# Patient Record
Sex: MALE | Race: WHITE | NOT HISPANIC OR LATINO | Employment: OTHER | URBAN - METROPOLITAN AREA
[De-identification: names, ages, dates, MRNs, and addresses within clinical notes are randomized per-mention and may not be internally consistent; named-entity substitution may affect disease eponyms.]

---

## 2017-01-23 RX ORDER — TAMSULOSIN HYDROCHLORIDE 0.4 MG/1
CAPSULE ORAL
Qty: 60 CAPSULE | Refills: 3 | Status: SHIPPED | OUTPATIENT
Start: 2017-01-23 | End: 2017-05-20 | Stop reason: SDUPTHER

## 2017-01-23 RX ORDER — FLUTICASONE PROPIONATE 50 MCG
SPRAY, SUSPENSION (ML) NASAL
Qty: 1 BOTTLE | Refills: 5 | Status: SHIPPED | OUTPATIENT
Start: 2017-01-23 | End: 2017-07-20 | Stop reason: SDUPTHER

## 2017-03-21 ENCOUNTER — OFFICE VISIT (OUTPATIENT)
Dept: INTERNAL MEDICINE | Facility: CLINIC | Age: 62
End: 2017-03-21

## 2017-03-21 VITALS
RESPIRATION RATE: 16 BRPM | DIASTOLIC BLOOD PRESSURE: 64 MMHG | SYSTOLIC BLOOD PRESSURE: 110 MMHG | WEIGHT: 207 LBS | BODY MASS INDEX: 28.87 KG/M2 | TEMPERATURE: 97.9 F | HEART RATE: 66 BPM

## 2017-03-21 DIAGNOSIS — L03.031 INFECTION OF NAIL BED OF TOE OF RIGHT FOOT: Primary | ICD-10-CM

## 2017-03-21 DIAGNOSIS — Z11.59 NEED FOR HEPATITIS C SCREENING TEST: ICD-10-CM

## 2017-03-21 LAB — HCV AB SER DONR QL: NORMAL

## 2017-03-21 PROCEDURE — 36415 COLL VENOUS BLD VENIPUNCTURE: CPT | Performed by: NURSE PRACTITIONER

## 2017-03-21 PROCEDURE — 86803 HEPATITIS C AB TEST: CPT | Performed by: NURSE PRACTITIONER

## 2017-03-21 PROCEDURE — 99213 OFFICE O/P EST LOW 20 MIN: CPT | Performed by: NURSE PRACTITIONER

## 2017-03-21 RX ORDER — ASPIRIN 81 MG
81 TABLET, DELAYED RELEASE (ENTERIC COATED) ORAL DAILY
Refills: 0 | COMMUNITY
Start: 2017-02-22 | End: 2017-03-21 | Stop reason: SDUPTHER

## 2017-03-21 RX ORDER — KETOCONAZOLE 20 MG/ML
SHAMPOO TOPICAL AS NEEDED
Refills: 0 | COMMUNITY
Start: 2017-02-22 | End: 2022-01-13

## 2017-03-21 RX ORDER — CEPHALEXIN 500 MG/1
500 CAPSULE ORAL 4 TIMES DAILY
Qty: 40 CAPSULE | Refills: 0 | Status: SHIPPED | OUTPATIENT
Start: 2017-03-21 | End: 2017-04-05

## 2017-03-21 NOTE — PROGRESS NOTES
"Chief Complaint   Patient presents with   • Ingrown Toenail     left great toe        Subjective     History of Present Illness the patient comes to clinic today with his wife reporting he has had a recent left great toenail infection this resolved he did cut a shape of a \"V\" in his mouth which he thought was helpful however nail infection has recurred the area is red warm and tender to touch .  The following portions of the patient's history were reviewed and updated as appropriate: allergies, current medications, past family history, past medical history, past social history, past surgical history and problem list.    Review of Systems   Skin:        L lateral great nail infection   All other systems reviewed and are negative.      Objective   Physical Exam   Constitutional: He is oriented to person, place, and time. He appears well-developed and well-nourished.   Cardiovascular: Normal rate and regular rhythm.    Pulmonary/Chest: Effort normal and breath sounds normal.   Neurological: He is alert and oriented to person, place, and time.   Skin: Skin is warm and dry.   Left lateral great nail.  Palpate mild erythema and edema minimal crusted discharge   Psychiatric: He has a normal mood and affect. His behavior is normal.   Nursing note and vitals reviewed.      Results for orders placed or performed in visit on 03/21/17   Hepatitis C Antibody   Result Value Ref Range    Hepatitis C Ab Non-Reactive Non-Reactive        Assessment/Plan   Jason was seen today for ingrown toenail.    Diagnoses and all orders for this visit:    Infection of nail bed of toe of right foot  -     cephalexin (KEFLEX) 500 MG capsule; Take 1 capsule by mouth 4 (Four) Times a Day.  -     Cancel: Tdap Vaccine Greater Than or Equal To 6yo IM  -     Ambulatory Referral to Podiatry    Need for hepatitis C screening test  -     Hepatitis C Antibody      H/M   Hep c today   tdap today    Follow up prn  RTC/call  If symptoms worsen  Meds MOA and " SE's reviewed and pt v/u

## 2017-03-21 NOTE — PATIENT INSTRUCTIONS
Paronychia  Paronychia is an infection of the skin that surrounds a nail. It usually affects the skin around a fingernail, but it may also occur near a toenail. It often causes pain and swelling around the nail. This condition may come on suddenly or develop over a longer period. In some cases, a collection of pus (abscess) can form near or under the nail. Usually, paronychia is not serious and it clears up with treatment.  CAUSES  This condition may be caused by bacteria or fungi. It is commonly caused by either Streptococcus or Staphylococcus bacteria. The bacteria or fungi often cause the infection by getting into the affected area through an opening in the skin, such as a cut or a hangnail.  RISK FACTORS  This condition is more likely to develop in:  · People who get their hands wet often, such as those who work as dishwashers, , or nurses.  · People who bite their fingernails or suck their thumbs.  · People who trim their nails too short.  · People who have hangnails or injured fingertips.  · People who get manicures.  · People who have diabetes.  SYMPTOMS  Symptoms of this condition include:  · Redness and swelling of the skin near the nail.  · Tenderness around the nail when you touch the area.  · Pus-filled bumps under the cuticle. The cuticle is the skin at the base or sides of the nail.  · Fluid or pus under the nail.  · Throbbing pain in the area.  DIAGNOSIS  This condition is usually diagnosed with a physical exam. In some cases, a sample of pus may be taken from an abscess to be tested in a lab. This can help to determine what type of bacteria or fungi is causing the condition.  TREATMENT  Treatment for this condition depends on the cause and severity of the condition. If the condition is mild, it may clear up on its own in a few days. Your health care provider may recommend soaking the affected area in warm water a few times a day. When treatment is needed, the options may  include:  · Antibiotic medicine, if the condition is caused by a bacterial infection.  · Antifungal medicine, if the condition is caused by a fungal infection.  · Incision and drainage, if an abscess is present. In this procedure, the health care provider will cut open the abscess so the pus can drain out.  HOME CARE INSTRUCTIONS  · Soak the affected area in warm water if directed to do so by your health care provider. You may be told to do this for 20 minutes, 2-3 times a day. Keep the area dry in between soakings.  · Take medicines only as directed by your health care provider.  · If you were prescribed an antibiotic medicine, finish all of it even if you start to feel better.  · Keep the affected area clean.  · Do not try to drain a fluid-filled bump yourself.  · If you will be washing dishes or performing other tasks that require your hands to get wet, wear rubber gloves. You should also wear gloves if your hands might come in contact with irritating substances, such as  or chemicals.  · Follow your health care provider's instructions about:    Wound care.    Bandage (dressing) changes and removal.  SEEK MEDICAL CARE IF:  · Your symptoms get worse or do not improve with treatment.  · You have a fever or chills.  · You have redness spreading from the affected area.  · You have continued or increased fluid, blood, or pus coming from the affected area.  · Your finger or knuckle becomes swollen or is difficult to move.     This information is not intended to replace advice given to you by your health care provider. Make sure you discuss any questions you have with your health care provider.     Document Released: 06/13/2002 Document Revised: 05/03/2016 Document Reviewed: 11/25/2015  [employer] Interactive Patient Education ©2016 [employer]  MyPlate from [employer]  The general, healthful diet is based on the 2010 Dietary Guidelines for Americans. The amount of food you need to eat from each food group depends on  your age, sex, and level of physical activity and can be individualized by a dietitian. Go to ChooseMyPlate.gov for more information.  WHAT DO I NEED TO KNOW ABOUT THE MYPLATE PLAN?  · Enjoy your food, but eat less.    · Avoid oversized portions.      ½ of your plate should include fruits and vegetables.    ¼ of your plate should be grains.    ¼ of your plate should be protein.  Grains  · Make at least half of your grains whole grains.  · For a 2,000 calorie daily food plan, eat 6 oz every day.  · 1 oz is about 1 slice bread, 1 cup cereal, or ½ cup cooked rice, cereal, or pasta.  Vegetables  · Make half your plate fruits and vegetables.  · For a 2,000 calorie daily food plan, eat 2½ cups every day.  · 1 cup is about 1 cup raw or cooked vegetables or vegetable juice or 2 cups raw leafy greens.  Fruits  · Make half your plate fruits and vegetables.  · For a 2,000 calorie daily food plan, eat 2 cups every day.  · 1 cup is about 1 cup fruit or 100% fruit juice or ½ cup dried fruit.  Protein  · For a 2,000 calorie daily food plan, eat 5½ oz every day.  · 1 oz is about 1 oz meat, poultry, or fish, ¼ cup cooked beans, 1 egg, 1 Tbsp peanut butter, or ½ oz nuts or seeds.  Dairy  · Switch to fat-free or low-fat (1%) milk.  · For a 2,000 calorie daily food plan, eat 3 cups every day.  · 1 cup is about 1 cup milk or yogurt or soy milk (soy beverage), 1½ oz natural cheese, or 2 oz processed cheese.  Fats, Oils, and Empty Calories  · Only small amounts of oils are recommended.  · Empty calories are calories from solid fats or added sugars.  · Compare sodium in foods like soup, bread, and frozen meals. Choose the foods with lower numbers.  · Drink water instead of sugary drinks.  WHAT FOODS CAN I EAT?  Grains  Whole grains such as whole wheat, quinoa, millet, and bulgur. Bread, rolls, and pasta made from whole grains. Brown or wild rice. Hot or cold cereals made from whole grains and without added sugar.  Vegetables  All fresh  vegetables, especially fresh red, dark green, or orange vegetables. Peas and beans. Low-sodium frozen or canned vegetables prepared without added salt. Low-sodium vegetable juices.  Fruits  All fresh, frozen, and dried fruits. Canned fruit packed in water or fruit juice without added sugar. Fruit juices without added sugar.  Meats and Other Protein Sources  Boiled, baked, or grilled lean meat trimmed of fat. Skinless poultry. Fresh seafood and shellfish. Canned seafood packed in water. Unsalted nuts and unsalted nut butters. Tofu. Dried beans and pea. Eggs.  Dairy  Low-fat or fat-free milk, yogurt, and cheeses.   Sweets and Desserts  Frozen desserts made from low-fat milk.  Fats and Oils  Olive, peanut, and canola oils and margarine. Salad dressing and mayonnaise made from these oils.  Other  Soups and casseroles made from allowed ingredients and without added fat or salt.  The items listed above may not be a complete list of recommended foods or beverages. Contact your dietitian for more options.   WHAT FOODS ARE NOT RECOMMENDED?  Grains  Sweetened, low-fiber cereals. Packaged baked goods. Snack crackers and chips. Cheese crackers, butter crackers, and biscuits. Frozen waffles, sweet breads, doughnuts, pastries, packaged baking mixes, pancakes, cakes, and cookies.  Vegetables  Regular canned or frozen vegetables or vegetables prepared with salt. Canned tomatoes. Canned tomato sauce. Fried vegetables. Vegetables in cream sauce or cheese sauce.  Fruits  Fruits packed in syrup or made with added sugar.   Meats and Other Protein Sources  Marbled or fatty meats such as ribs. Poultry with skin. Fried meats, poultry, eggs, or fish. Sausages, hot dogs, and deli meats such as pastrami, bologna, or salami.  Dairy  Whole milk, cream, cheeses made from whole milk, sour cream. Ice cream or yogurt made from whole milk or with added sugar.  Beverages  For adults, no more than one alcoholic drink per day. Regular soft drinks or  other sugary beverages. Juice drinks.  Sweets and Desserts  Sugary or fatty desserts, candy, and other sweets.  Fats and Oils  Solid shortening or partially hydrogenated oils. Solid margarine. Margarine that contains trans fats. Butter.  The items listed above may not be a complete list of foods and beverages to avoid. Contact your dietitian for more information.     This information is not intended to replace advice given to you by your health care provider. Make sure you discuss any questions you have with your health care provider.     Document Released: 01/06/2009 Document Revised: 01/08/2016 Document Reviewed: 11/26/2014  [employer] Interactive Patient Education ©2016 [employer]

## 2017-04-05 ENCOUNTER — OFFICE VISIT (OUTPATIENT)
Dept: INTERNAL MEDICINE | Facility: CLINIC | Age: 62
End: 2017-04-05

## 2017-04-05 VITALS
RESPIRATION RATE: 16 BRPM | DIASTOLIC BLOOD PRESSURE: 64 MMHG | SYSTOLIC BLOOD PRESSURE: 106 MMHG | BODY MASS INDEX: 28.73 KG/M2 | WEIGHT: 206 LBS | HEART RATE: 62 BPM

## 2017-04-05 DIAGNOSIS — M54.50 CHRONIC LOW BACK PAIN WITHOUT SCIATICA, UNSPECIFIED BACK PAIN LATERALITY: ICD-10-CM

## 2017-04-05 DIAGNOSIS — I50.22 CHRONIC SYSTOLIC CONGESTIVE HEART FAILURE (HCC): ICD-10-CM

## 2017-04-05 DIAGNOSIS — N40.0 BENIGN PROSTATIC HYPERPLASIA WITHOUT LOWER URINARY TRACT SYMPTOMS, UNSPECIFIED MORPHOLOGY: ICD-10-CM

## 2017-04-05 DIAGNOSIS — J30.89 SEASONAL ALLERGIC RHINITIS DUE TO OTHER ALLERGIC TRIGGER: ICD-10-CM

## 2017-04-05 DIAGNOSIS — G89.29 CHRONIC LOW BACK PAIN WITHOUT SCIATICA, UNSPECIFIED BACK PAIN LATERALITY: ICD-10-CM

## 2017-04-05 DIAGNOSIS — I48.19 PERSISTENT ATRIAL FIBRILLATION (HCC): Primary | ICD-10-CM

## 2017-04-05 DIAGNOSIS — I10 ESSENTIAL HYPERTENSION: ICD-10-CM

## 2017-04-05 PROCEDURE — 99214 OFFICE O/P EST MOD 30 MIN: CPT | Performed by: INTERNAL MEDICINE

## 2017-04-05 NOTE — PROGRESS NOTES
Subjective   Jason Lane is a 61 y.o. male.   Pt is here to follow up on A.fib,HTN,CHF,seasonal allergies and BPH.           History of Present Illness   Pt is here to follow up on A.fib,HTN,CHF,seasonal allergies and BPH.   He is scheduled to see Obey Muro, on 4/12/2017.  He states that all chronic issues are doing well.   He does have chronic back pain, as of late he states it has been bothering him more, especially when he is trying to work outside.   He would like to know if there is anything he can do for this.                 The following portions of the patient's history were reviewed and updated as appropriate: allergies, current medications, past family history, past medical history, past social history, past surgical history and problem list.             Review of Systems   Constitutional: Negative.    HENT: Negative.    Eyes: Negative.    Respiratory: Negative.    Cardiovascular:        See HPI.    Gastrointestinal: Negative.    Endocrine: Negative.    Genitourinary:        See HPI.    Musculoskeletal: Negative.    Skin: Negative.    Allergic/Immunologic:        See HPI.    Neurological: Negative.    Hematological: Negative.    Psychiatric/Behavioral: Negative.                 Objective   Physical Exam   Constitutional: He is oriented to person, place, and time. He appears well-developed and well-nourished.   HENT:   Head: Normocephalic and atraumatic.   Right Ear: External ear normal.   Left Ear: External ear normal.   Nose: Nose normal.   Mouth/Throat: Oropharynx is clear and moist.   Eyes: Conjunctivae and EOM are normal. Pupils are equal, round, and reactive to light.   Neck: Normal range of motion. Neck supple. No tracheal deviation present. No thyromegaly present.   Cardiovascular: Normal rate, regular rhythm, normal heart sounds and intact distal pulses.    Pulmonary/Chest: Effort normal and breath sounds normal.   Abdominal: Soft. Bowel sounds are normal. He exhibits no  distension. There is no tenderness.   Neurological: He is alert and oriented to person, place, and time. He has normal reflexes.   Skin: Skin is warm and dry.   Psychiatric: He has a normal mood and affect.   Nursing note and vitals reviewed.              Assessment/Plan    Persistent atrial fibrillation    Essential hypertension  -     Comprehensive Metabolic Panel; Future  -     Lipid Panel; Future  -     CBC (No Diff); Future    Chronic systolic congestive heart failure    Seasonal allergic rhinitis due to other allergic trigger    Benign prostatic hyperplasia without lower urinary tract symptoms, unspecified morphology  -     PSA Screen; Future    Chronic low back pain without sciatica, unspecified back pain laterality  -     Ambulatory Referral to Physical Therapy Evaluate and treat        1.) Tetanus vaccine, Pt will check with pharmacy not covered here.   2.) PSA due in July 2017, Pt informed, I have placed lab order.   3.) Check CBC,CMP and lipid panel with PSA in July 2017.   4.) Physical therapy for low back pain in hopes to strengthen core.

## 2017-04-10 ENCOUNTER — HOSPITAL ENCOUNTER (OUTPATIENT)
Dept: PHYSICAL THERAPY | Facility: HOSPITAL | Age: 62
Setting detail: THERAPIES SERIES
Discharge: HOME | End: 2017-04-10
Attending: INTERNAL MEDICINE

## 2017-04-10 DIAGNOSIS — M54.50 CHRONIC LOW BACK PAIN WITHOUT SCIATICA, UNSPECIFIED BACK PAIN LATERALITY: Primary | ICD-10-CM

## 2017-04-10 DIAGNOSIS — G89.29 CHRONIC LOW BACK PAIN WITHOUT SCIATICA, UNSPECIFIED BACK PAIN LATERALITY: Primary | ICD-10-CM

## 2017-04-10 PROCEDURE — 97161 PT EVAL LOW COMPLEX 20 MIN: CPT

## 2017-04-10 NOTE — PROGRESS NOTES
Outpatient Physical Therapy Ortho Initial Evaluation  [facility]     Patient Name: Jason Lane  : 1955  MRN: [id number]  Today's Date: 4/10/2017      Visit Date: 04/10/2017    Patient Active Problem List   Diagnosis   • Anemia   • Atrial fibrillation   • Benign prostatic hyperplasia   • Constipation   • Reduced libido   • Hypertension   • Shoulder pain   • Seasonal allergic rhinitis   • Screening PSA (prostate specific antigen)   • Eczema   • Alcohol abuse   • Tobacco abuse   • Acute systolic congestive heart failure   • Syncope   • PAF (paroxysmal atrial fibrillation)   • Congestive heart failure   • Skin lesions   • Chronic low back pain without sciatica        Past Medical History:   Diagnosis Date   • Acute systolic congestive heart failure     Acute systolic congestive heart failure secondary to atrial fibrillation with rapid ventricular response.   • Alcohol abuse    • Atrial fibrillation    • Congestive heart failure     History of congestive heart failure with normalized ejection fraction of 50% to 55%, 2014. Continue lisinopril and beta blocker in the form of sotalol.  He has current class I symptoms   • Hearing loss    • Hypertension    • PAF (paroxysmal atrial fibrillation)    • Syncope     Left and right heart catheterization, Dr. Larson, 03/15/2013, with normal pressures and normal coronary arteries: EF 55%.   • Tobacco abuse         No past surgical history on file.    Visit Dx:     ICD-10-CM ICD-9-CM   1. Chronic low back pain without sciatica, unspecified back pain laterality M54.5 724.2    G89.29 338.29             Patient History       04/10/17 0800          History    Chief Complaint Difficulty Walking;Muscle weakness;Pain;Fatigue/poor endurance  -      Type of Pain Back pain  -DR      Date Current Problem(s) Began 01/01/15  -      Brief Description of Current Complaint Mr. Lane presents to clinic with chronic low back pain and denies numbness/tingling in BLE. He  reports he fell a ladder at work 2 years which resulted in x 2 fractures in R pelvis and fracture R wrist. He has had resultant low back pain since then central/R. He reports he is unable to perform activities for extended periods of time such as walking, standing and yard work. He enjoys performing yardwork, but has been limited in holding the weed eater.   -DR      Onset Date- PT 4/10/17  -DR      Patient/Caregiver Goals Relieve pain;Improve mobility;Improve strength;Know what to do to help the symptoms;Other (comment)   be able to increase walking distance/perform yardwork  -DR      Current Tobacco Use no  -DR      Hand Dominance right-handed  -DR      Occupation/sports/leisure activities Retired-yard work, play with grandchildren  -DR      Patient seeing anyone else for problem(s)? Yes  -DR      Pain     Pain Location Back  -DR      Pain at Present 4  -DR      Pain at Best 4  -DR      Pain at Worst 8  -DR      Pain Frequency Constant/continuous  -DR      Pain Description Aching  -DR      What Performance Factors Make the Current Problem(s) WORSE? lifting, yard work, walking, sitting- anything for extended period of time  -DR      What Performance Factors Make the Current Problem(s) BETTER? Advil-helps  -DR      Tolerance Time- Standing 20 minutes  -DR      Tolerance Time- Sitting 20-30 minutes  -DR      Tolerance Time- Walking 30 minutes  -DR      Is your sleep disturbed? Yes  -DR      Total hours of sleep per night 6 hours  -DR      Fall Risk Assessment    Any falls in the past year: No  -DR      Daily Activities    Primary Language English  -DR      Patient is concerned about/has problems with Climbing Stairs;Bed Mobility;Walking  -DR      Pt Participated in POC and Goals Yes  -DR      Safety    Are you being hurt, hit, or frightened by anyone at home or in your life? No  -DR      Are you being neglected by a caregiver No  -DR        User Key  (r) = Recorded By, (t) = Taken By, (c) = Cosigned By    Initials  Name Provider Type    DR Anthony Bergeron, PT Physical Therapist                PT Ortho       04/10/17 0800    Subjective Pain    Able to rate subjective pain? yes  -DR    Pre-Treatment Pain Level 4  -DR    Post-Treatment Pain Level 4  -DR    Posture/Observations    Alignment Options Forward head;Rounded shoulders;Lumbar lordosis;Iliac crests   heel lift LLE-reports podiatrist stated he has LLD  -DR    Forward Head Bilateral:;Mild  -DR    Rounded Shoulders Bilateral:;Moderate  -DR    Lumbar lordosis Bilateral:;Decreased  -DR    Iliac crests --   level  -DR    Posture/Observations Comments Pt demonstrates leg length discrepancy; measured ASIS to inferior medial malleolus: R 37 inches; L 36.5 inches. Pt wears insert in shoe.  -DR    Sensation    Sensation WNL? WNL  -DR    Light Touch No apparent deficits  -DR    Quarter Clearing    Quarter Clearing Lower Quarter Clearing  -DR    DTR- Lower Quarter Clearing    Patellar tendon (L2-4) Bilateral:;2- Normal response  -DR    Achilles tendon (S1-2) Bilateral:;2- Normal response  -DR    Neural Tension Signs- Lower Quarter Clearing    Slump Bilateral:;Negative  -DR    Pathological Reflexes- Lower Quarter Clearing    Babinski Bilateral:;Negative  -DR    Sensory Screen for Light Touch- Lower Quarter Clearing    L2 (anterior mid thigh) Bilateral:;Intact  -DR    L3 (distal anterior thigh) Bilateral:;Intact  -DR    L4 (medial lower leg/foot) Bilateral:;Intact  -DR    L5 (lateral lower leg/great toe) Bilateral:;Intact  -DR    S1 (bottom of foot) Bilateral:;Intact  -DR    Myotomal Screen- Lower Quarter Clearing    Hip flexion (L2) Bilateral:;4+ (Good +)  -DR    Knee extension (L3) Bilateral:;4+ (Good +)  -DR    Ankle DF (L4) Bilateral:;5 (Normal)  -DR    Great toe extension (L5) Bilateral:;4+ (Good +)  -DR    Ankle PF (S1) Bilateral:;4+ (Good +)   ankle eversion  -DR    Knee flexion (S2) Bilateral:;4+ (Good +)  -DR    Lumbar ROM Screen- Lower Quarter Clearing    Lumbar Flexion  Impaired   75% motion-hands at mid shin with pain at end range  -DR    Lumbar Extension Impaired   25% motion; decreased segmental motion  -DR    Lumbar Lateral Flexion Impaired   L/R both 50% motion; reports increased pain on R  -DR    Lumbar Rotation Normal  -DR    SI/Hip Screen- Lower Quarter Clearing    ASIS compression Bilateral:;Negative  -DR    ASIS distraction Bilateral:;Negative  -    Jamie's/Stuart's test Bilateral:   moderately tight  -DR    Special Tests/Palpation    Special Tests/Palpation Lumbar/SI  -DR    Lumbosacral Palpation    SI Right:;Tender  -    Spinous Process Bilateral:;Tender   L5  -DR    Lumbosacral Palpation? Yes  -DR    Lumbosacral Accessory Motions    Lumbosacral Accessory Motions Tested? Yes  -    PA [place]- L1 Center:;Hypomobile  -    PA [place]- L2 Center:;Hypomobile  -DR    PA [place]- L3 Center:;Hypomobile  -DR    PA [place]- L4 Center:;Hypomobile  -DR    PA [place]- L5 WNL;Hypomobile  -DR    PA [place]- Sacral base Center:;Hypomobile  -DR    MMT (Manual Muscle Testing)    General MMT Assessment lower extremity strength deficits identified  -DR    Left Hip    Hip Extension Gross Movement (4/5) good   tested in sidelying  -DR    Hip ABduction Gross Movement (4+/5) good plus  -DR    Hip ADduction Gross Movement (4+/5) good plus  -DR    Right Hip    Hip Extension Gross Movement (4/5) good   tested in sidelying  -DR    Hip ABduction Gross Movement (4/5) good  -DR    Hip ADduction Gross Movement (4/5) good  -DR    Flexibility    Flexibility Tested? Lower Extremity  -DR    Lower Extremity Flexibility    Hamstrings Bilateral:;Moderately limited   L: 45 degrees; R: 50 degrees  -    Pathomechanics    Spine Pathomechanics Limited lumbar flattening with forward bend  -DR    Transfers    Transfer, Comment Independent  -DR    Gait Assessment/Treatment    Gait, Comment Pt presents to clinic with insert L shoe for leg length discrepancy. With shoes on no apparent deficits observed. Without shoes  on pt demonstrated decreased step length bilaterally and R circumduction during swing phase.  -      User Key  (r) = Recorded By, (t) = Taken By, (c) = Cosigned By    Initials Name Provider Type    DR Anthony Bergeron, PT Physical Therapist                                PT OP Goals       04/10/17 0859 04/10/17 0800    PT Short Term Goals    STG Date to Achieve  04/24/17  -    STG 1  Patient is independent with HEP for flexibility and initial strengthening  -    STG 1 Progress  New  -    STG 2  Pt will report low back pain is reduced to < 2/10 at all times.  -    STG 2 Progress  New  -    Long Term Goals    LTG Date to Achieve  05/08/17  -    LTG 1  Patient is independent with long term HEP for stabilization.  -    LTG 1 Progress  New  -    LTG 2  Modified Oswestry score is reduced by at least 10%.  -    LTG 2 Progress  New  -    LTG 3  Pt will report low back pain is reduced to < 1/10 at all times.  -    LTG 3 Progress  New  -    LTG 4  Pt will demonstrate BLE strength gain >/= 1/2 grade to improve ability to perform ADLs.  -    LTG 4 Progress  New  -    Time Calculation    PT Goal Re-Cert Due Date 05/08/17  -DR 05/08/17  -      User Key  (r) = Recorded By, (t) = Taken By, (c) = Cosigned By    Initials Name Provider Type    DR Anthony Bergeron, PT Physical Therapist                PT Assessment/Plan       04/10/17 0800       PT Assessment    Functional Limitations Impaired gait;Limitation in home management;Performance in leisure activities;Performance in self-care ADL;Limitations in community activities  -DR     Impairments Endurance;Gait;Impaired flexibility;Impaired muscle endurance;Muscle strength;Pain;Poor body mechanics;Posture;Range of motion  -DR     Assessment Comments Pt presents to clinic with complaints of chronic low back pain. He demonstrated decreased lumbar AROM, decreased bilateral hamstring flexibility, decreased BLE strength R > L and tenderness to palpation R SI and  L5. He responded well to single knee to chest bilateral LE. He would benefit from skilled PT intervention to address functional deficits and decrease pain.  -DR     Please refer to paper survey for additional self-reported information Yes  -DR     Rehab Potential Good  -DR     Patient/caregiver participated in establishment of treatment plan and goals Yes  -DR     Patient would benefit from skilled therapy intervention Yes  -DR     PT Plan    PT Frequency 2x/week  -DR     Predicted Duration of Therapy Intervention (days/wks) x 4 weeks  -DR     Planned CPT's? PT EVAL LOW COMPLEXITY: 46961;PT THER PROC EA 15 MIN: 45574;PT THER ACT EA 15 MIN: 22538;PT MANUAL THERAPY EA 15 MIN: 06666;PT NEUROMUSC RE-EDUCATION EA 15 MIN: 24778;PT GAIT TRAINING EA 15 MIN: 66159;PT ELECTRICAL STIM ATTD EA 15 MIN: 66790;PT ULTRASOUND EA 15 MIN: 07243;PT HOT/COLD PACK WC NONMCARE: 53341  -DR     PT Plan Comments Pt will be seen x2/wk for 4 weeks with treatment to include stretching, strengthening, neuromuscular re-education, manual therapy and modalities as needed.  -       User Key  (r) = Recorded By, (t) = Taken By, (c) = Cosigned By    Initials Name Provider Type    DR Anthony Bergeron, PT Physical Therapist                  Exercises       04/10/17 0800          Subjective Pain    Able to rate subjective pain? yes  -DR      Pre-Treatment Pain Level 4  -DR      Post-Treatment Pain Level 4  -DR      Exercise 1    Exercise Name 1 Transversus Abdominis Isometric  -DR      Reps 1 10  -DR      Exercise 2    Exercise Name 2 LTR-added to HEP  -DR      Reps 2 10  -DR      Exercise 3    Exercise Name 3 SKTC-added to HEP  -DR      Reps 3 10  -DR        User Key  (r) = Recorded By, (t) = Taken By, (c) = Cosigned By    Initials Name Provider Type    DR Anthony Bergeron, PT Physical Therapist                              Outcome Measures       04/10/17 0800          Modified Oswestry    Modified Oswestry Score/Comments 36%  -      Functional  Assessment    Outcome Measure Options Thomas BRIONES        User Key  (r) = Recorded By, (t) = Taken By, (c) = Cosigned By    Initials Name Provider Type    DR Anthony Bergeron, PT Physical Therapist            Time Calculation:   Start Time: 0800     Therapy Charges for Today     Code Description Service Date Service Provider Modifiers Qty    [id number]  PT EVAL LOW COMPLEXITY 4 4/10/2017 Anthony Bergeron, PT GP 1          PT G-Codes  Outcome Measure Options: Thomas Bergeron, PT  4/10/2017

## 2017-04-10 NOTE — PROGRESS NOTES
Outpatient Physical Therapy Ortho Initial Evaluation  [facility]     Patient Name: Jason Lane  : 1955  MRN: [id number]  Today's Date: 4/10/2017      Visit Date: 04/10/2017    Patient Active Problem List   Diagnosis   • Anemia   • Atrial fibrillation   • Benign prostatic hyperplasia   • Constipation   • Reduced libido   • Hypertension   • Shoulder pain   • Seasonal allergic rhinitis   • Screening PSA (prostate specific antigen)   • Eczema   • Alcohol abuse   • Tobacco abuse   • Acute systolic congestive heart failure   • Syncope   • PAF (paroxysmal atrial fibrillation)   • Congestive heart failure   • Skin lesions   • Chronic low back pain without sciatica        Past Medical History:   Diagnosis Date   • Acute systolic congestive heart failure     Acute systolic congestive heart failure secondary to atrial fibrillation with rapid ventricular response.   • Alcohol abuse    • Atrial fibrillation    • Congestive heart failure     History of congestive heart failure with normalized ejection fraction of 50% to 55%, 2014. Continue lisinopril and beta blocker in the form of sotalol.  He has current class I symptoms   • Hearing loss    • Hypertension    • PAF (paroxysmal atrial fibrillation)    • Syncope     Left and right heart catheterization, Dr. Larson, 03/15/2013, with normal pressures and normal coronary arteries: EF 55%.   • Tobacco abuse         No past surgical history on file.    Visit Dx:     ICD-10-CM ICD-9-CM   1. Chronic low back pain without sciatica, unspecified back pain laterality M54.5 724.2    G89.29 338.29             Patient History       04/10/17 0800          History    Chief Complaint Difficulty Walking;Muscle weakness;Pain;Fatigue/poor endurance  -      Type of Pain Back pain  -DR      Date Current Problem(s) Began 01/01/15  -      Brief Description of Current Complaint Mr. Lane presents to clinic with chronic low back pain and denies numbness/tingling in BLE. He  reports he fell a ladder at work 2 years which resulted in x 2 fractures in R pelvis and fracture R wrist. He has had resultant low back pain since then central/R. He reports he is unable to perform activities for extended periods of time such as walking, standing and yard work. He enjoys performing yardwork, but has been limited in holding the weed eater.   -DR      Onset Date- PT 4/10/17  -DR      Patient/Caregiver Goals Relieve pain;Improve mobility;Improve strength;Know what to do to help the symptoms;Other (comment)   be able to increase walking distance/perform yardwork  -DR      Current Tobacco Use no  -DR      Hand Dominance right-handed  -DR      Occupation/sports/leisure activities Retired-yard work, play with grandchildren  -DR      Patient seeing anyone else for problem(s)? Yes  -DR      Pain     Pain Location Back  -DR      Pain at Present 4  -DR      Pain at Best 4  -DR      Pain at Worst 8  -DR      Pain Frequency Constant/continuous  -DR      Pain Description Aching  -DR      What Performance Factors Make the Current Problem(s) WORSE? lifting, yard work, walking, sitting- anything for extended period of time  -DR      What Performance Factors Make the Current Problem(s) BETTER? Advil-helps  -DR      Tolerance Time- Standing 20 minutes  -DR      Tolerance Time- Sitting 20-30 minutes  -DR      Tolerance Time- Walking 30 minutes  -DR      Is your sleep disturbed? Yes  -DR      Total hours of sleep per night 6 hours  -DR      Fall Risk Assessment    Any falls in the past year: No  -DR      Daily Activities    Primary Language English  -DR      Patient is concerned about/has problems with Climbing Stairs;Bed Mobility;Walking  -DR      Pt Participated in POC and Goals Yes  -DR      Safety    Are you being hurt, hit, or frightened by anyone at home or in your life? No  -DR      Are you being neglected by a caregiver No  -DR        User Key  (r) = Recorded By, (t) = Taken By, (c) = Cosigned By    Initials  Name Provider Type    DR Anthony Bergeron, PT Physical Therapist                PT Ortho       04/10/17 0800    Subjective Pain    Able to rate subjective pain? yes  -DR    Pre-Treatment Pain Level 4  -DR    Post-Treatment Pain Level 4  -DR    Posture/Observations    Alignment Options Forward head;Rounded shoulders;Lumbar lordosis;Iliac crests   heel lift LLE-reports podiatrist stated he has LLD  -DR    Forward Head Bilateral:;Mild  -DR    Rounded Shoulders Bilateral:;Moderate  -DR    Lumbar lordosis Bilateral:;Decreased  -DR    Iliac crests --   level  -DR    Posture/Observations Comments Pt demonstrates leg length discrepancy; measured ASIS to inferior medial malleolus: R 37 inches; L 36.5 inches. Pt wears insert in shoe.  -DR    Sensation    Sensation WNL? WNL  -DR    Light Touch No apparent deficits  -DR    Quarter Clearing    Quarter Clearing Lower Quarter Clearing  -DR    DTR- Lower Quarter Clearing    Patellar tendon (L2-4) Bilateral:;2- Normal response  -DR    Achilles tendon (S1-2) Bilateral:;2- Normal response  -DR    Neural Tension Signs- Lower Quarter Clearing    Slump Bilateral:;Negative  -DR    Pathological Reflexes- Lower Quarter Clearing    Babinski Bilateral:;Negative  -DR    Sensory Screen for Light Touch- Lower Quarter Clearing    L2 (anterior mid thigh) Bilateral:;Intact  -DR    L3 (distal anterior thigh) Bilateral:;Intact  -DR    L4 (medial lower leg/foot) Bilateral:;Intact  -DR    L5 (lateral lower leg/great toe) Bilateral:;Intact  -DR    S1 (bottom of foot) Bilateral:;Intact  -DR    Myotomal Screen- Lower Quarter Clearing    Hip flexion (L2) Bilateral:;4+ (Good +)  -DR    Knee extension (L3) Bilateral:;4+ (Good +)  -DR    Ankle DF (L4) Bilateral:;5 (Normal)  -DR    Great toe extension (L5) Bilateral:;4+ (Good +)  -DR    Ankle PF (S1) Bilateral:;4+ (Good +)   ankle eversion  -DR    Knee flexion (S2) Bilateral:;4+ (Good +)  -DR    Lumbar ROM Screen- Lower Quarter Clearing    Lumbar Flexion  Impaired   75% motion-hands at mid shin with pain at end range  -DR    Lumbar Extension Impaired   25% motion; decreased segmental motion  -DR    Lumbar Lateral Flexion Impaired   L/R both 50% motion; reports increased pain on R  -DR    Lumbar Rotation Normal  -DR    SI/Hip Screen- Lower Quarter Clearing    ASIS compression Bilateral:;Negative  -DR    ASIS distraction Bilateral:;Negative  -    Jamie's/Stuart's test Bilateral:   moderately tight  -DR    Special Tests/Palpation    Special Tests/Palpation Lumbar/SI  -DR    Lumbosacral Palpation    SI Right:;Tender  -    Spinous Process Bilateral:;Tender   L5  -DR    Lumbosacral Palpation? Yes  -DR    Lumbosacral Accessory Motions    Lumbosacral Accessory Motions Tested? Yes  -    PA [place]- L1 Center:;Hypomobile  -    PA [place]- L2 Center:;Hypomobile  -DR    PA [place]- L3 Center:;Hypomobile  -DR    PA [place]- L4 Center:;Hypomobile  -DR    PA [place]- L5 WNL;Hypomobile  -DR    PA glide- Sacral base Center:;Hypomobile  -DR    MMT (Manual Muscle Testing)    General MMT Assessment lower extremity strength deficits identified  -DR    Left Hip    Hip Extension Gross Movement (4/5) good   tested in sidelying  -DR    Hip ABduction Gross Movement (4+/5) good plus  -DR    Hip ADduction Gross Movement (4+/5) good plus  -DR    Right Hip    Hip Extension Gross Movement (4/5) good   tested in sidelying  -DR    Hip ABduction Gross Movement (4/5) good  -DR    Hip ADduction Gross Movement (4/5) good  -DR    Flexibility    Flexibility Tested? Lower Extremity  -DR    Lower Extremity Flexibility    Hamstrings Bilateral:;Moderately limited   L: 45 degrees; R: 50 degrees  -    Pathomechanics    Spine Pathomechanics Limited lumbar flattening with forward bend  -DR    Transfers    Transfer, Comment Independent  -DR    Gait Assessment/Treatment    Gait, Comment Pt presents to clinic with insert L shoe for leg length discrepancy. With shoes on no apparent deficits observed. Without shoes  on pt demonstrated decreased step length bilaterally and R circumduction during swing phase.  -      User Key  (r) = Recorded By, (t) = Taken By, (c) = Cosigned By    Initials Name Provider Type    DR Anthony Bergeron, PT Physical Therapist                            Therapy Education       04/10/17 0936          Therapy Education    Given HEP  -DR      Program New  ANSELMO      How Provided Verbal;Demonstration;Written  -      Provided to Patient  -DR      Level of Understanding Verbalized;Demonstrated  -        User Key  (r) = Recorded By, (t) = Taken By, (c) = Cosigned By    Initials Name Provider Type    DR Anthony Bergeron, PT Physical Therapist                PT OP Goals       04/10/17 0859 04/10/17 0800    PT Short Term Goals    STG Date to Achieve  04/24/17  -    STG 1  Patient is independent with HEP for flexibility and initial strengthening  -    STG 1 Progress  New  -    STG 2  Pt will report low back pain is reduced to < 2/10 at all times.  -    STG 2 Progress  New  ANSELMO    Long Term Goals    LTG Date to Achieve  05/08/17  -    LTG 1  Patient is independent with long term HEP for stabilization.  -    LTG 1 Progress  New  -    LTG 2  Modified Oswestry score is reduced by at least 10%.  -    LTG 2 Progress  New  -    LTG 3  Pt will report low back pain is reduced to < 1/10 at all times.  -    LTG 3 Progress  New  -    LTG 4  Pt will demonstrate BLE strength gain >/= 1/2 grade to improve ability to perform ADLs.  -DR HOOKSG 4 Progress  New  -DR    Time Calculation    PT Goal Re-Cert Due Date 05/08/17  -DR 05/08/17  -      User Key  (r) = Recorded By, (t) = Taken By, (c) = Cosigned By    Initials Name Provider Type    DR Anthony Bergeron, PT Physical Therapist                PT Assessment/Plan       04/10/17 0800       PT Assessment    Functional Limitations Impaired gait;Limitation in home management;Performance in leisure activities;Performance in self-care ADL;Limitations in community  activities  -DR     Impairments Endurance;Gait;Impaired flexibility;Impaired muscle endurance;Muscle strength;Pain;Poor body mechanics;Posture;Range of motion  -DR     Assessment Comments Pt presents to clinic with complaints of chronic low back pain. He demonstrated decreased lumbar AROM, decreased bilateral hamstring flexibility, decreased BLE strength R > L and tenderness to palpation R SI and L5. He responded well to single knee to chest bilateral LE. He would benefit from skilled PT intervention to address functional deficits and decrease pain.  -DR     Please refer to paper survey for additional self-reported information Yes  -DR     Rehab Potential Good  -DR     Patient/caregiver participated in establishment of treatment plan and goals Yes  -DR     Patient would benefit from skilled therapy intervention Yes  -DR     PT Plan    PT Frequency 2x/week  -DR     Predicted Duration of Therapy Intervention (days/wks) x 4 weeks  -DR     Planned CPT's? PT EVAL LOW COMPLEXITY: 25787;PT THER PROC EA 15 MIN: 38111;PT THER ACT EA 15 MIN: 24243;PT MANUAL THERAPY EA 15 MIN: 12130;PT NEUROMUSC RE-EDUCATION EA 15 MIN: 40613;PT GAIT TRAINING EA 15 MIN: 10473;PT ELECTRICAL STIM ATTD EA 15 MIN: 52020;PT ULTRASOUND EA 15 MIN: 80942;PT HOT/COLD PACK WC NONMCARE: 10332  -DR     PT Plan Comments Pt will be seen x2/wk for 4 weeks with treatment to include stretching, strengthening, neuromuscular re-education, manual therapy and modalities as needed.  -       User Key  (r) = Recorded By, (t) = Taken By, (c) = Cosigned By    Initials Name Provider Type    DR Anthony Bergeron, PT Physical Therapist                  Exercises       04/10/17 0800          Subjective Pain    Able to rate subjective pain? yes  -DR      Pre-Treatment Pain Level 4  -DR      Post-Treatment Pain Level 4  -DR      Exercise 1    Exercise Name 1 Transversus Abdominis Isometric  -DR      Reps 1 10  -DR      Exercise 2    Exercise Name 2 LTR-added to HEP  -       Reps 2 10  -DR      Exercise 3    Exercise Name 3 SKTC-added to HEP  -DR      Reps 3 10  -DR        User Key  (r) = Recorded By, (t) = Taken By, (c) = Cosigned By    Initials Name Provider Type    DR Anthony Bergeron, PT Physical Therapist                              Outcome Measures       04/10/17 0800          Modified Oswestry    Modified Oswestry Score/Comments 36%  -DR      Functional Assessment    Outcome Measure Options Modifed Rosa  -        User Key  (r) = Recorded By, (t) = Taken By, (c) = Cosigned By    Initials Name Provider Type    DR Anthony Bergeron, PT Physical Therapist            Time Calculation:   Start Time: 0800     Therapy Charges for Today     Code Description Service Date Service Provider Modifiers Qty    55929075437 HC PT EVAL LOW COMPLEXITY 4 4/10/2017 Anthony Bergeron, PT GP 1          PT G-Codes  Outcome Measure Options: Thomas Bergeron, PT  4/10/2017

## 2017-04-12 ENCOUNTER — OFFICE VISIT (OUTPATIENT)
Dept: CARDIOLOGY | Facility: CLINIC | Age: 62
End: 2017-04-12

## 2017-04-12 VITALS
WEIGHT: 203.2 LBS | HEART RATE: 58 BPM | SYSTOLIC BLOOD PRESSURE: 112 MMHG | DIASTOLIC BLOOD PRESSURE: 72 MMHG | HEIGHT: 71 IN | BODY MASS INDEX: 28.45 KG/M2

## 2017-04-12 DIAGNOSIS — I10 ESSENTIAL HYPERTENSION: ICD-10-CM

## 2017-04-12 DIAGNOSIS — I42.9 CARDIOMYOPATHY (HCC): ICD-10-CM

## 2017-04-12 DIAGNOSIS — I48.0 PAROXYSMAL ATRIAL FIBRILLATION (HCC): Primary | ICD-10-CM

## 2017-04-12 PROCEDURE — 99213 OFFICE O/P EST LOW 20 MIN: CPT | Performed by: INTERNAL MEDICINE

## 2017-04-12 PROCEDURE — 93000 ELECTROCARDIOGRAM COMPLETE: CPT | Performed by: INTERNAL MEDICINE

## 2017-04-12 NOTE — PROGRESS NOTES
Jason Lane  1955  [phone]  [phone]    04/12/2017    [facility] CARDIOLOGY     Susanna Mancilla, DO  [address]  [facility] [address]    Chief Complaint   Patient presents with   • Atrial Fibrillation       Problem List:   PROBLEM LIST:   1. Atrial fibrillation:  a. Rapid ventricular response of unknown duration, hospitalized on 01/01/2012.   b. Pradaxa and sotalol initiated, 01/02/2012.   c. Echocardiogram, January 2012 with mild mitral regurgitation, ejection fraction 35% to 40%.   d. Echocardiogram, May 2012: Left ventricular ejection fraction of 45% to 50%, mild TR.   e. Echocardiogram, 12/15/2014: EF 50% to 55%. Mild-to-moderate TR.   2. Alcohol abuse.   3. Tobacco abuse.   4. Hypertension.   5. Acute systolic congestive heart failure secondary to atrial fibrillation with rapid ventricular response.   6. Syncope:  a. Left and right heart catheterization, Dr. Larson, 03/15/2013, with normal pressures and normal coronary arteries: EF 55%  Allergies  No Known Allergies    Current Medications    Current Outpatient Prescriptions:   •  aspirin 81 MG tablet, Take 162 mg by mouth Daily., Disp: , Rfl:   •  dorzolamide (TRUSOPT) 2 % ophthalmic solution, Apply  to eye., Disp: , Rfl:   •  fluticasone (FLONASE) 50 MCG/ACT nasal spray, instill 1 to 2 sprays into each nostril once daily, Disp: 1 bottle, Rfl: 5  •  ketoconazole (NIZORAL) 2 % shampoo, , Disp: , Rfl: 0  •  latanoprost (XALATAN) 0.005 % ophthalmic solution, Apply  to eye., Disp: , Rfl:   •  lisinopril (PRINIVIL,ZESTRIL) 40 MG tablet, Take 1 tablet by mouth 2 (two) times a day., Disp: 60 tablet, Rfl: 6  •  Multiple Vitamins-Minerals (MULTIVITAMIN ADULTS 50+ PO), Take 1 tablet by mouth daily., Disp: , Rfl:   •  sotalol (BETAPACE) 80 MG tablet, Take 1 tablet by mouth 2 (two) times a day., Disp: 60 tablet, Rfl: 6  •  tamsulosin (FLOMAX) 0.4 MG capsule 24 hr capsule, take 2 capsules by mouth once daily,  "Disp: 60 capsule, Rfl: 3  •  timolol (TIMOPTIC) 0.5 % ophthalmic solution, Apply  to eye., Disp: , Rfl:     History of Present Illness   HPI    Pt presents for follow up of  AF/DCM . Since we last saw the pt, pt denies any AF episodes, SOB, CP, LH, and dizziness. Denies any hospitalizations, ER visits, bleeding, or TIA/CVA symptoms. Overall feels well. Bp at home have been stable    ROS:  General:  Denies fatigue, weight gain or loss  Cardiovascular:  Denies CP, PND, syncope, near syncope, edema or palpitations.  Pulmonary:  Denies JONES, cough, or wheezing      Vitals:    04/12/17 1448   BP: 112/72   BP Location: Right arm   Patient Position: Sitting   Pulse: 58   Weight: 203 lb 3.2 oz (92.2 kg)   Height: 71\" (180.3 cm)     PE:  General: NAD  Neck: no JVD, no carotid bruits, no TM  Heart RRR, NL S1, S2, S4 present, no rubs, murmurs  Lungs: CTA, no wheezes, rhonchi, or rales  Abd: soft, non-tender, NL BS  Ext: No musculoskeletal deformities, no edema, cyanosis, or clubbing  Psych: normal mood and affect    Diagnostic Data:    ECG 12 Lead  Date/Time: 4/12/2017 3:03 PM  Performed by: CATHLEEN BONDS  Authorized by: CATHLEEN BONDS   Comparison: compared with previous ECG from 9/27/2016  Similar to previous ECG  Rhythm: sinus rhythm  BPM: 60              1. Paroxysmal atrial fibrillation    2. Essential hypertension    3. Cardiomyopathy          Plan:  1) AF: no recurrence on sotalol  Continue present medications.   2) Anticoagulation  Continue ASA  3) HTN  Wt loss, exercise, salt reduction  4) DCM: resolved    F/up in 6 months      "

## 2017-05-08 ENCOUNTER — DOCUMENTATION (OUTPATIENT)
Dept: PHYSICAL THERAPY | Facility: HOSPITAL | Age: 62
End: 2017-05-08

## 2017-05-08 DIAGNOSIS — G89.29 CHRONIC LOW BACK PAIN WITHOUT SCIATICA, UNSPECIFIED BACK PAIN LATERALITY: Primary | ICD-10-CM

## 2017-05-08 DIAGNOSIS — M54.50 CHRONIC LOW BACK PAIN WITHOUT SCIATICA, UNSPECIFIED BACK PAIN LATERALITY: Primary | ICD-10-CM

## 2017-05-22 RX ORDER — TAMSULOSIN HYDROCHLORIDE 0.4 MG/1
CAPSULE ORAL
Qty: 60 CAPSULE | Refills: 3 | Status: SHIPPED | OUTPATIENT
Start: 2017-05-22 | End: 2017-09-27 | Stop reason: SDUPTHER

## 2017-06-28 NOTE — THERAPY DISCHARGE NOTE
Outpatient Physical Therapy Discharge Summary         Patient Name: Jason Lane  : 1955  MRN: [id number]    Today's Date: 2017    Visit Dx:    ICD-10-CM ICD-9-CM   1. Chronic low back pain without sciatica, unspecified back pain laterality M54.5 724.2    G89.29 338.29             PT OP Goals       17 0800       PT Short Term Goals    STG Date to Achieve 17  -DR     STG 1 Patient is independent with HEP for flexibility and initial strengthening  -DR     STG 1 Progress Not Met  -DR     STG 2 Pt will report low back pain is reduced to < 2/10 at all times.  -DR     STG 2 Progress Not Met  -DR     Long Term Goals    LTG Date to Achieve 17  -DR     LTG 1 Patient is independent with long term HEP for stabilization.  -DR     LTG 1 Progress Not Met  -DR     LTG 2 Modified Oswestry score is reduced by at least 10%.  -DR     LTG 2 Progress Not Met  -DR     LTG 3 Pt will report low back pain is reduced to < 1/10 at all times.  -DR     LTG 3 Progress Not Met  -DR     LTG 4 Pt will demonstrate BLE strength gain >/= 1/2 grade to improve ability to perform ADLs.  -DR     LTG 4 Progress Not Met  -DR       User Key  (r) = Recorded By, (t) = Taken By, (c) = Cosigned By    Initials Name Provider Type    DR Anthony Bergeron, PT Physical Therapist          OP PT Discharge Summary  Date of Discharge: 17  Reason for Discharge: Non-compliant  Outcomes Achieved: Refer to plan of care for updates on goals achieved  Discharge Destination: Home with home program  Discharge Instructions: Pt attended PT initial evaluation 4/10/17 and did not return for follow up visits. He will be discharged from skilled PT at this time.                   Anthony Bergeron, PT  2017

## 2017-07-10 ENCOUNTER — LAB (OUTPATIENT)
Dept: LAB | Facility: HOSPITAL | Age: 62
End: 2017-07-10

## 2017-07-10 DIAGNOSIS — N40.0 BENIGN PROSTATIC HYPERPLASIA WITHOUT LOWER URINARY TRACT SYMPTOMS, UNSPECIFIED MORPHOLOGY: ICD-10-CM

## 2017-07-10 DIAGNOSIS — I10 ESSENTIAL HYPERTENSION: ICD-10-CM

## 2017-07-10 LAB
ALBUMIN SERPL-MCNC: 4.3 G/DL (ref 3.2–4.8)
ALBUMIN/GLOB SERPL: 1.7 G/DL (ref 1.5–2.5)
ALP SERPL-CCNC: 64 U/L (ref 25–100)
ALT SERPL W P-5'-P-CCNC: 15 U/L (ref 7–40)
ANION GAP SERPL CALCULATED.3IONS-SCNC: 11 MMOL/L (ref 3–11)
ARTICHOKE IGE QN: 122 MG/DL (ref 0–130)
AST SERPL-CCNC: 16 U/L (ref 0–33)
BILIRUB SERPL-MCNC: 0.6 MG/DL (ref 0.3–1.2)
BUN BLD-MCNC: 18 MG/DL (ref 9–23)
BUN/CREAT SERPL: 16.4 (ref 7–25)
CALCIUM SPEC-SCNC: 9.7 MG/DL (ref 8.7–10.4)
CHLORIDE SERPL-SCNC: 99 MMOL/L (ref 99–109)
CHOLEST SERPL-MCNC: 191 MG/DL (ref 0–200)
CO2 SERPL-SCNC: 21 MMOL/L (ref 20–31)
CREAT BLD-MCNC: 1.1 MG/DL (ref 0.6–1.3)
DEPRECATED RDW RBC AUTO: 44.2 FL (ref 37–54)
ERYTHROCYTE [DISTWIDTH] IN BLOOD BY AUTOMATED COUNT: 12.8 % (ref 11.3–14.5)
GFR SERPL CREATININE-BSD FRML MDRD: 69 ML/MIN/1.73
GLOBULIN UR ELPH-MCNC: 2.5 GM/DL
GLUCOSE BLD-MCNC: 96 MG/DL (ref 70–100)
HCT VFR BLD AUTO: 36.4 % (ref 38.9–50.9)
HDLC SERPL-MCNC: 45 MG/DL (ref 40–60)
HGB BLD-MCNC: 12.1 G/DL (ref 13.1–17.5)
MCH RBC QN AUTO: 31.3 PG (ref 27–31)
MCHC RBC AUTO-ENTMCNC: 33.2 G/DL (ref 32–36)
MCV RBC AUTO: 94.3 FL (ref 80–99)
PLATELET # BLD AUTO: 278 10*3/MM3 (ref 150–450)
PMV BLD AUTO: 9.9 FL (ref 6–12)
POTASSIUM BLD-SCNC: 5.2 MMOL/L (ref 3.5–5.5)
PROT SERPL-MCNC: 6.8 G/DL (ref 5.7–8.2)
PSA SERPL-MCNC: 0.58 NG/ML (ref 0–4)
RBC # BLD AUTO: 3.86 10*6/MM3 (ref 4.2–5.76)
SODIUM BLD-SCNC: 131 MMOL/L (ref 132–146)
TRIGL SERPL-MCNC: 114 MG/DL (ref 0–150)
WBC NRBC COR # BLD: 7.78 10*3/MM3 (ref 3.5–10.8)

## 2017-07-10 PROCEDURE — 80053 COMPREHEN METABOLIC PANEL: CPT | Performed by: INTERNAL MEDICINE

## 2017-07-10 PROCEDURE — 85027 COMPLETE CBC AUTOMATED: CPT | Performed by: INTERNAL MEDICINE

## 2017-07-10 PROCEDURE — 80061 LIPID PANEL: CPT | Performed by: INTERNAL MEDICINE

## 2017-07-10 PROCEDURE — G0103 PSA SCREENING: HCPCS | Performed by: INTERNAL MEDICINE

## 2017-07-20 RX ORDER — FLUTICASONE PROPIONATE 50 MCG
SPRAY, SUSPENSION (ML) NASAL
Qty: 16 G | Refills: 5 | Status: SHIPPED | OUTPATIENT
Start: 2017-07-20 | End: 2018-03-26

## 2017-07-20 RX ORDER — LISINOPRIL 40 MG/1
TABLET ORAL
Qty: 60 TABLET | Refills: 6 | Status: SHIPPED | OUTPATIENT
Start: 2017-07-20 | End: 2018-02-22 | Stop reason: SDUPTHER

## 2017-09-27 RX ORDER — TAMSULOSIN HYDROCHLORIDE 0.4 MG/1
CAPSULE ORAL
Qty: 60 CAPSULE | Refills: 3 | Status: SHIPPED | OUTPATIENT
Start: 2017-09-27 | End: 2018-02-04 | Stop reason: SDUPTHER

## 2017-09-27 RX ORDER — ACETAMINOPHEN/DIPHENHYDRAMINE 500MG-25MG
TABLET ORAL
Qty: 60 TABLET | Refills: 6 | Status: SHIPPED | OUTPATIENT
Start: 2017-09-27 | End: 2018-04-26 | Stop reason: SDUPTHER

## 2017-10-16 RX ORDER — SOTALOL HYDROCHLORIDE 80 MG/1
TABLET ORAL
Qty: 60 TABLET | Refills: 6 | Status: SHIPPED | OUTPATIENT
Start: 2017-10-16 | End: 2018-08-22

## 2017-12-20 ENCOUNTER — OFFICE VISIT (OUTPATIENT)
Dept: CARDIOLOGY | Facility: CLINIC | Age: 62
End: 2017-12-20

## 2017-12-20 VITALS
BODY MASS INDEX: 27.8 KG/M2 | WEIGHT: 198.6 LBS | HEART RATE: 56 BPM | DIASTOLIC BLOOD PRESSURE: 70 MMHG | SYSTOLIC BLOOD PRESSURE: 118 MMHG | HEIGHT: 71 IN

## 2017-12-20 DIAGNOSIS — I48.19 PERSISTENT ATRIAL FIBRILLATION (HCC): Primary | ICD-10-CM

## 2017-12-20 DIAGNOSIS — I10 ESSENTIAL HYPERTENSION: ICD-10-CM

## 2017-12-20 DIAGNOSIS — I42.9 CARDIOMYOPATHY, UNSPECIFIED TYPE (HCC): ICD-10-CM

## 2017-12-20 PROCEDURE — 93000 ELECTROCARDIOGRAM COMPLETE: CPT | Performed by: INTERNAL MEDICINE

## 2017-12-20 PROCEDURE — 99213 OFFICE O/P EST LOW 20 MIN: CPT | Performed by: INTERNAL MEDICINE

## 2017-12-20 NOTE — PROGRESS NOTES
Jason Lane  1955  [phone]  [phone]    12/20/2017    [facility] CARDIOLOGY     Susanna DO Laurent (Inactive)  No address on file    Chief Complaint   Patient presents with   • Atrial Fibrillation       Problem List:   PROBLEM LIST:   1. Atrial fibrillation:  a. Rapid ventricular response of unknown duration, hospitalized on 01/01/2012.   b. Pradaxa and sotalol initiated, 01/02/2012.   c. Echocardiogram, January 2012 with mild mitral regurgitation, ejection fraction 35% to 40%.   d. Echocardiogram, May 2012: Left ventricular ejection fraction of 45% to 50%, mild TR.   e. Echocardiogram, 12/15/2014: EF 50% to 55%. Mild-to-moderate TR.   2. Alcohol abuse.   3. Tobacco abuse.   4. Hypertension.   5. Acute systolic congestive heart failure secondary to atrial fibrillation with rapid ventricular response.   6. Syncope:  a. Left and right heart catheterization, Dr. Larson, 03/15/2013, with normal pressures and normal coronary arteries: EF 55%  Allergies  No Known Allergies    Current Medications    Current Outpatient Prescriptions:   •  dorzolamide (TRUSOPT) 2 % ophthalmic solution, Administer  to both eyes 3 (Three) Times a Day., Disp: , Rfl:   •  fluticasone (FLONASE) 50 MCG/ACT nasal spray, instill 1 to 2 sprays into each nostril once daily, Disp: 16 g, Rfl: 5  •  ketoconazole (NIZORAL) 2 % shampoo, As Needed., Disp: , Rfl: 0  •  latanoprost (XALATAN) 0.005 % ophthalmic solution, Administer  to both eyes Daily., Disp: , Rfl:   •  lisinopril (PRINIVIL,ZESTRIL) 40 MG tablet, take 1 tablet by mouth twice a day, Disp: 60 tablet, Rfl: 6  •  Multiple Vitamins-Minerals (MULTIVITAMIN ADULTS 50+ PO), Take 1 tablet by mouth daily., Disp: , Rfl:   •  RA ASPIRIN EC 81 MG EC tablet, take 2 tablets by mouth once daily, Disp: 60 tablet, Rfl: 6  •  sotalol (BETAPACE) 80 MG tablet, take 1 tablet by mouth twice a day, Disp: 60 tablet, Rfl: 6  •  tamsulosin (FLOMAX) 0.4 MG capsule 24 hr  "capsule, take 2 capsules by mouth once daily, Disp: 60 capsule, Rfl: 3  •  timolol (TIMOPTIC) 0.5 % ophthalmic solution, Administer  to both eyes Daily., Disp: , Rfl:     History of Present Illness   HPI    Pt presents for follow up of AF/DCM/HTN . Since we last saw the pt, pt denies any AF episodes, SOB, CP, LH, and dizziness. Denies any hospitalizations, ER visits, bleeding, or TIA/CVA symptoms. Overall feels well. Occasionaly check BP at home that are stable.    ROS:  General:  + fatigue, No weight gain or loss  Cardiovascular:  Denies CP, PND, syncope, near syncope, edema or palpitations.  Pulmonary:  + chronic JONES, No cough, or wheezing      Vitals:    12/20/17 1330   BP: 118/70   BP Location: Right arm   Patient Position: Sitting   Pulse: 56   Weight: 90.1 kg (198 lb 9.6 oz)   Height: 180.3 cm (71\")     PE:  General: NAD  Neck: no JVD, no carotid bruits, no TM  Heart RRR, NL S1, S2, S4 present, no rubs, murmurs  Lungs: CTA except for scant  wheezes  Abd: soft, non-tender, NL BS  Ext: No musculoskeletal deformities, no edema, cyanosis, or clubbing  Psych: normal mood and affect    Diagnostic Data:        ECG 12 Lead  Date/Time: 12/20/2017 1:45 PM  Performed by: CATHLEEN BONDS  Authorized by: CATHLEEN BONDS   Comparison: compared with previous ECG from 4/12/2017  Similar to previous ECG  Rhythm: sinus rhythm  BPM: 56              1. Persistent atrial fibrillation    2. Essential hypertension    3. Cardiomyopathy, unspecified type          Plan:  1) AF: no recurrence on sotalol: doing well   Continue present medications.   2) Anticoagulation  Continue ASA  3) HTN on meds doing well  Wt loss, exercise, salt reduction  4) DCM: resolved    F/up in 8 months      "

## 2018-02-05 RX ORDER — TAMSULOSIN HYDROCHLORIDE 0.4 MG/1
CAPSULE ORAL
Qty: 60 CAPSULE | Refills: 3 | Status: SHIPPED | OUTPATIENT
Start: 2018-02-05 | End: 2018-06-20 | Stop reason: SDUPTHER

## 2018-02-23 RX ORDER — LISINOPRIL 40 MG/1
TABLET ORAL
Qty: 60 TABLET | Refills: 6 | Status: SHIPPED | OUTPATIENT
Start: 2018-02-23 | End: 2018-09-19 | Stop reason: SDUPTHER

## 2018-03-26 ENCOUNTER — OFFICE VISIT (OUTPATIENT)
Dept: FAMILY MEDICINE CLINIC | Facility: CLINIC | Age: 63
End: 2018-03-26

## 2018-03-26 ENCOUNTER — APPOINTMENT (OUTPATIENT)
Dept: LAB | Facility: HOSPITAL | Age: 63
End: 2018-03-26

## 2018-03-26 VITALS
SYSTOLIC BLOOD PRESSURE: 120 MMHG | HEIGHT: 71 IN | WEIGHT: 197 LBS | OXYGEN SATURATION: 99 % | DIASTOLIC BLOOD PRESSURE: 70 MMHG | HEART RATE: 60 BPM | TEMPERATURE: 98.1 F | BODY MASS INDEX: 27.58 KG/M2

## 2018-03-26 DIAGNOSIS — R06.09 DYSPNEA ON EXERTION: ICD-10-CM

## 2018-03-26 DIAGNOSIS — J30.0 CHRONIC VASOMOTOR RHINITIS: ICD-10-CM

## 2018-03-26 DIAGNOSIS — R53.83 FATIGUE, UNSPECIFIED TYPE: ICD-10-CM

## 2018-03-26 DIAGNOSIS — D64.9 ANEMIA, UNSPECIFIED TYPE: Primary | ICD-10-CM

## 2018-03-26 LAB
ANION GAP SERPL CALCULATED.3IONS-SCNC: 9 MMOL/L (ref 3–11)
BASOPHILS # BLD AUTO: 0.05 10*3/MM3 (ref 0–0.2)
BASOPHILS NFR BLD AUTO: 0.6 % (ref 0–1)
BUN BLD-MCNC: 17 MG/DL (ref 9–23)
BUN/CREAT SERPL: 17 (ref 7–25)
CALCIUM SPEC-SCNC: 9.2 MG/DL (ref 8.7–10.4)
CHLORIDE SERPL-SCNC: 100 MMOL/L (ref 99–109)
CO2 SERPL-SCNC: 26 MMOL/L (ref 20–31)
CREAT BLD-MCNC: 1 MG/DL (ref 0.6–1.3)
DEPRECATED RDW RBC AUTO: 46.1 FL (ref 37–54)
EOSINOPHIL # BLD AUTO: 0.37 10*3/MM3 (ref 0–0.3)
EOSINOPHIL NFR BLD AUTO: 4.5 % (ref 0–3)
ERYTHROCYTE [DISTWIDTH] IN BLOOD BY AUTOMATED COUNT: 13.1 % (ref 11.3–14.5)
FERRITIN SERPL-MCNC: 547 NG/ML (ref 22–322)
FOLATE SERPL-MCNC: >24 NG/ML (ref 3.2–20)
GFR SERPL CREATININE-BSD FRML MDRD: 76 ML/MIN/1.73
GLUCOSE BLD-MCNC: 86 MG/DL (ref 70–100)
HCT VFR BLD AUTO: 35.9 % (ref 38.9–50.9)
HGB BLD-MCNC: 11.6 G/DL (ref 13.1–17.5)
IMM GRANULOCYTES # BLD: 0.04 10*3/MM3 (ref 0–0.03)
IMM GRANULOCYTES NFR BLD: 0.5 % (ref 0–0.6)
IRON 24H UR-MRATE: 124 MCG/DL (ref 50–175)
LYMPHOCYTES # BLD AUTO: 2.2 10*3/MM3 (ref 0.6–4.8)
LYMPHOCYTES NFR BLD AUTO: 26.7 % (ref 24–44)
MCH RBC QN AUTO: 30.9 PG (ref 27–31)
MCHC RBC AUTO-ENTMCNC: 32.3 G/DL (ref 32–36)
MCV RBC AUTO: 95.7 FL (ref 80–99)
MONOCYTES # BLD AUTO: 0.8 10*3/MM3 (ref 0–1)
MONOCYTES NFR BLD AUTO: 9.7 % (ref 0–12)
NEUTROPHILS # BLD AUTO: 4.79 10*3/MM3 (ref 1.5–8.3)
NEUTROPHILS NFR BLD AUTO: 58 % (ref 41–71)
PLATELET # BLD AUTO: 266 10*3/MM3 (ref 150–450)
PMV BLD AUTO: 9.9 FL (ref 6–12)
POTASSIUM BLD-SCNC: 5.6 MMOL/L (ref 3.5–5.5)
RBC # BLD AUTO: 3.75 10*6/MM3 (ref 4.2–5.76)
SODIUM BLD-SCNC: 135 MMOL/L (ref 132–146)
VIT B12 BLD-MCNC: 784 PG/ML (ref 211–911)
WBC NRBC COR # BLD: 8.25 10*3/MM3 (ref 3.5–10.8)

## 2018-03-26 PROCEDURE — 82607 VITAMIN B-12: CPT | Performed by: PHYSICIAN ASSISTANT

## 2018-03-26 PROCEDURE — 36415 COLL VENOUS BLD VENIPUNCTURE: CPT | Performed by: PHYSICIAN ASSISTANT

## 2018-03-26 PROCEDURE — 85025 COMPLETE CBC W/AUTO DIFF WBC: CPT | Performed by: PHYSICIAN ASSISTANT

## 2018-03-26 PROCEDURE — 83540 ASSAY OF IRON: CPT | Performed by: PHYSICIAN ASSISTANT

## 2018-03-26 PROCEDURE — 82746 ASSAY OF FOLIC ACID SERUM: CPT | Performed by: PHYSICIAN ASSISTANT

## 2018-03-26 PROCEDURE — 82728 ASSAY OF FERRITIN: CPT | Performed by: PHYSICIAN ASSISTANT

## 2018-03-26 PROCEDURE — 80048 BASIC METABOLIC PNL TOTAL CA: CPT | Performed by: PHYSICIAN ASSISTANT

## 2018-03-26 PROCEDURE — 99214 OFFICE O/P EST MOD 30 MIN: CPT | Performed by: PHYSICIAN ASSISTANT

## 2018-03-26 RX ORDER — IPRATROPIUM BROMIDE 21 UG/1
2 SPRAY, METERED NASAL EVERY 12 HOURS
Qty: 1 EACH | Refills: 12 | Status: SHIPPED | OUTPATIENT
Start: 2018-03-26 | End: 2018-10-22

## 2018-03-26 NOTE — PROGRESS NOTES
Subjective   Jason Lane is a 62 y.o. male  Establish Care (New patient establish care, previous PCP Susanna Mancilla ); Allergies (increased allergies, discuss options for nasal sprays ); and Fatigue (fatigue x1 year, wants to discuss labs from July )      History of Present Illness  Patient comes in today as a new patient to our practice he is transferring his care from Dr. Nhung Mancilla over [facility].  He is here to discuss 2 things one is of increased allergies and second is of ongoing fatigue for the past year.  He requests reviewing his labs that the had in July.  He states he's had trouble having some fatigue and at times shortness of breath for the last couple of years, he has discussed this with his cardiologist Dr. Goff who has investigated it from a cardiac standpoint and did not find anything to cause his symptoms according to patient.  Get a chest x-ray which was normal.  He had some labs done in July and didn't hear back from them.  He looked on my chart and saw that he has some anemia and wanted to investigate this further.  Colonoscopy was done couple of years ago was normal, denies any blood in his stools or blood in his urine.  The following portions of the patient's history were reviewed and updated as appropriate: allergies, current medications, past social history and problem list    Review of Systems   Constitutional: Positive for fatigue.   HENT: Positive for rhinorrhea ( Constant dripping nose for years despite Flonase).    Respiratory: Positive for shortness of breath. Negative for chest tightness.    Cardiovascular: Negative for chest pain and leg swelling.   Genitourinary:        Nocturia twice per night if he does not take ibuprofen, goes away with ibuprofen   Neurological: Negative.    Psychiatric/Behavioral: Negative.        Objective     Vitals:    03/26/18 1307   BP: 120/70   Pulse: 60   Temp: 98.1 °F (36.7 °C)   SpO2: 99%       Physical Exam   Constitutional: He  appears well-developed and well-nourished. No distress.   HENT:   Head: Normocephalic and atraumatic.   Nose: Nose normal.   Neck: No JVD present.   Cardiovascular: Normal rate, regular rhythm, normal heart sounds and intact distal pulses.    No murmur heard.  Pulmonary/Chest: Effort normal and breath sounds normal. No respiratory distress. He exhibits no tenderness.   Abdominal: Soft. He exhibits no distension. There is no tenderness.   Musculoskeletal: He exhibits no edema.   Lymphadenopathy:     He has no cervical adenopathy.   Skin: Skin is warm and dry. He is not diaphoretic. No erythema. No pallor.   Psychiatric: He has a normal mood and affect. His behavior is normal.   Nursing note and vitals reviewed.      Assessment/Plan     Diagnoses and all orders for this visit:    Anemia, unspecified type  -     CBC & Differential  -     Iron  -     Vitamin B12 & Folate  -     Ferritin    Fatigue, unspecified type  -     Basic Metabolic Panel    Chronic vasomotor rhinitis    Dyspnea on exertion    Other orders  -     ipratropium (ATROVENT) 0.03 % nasal spray; 2 sprays into each nostril Every 12 (Twelve) Hours. For runny nose    Check above labs, switch nasal sprays, if labs nondiagnostic and symptoms persist follow-up in office for further evaluation and did look at his chest x-ray report in the chart but it was from 2016 patient his wife says symptoms haven't changed since then.

## 2018-03-27 DIAGNOSIS — E87.5 HYPERKALEMIA: Primary | ICD-10-CM

## 2018-04-26 RX ORDER — ACETAMINOPHEN/DIPHENHYDRAMINE 500MG-25MG
TABLET ORAL
Qty: 60 TABLET | Refills: 6 | Status: SHIPPED | OUTPATIENT
Start: 2018-04-26 | End: 2019-05-19 | Stop reason: SDUPTHER

## 2018-04-26 RX ORDER — ACETAMINOPHEN/DIPHENHYDRAMINE 500MG-25MG
TABLET ORAL
Qty: 60 TABLET | Refills: 6 | Status: SHIPPED | OUTPATIENT
Start: 2018-04-26 | End: 2018-08-22

## 2018-05-14 RX ORDER — SOTALOL HYDROCHLORIDE 80 MG/1
TABLET ORAL
Qty: 60 TABLET | Refills: 6 | Status: SHIPPED | OUTPATIENT
Start: 2018-05-14 | End: 2019-02-27

## 2018-06-13 RX ORDER — TAMSULOSIN HYDROCHLORIDE 0.4 MG/1
CAPSULE ORAL
Qty: 60 CAPSULE | Refills: 3 | OUTPATIENT
Start: 2018-06-13

## 2018-06-15 RX ORDER — TAMSULOSIN HYDROCHLORIDE 0.4 MG/1
CAPSULE ORAL
Qty: 60 CAPSULE | Refills: 3 | OUTPATIENT
Start: 2018-06-15

## 2018-06-20 ENCOUNTER — TELEPHONE (OUTPATIENT)
Dept: FAMILY MEDICINE CLINIC | Facility: CLINIC | Age: 63
End: 2018-06-20

## 2018-06-20 RX ORDER — TAMSULOSIN HYDROCHLORIDE 0.4 MG/1
2 CAPSULE ORAL DAILY
Qty: 60 CAPSULE | Refills: 3 | Status: SHIPPED | OUTPATIENT
Start: 2018-06-20 | End: 2018-10-18 | Stop reason: SDUPTHER

## 2018-06-20 NOTE — TELEPHONE ENCOUNTER
Patient's daughter called. Previous PCP had prescribed medication but cardiologist did last time while he was waiting to get new PCP. I have sent this to pharmacy for patient.

## 2018-06-20 NOTE — TELEPHONE ENCOUNTER
----- Message from Julisa Land sent at 6/19/2018  1:42 PM EDT -----  Contact: WIFE  NEEDS A REFILL CALLED IN FOR:    tamsulosin (FLOMAX) 0.4 MG capsule 24 hr capsule 60 capsule 3 2/5/2018    Sig: take 2 capsules by mouth once daily     [facility] [phone] [facility] [phone] FX

## 2018-08-22 ENCOUNTER — OFFICE VISIT (OUTPATIENT)
Dept: CARDIOLOGY | Facility: CLINIC | Age: 63
End: 2018-08-22

## 2018-08-22 VITALS
DIASTOLIC BLOOD PRESSURE: 60 MMHG | WEIGHT: 192 LBS | SYSTOLIC BLOOD PRESSURE: 100 MMHG | HEIGHT: 71 IN | BODY MASS INDEX: 26.88 KG/M2 | HEART RATE: 52 BPM

## 2018-08-22 DIAGNOSIS — I48.0 PAROXYSMAL ATRIAL FIBRILLATION (HCC): Primary | ICD-10-CM

## 2018-08-22 PROCEDURE — 99213 OFFICE O/P EST LOW 20 MIN: CPT | Performed by: PHYSICIAN ASSISTANT

## 2018-08-22 PROCEDURE — 93000 ELECTROCARDIOGRAM COMPLETE: CPT | Performed by: PHYSICIAN ASSISTANT

## 2018-08-22 NOTE — PROGRESS NOTES
[facility]   OFFICE NOTE      Jason Lane  1955  PCP: Anali Weinberg PA-C    SUBJECTIVE:   Jason Lane is a 62 y.o. male seen for a follow up visit regarding the following: Afib, DCM, HTN    CC:Afib    PROBLEM LIST:   1. Atrial fibrillation:  a. Rapid ventricular response of unknown duration, hospitalized on 01/01/2012.   b. Pradaxa and sotalol initiated, 01/02/2012.   c. Echocardiogram, January 2012 with mild mitral regurgitation, ejection fraction 35% to 40%.   d. Echocardiogram, May 2012: Left ventricular ejection fraction of 45% to 50%, mild TR.   e. Echocardiogram, 12/15/2014: EF 50% to 55%. Mild-to-moderate TR.   f. Chadsvasc=1, ASA  2. Alcohol abuse.   3. Tobacco abuse.   4. Hypertension.   5. Acute systolic congestive heart failure secondary to atrial fibrillation with rapid ventricular response.   6. Syncope:  a. Left and right heart catheterization, Dr. Larson, 03/15/2013, with normal pressures and normal coronary arteries: EF 55%  HPI:   Today I saw Mr. Lane for routine follow-up regarding history of atrial fibrillation and hypertension.  He reports that since visit with our office last year he has had no change in his overall health.  He denies worsening chest pain, shortness of breath, palpitations or syncope events.  He reports that since his fall from a ladder 3 or 4 years ago he has had some difficulty low back pain which limits him some.  He is able to get outside and do some yard work without any symptoms.  He reports no episodes of atrial fibrillation.  He reports no adverse effects of taking sotalol therapy.  He has taken aspirin on a regular basis.  He states that when he takes his blood pressure home meds in the 100-120 range.  He does check his heart rate regular basis reports that usually in the 50s.      ROS:  Review of Symptoms:  General: no recent weight loss/gain, weakness or fatigue  Skin: no rashes, lumps, or other skin  changes  HEENT: no dizziness, lightheadedness, or vision changes  Respiratory: no cough or hemoptysis  Cardiovascular: no palpitations, and tachycardia  Gastrointestinal: no black/tarry stools or diarrhea  Urinary: no change in frequency or urgency  Peripheral Vascular: no claudication or leg cramps  Musculoskeletal: +joint pain/stiffness back and right wrist   Psychiatric: no depression or excessive stress  Neurological: no sensory or motor loss, no syncope  Hematologic: no anemia, easy bruising or bleeding  Endocrine: no thyroid problems, nor heat or cold intolerance    Cardiac PMH: (Old records have been reviewed and summarized below)      Past Medical History, Past Surgical History, Family history, Social History, and Medications were all reviewed with the patient today and updated as necessary.         No Known Allergies  Patient Active Problem List   Diagnosis   • Anemia   • Atrial fibrillation (CMS/HCC)   • Benign prostatic hyperplasia   • Constipation   • Reduced libido   • Hypertension   • Shoulder pain   • Seasonal allergic rhinitis   • Screening PSA (prostate specific antigen)   • Eczema   • Alcohol abuse   • Tobacco abuse   • Acute systolic congestive heart failure (CMS/HCC)   • Syncope   • PAF (paroxysmal atrial fibrillation) (CMS/HCC)   • Congestive heart failure (CMS/HCC)   • Skin lesions   • Chronic low back pain without sciatica     Past Medical History:   Diagnosis Date   • Acute systolic congestive heart failure (CMS/HCC)     Acute systolic congestive heart failure secondary to atrial fibrillation with rapid ventricular response.   • Alcohol abuse    • Atrial fibrillation (CMS/HCC)    • Congestive heart failure (CMS/HCC)     History of congestive heart failure with normalized ejection fraction of 50% to 55%, December 2014. Continue lisinopril and beta blocker in the form of sotalol.  He has current class I symptoms   • Hearing loss    • Hypertension    • PAF (paroxysmal atrial fibrillation)  "(CMS/[facility])    • Syncope     Left and right heart catheterization, Dr. Larson, 03/15/2013, with normal pressures and normal coronary arteries: EF 55%.   • Tobacco abuse      Past Surgical History:   Procedure Laterality Date   • WRIST SURGERY      2015     Family History   Problem Relation Age of Onset   • Anemia Other         cousins   • Heart disease Mother    • Thyroid disease Mother    • Hyperlipidemia Mother    • Kidney disease Mother    • Heart disease Maternal Uncle    • Heart disease Paternal Uncle      Social History   Substance Use Topics   • Smoking status: Former Smoker     Types: Cigarettes     Quit date: 1/1/2012   • Smokeless tobacco: Never Used   • Alcohol use 0.6 - 3.6 oz/week     1 - 6 Cans of beer per week      Comment: daily         PHYSICAL EXAM:    /60 (BP Location: Left arm, Patient Position: Sitting)   Pulse 52   Ht 180.3 cm (71\")   Wt 87.1 kg (192 lb)   BMI 26.78 kg/m²        Wt Readings from Last 5 Encounters:   08/22/18 87.1 kg (192 lb)   03/26/18 89.4 kg (197 lb)   12/20/17 90.1 kg (198 lb 9.6 oz)   04/12/17 92.2 kg (203 lb 3.2 oz)   04/05/17 93.4 kg (206 lb)       BP Readings from Last 5 Encounters:   08/22/18 100/60   03/26/18 120/70   12/20/17 118/70   04/12/17 112/72   04/05/17 106/64       General appearance - Alert, well appearing, and in no distress   Mental status - Affect appropriate to mood.  Eyes - Sclerae anicteric,  ENMT - Hearing grossly normal bilaterally, Dental hygiene good.  Neck - Carotids upstroke normal bilaterally, no bruits, no JVD.  Resp - Clear to auscultation, no wheezes, rales or rhonchi, symmetric air entry.  Heart - Normal rate, regular rhythm, normal S1, S2, no murmurs, rubs, clicks or gallops.  GI - Soft, nontender, nondistended, no masses or organomegaly.  Neurological - Grossly intact - normal speech, no focal findings  Musculoskeletal - No joint tenderness, deformity or swelling, no muscular tenderness noted.  Extremities - Peripheral pulses " normal, no pedal edema, no clubbing or cyanosis.  Skin - Normal coloration and turgor.  Psych -  oriented to person, place, and time.    Medical problems and test results were reviewed with the patient today.         EKG: (EKG has been independently visualized by me and summarized below)  12/20/17    ECG 12 Lead  Date/Time: 8/22/2018 1:26 PM  Performed by: YUNI CHAUHAN  Authorized by: YUNI CHAUHAN   Comparison: compared with previous ECG from 12/20/2017  Similar to previous ECG  Rhythm: sinus bradycardia  Rate: normal  ST Segments: ST segments normal  T Waves: T waves normal  QRS axis: normal  Other: no other findings  Clinical impression: normal ECG            ASSESSMENT   1. PAF: Sotalol. No recurrent events. On Asa daily . EKG Sinus bradycardia with acceptable QTC,   2. Asymptomatic Bradycardia  3. HTN: Controlled.   4. DCM: Resolved, EF normal by Echo 2014    PLAN  · Continue Sotalol and ASA  · Return for follow up in 8 months or sooner as needed.     8/22/2018  1:24 PM    Will Rebecca ROLLINS

## 2018-09-19 RX ORDER — LISINOPRIL 40 MG/1
TABLET ORAL
Qty: 60 TABLET | Refills: 6 | Status: SHIPPED | OUTPATIENT
Start: 2018-09-19 | End: 2019-02-27 | Stop reason: SDUPTHER

## 2018-10-15 RX ORDER — TAMSULOSIN HYDROCHLORIDE 0.4 MG/1
CAPSULE ORAL
Qty: 60 CAPSULE | Refills: 3 | Status: CANCELLED | OUTPATIENT
Start: 2018-10-15

## 2018-10-18 RX ORDER — TAMSULOSIN HYDROCHLORIDE 0.4 MG/1
2 CAPSULE ORAL DAILY
Qty: 60 CAPSULE | Refills: 5 | Status: SHIPPED | OUTPATIENT
Start: 2018-10-18 | End: 2019-04-23 | Stop reason: SDUPTHER

## 2018-10-18 NOTE — TELEPHONE ENCOUNTER
----- Message from Julisa Land sent at 10/17/2018 11:37 AM EDT -----  Contact: WIFE   SHE CALLED AND SAID THE REFILL FOR THE FOLLOWING HAS NOT BEEN SENT TO RX: ALSO WANTED TO KNOW IF A DIFFERENT NASAL SPRAY CAN BE CALLED IN FOR HIM. THE LAST ONE SHE PUT HIM ON HE DOES NOT LIKE AND IT DOESN'T WORK:    tamsulosin (FLOMAX) 0.4 MG capsule 24 hr capsule 60 capsule     Sig - Route: Take 2 capsules by mouth Daily. - Oral     [facility] [facility] - [phone]  - [phone] (Phone)  [phone] (Fax)

## 2018-10-18 NOTE — TELEPHONE ENCOUNTER
Patient req refills on Tamsulosin and a different nasal spray ( not Atrovent). Patient was last seen on 03/26/2018, no future appt

## 2018-10-18 NOTE — TELEPHONE ENCOUNTER
I sent in a prescription refill for Flomax as I have not seen him in 6 months he will need to come in for an appointment so we can discuss his symptoms to find the best nasal spray for him.

## 2018-10-22 ENCOUNTER — TELEPHONE (OUTPATIENT)
Dept: FAMILY MEDICINE CLINIC | Facility: CLINIC | Age: 63
End: 2018-10-22

## 2018-10-22 ENCOUNTER — OFFICE VISIT (OUTPATIENT)
Dept: FAMILY MEDICINE CLINIC | Facility: CLINIC | Age: 63
End: 2018-10-22

## 2018-10-22 VITALS
SYSTOLIC BLOOD PRESSURE: 116 MMHG | HEIGHT: 71 IN | HEART RATE: 58 BPM | TEMPERATURE: 98.1 F | WEIGHT: 201 LBS | BODY MASS INDEX: 28.14 KG/M2 | DIASTOLIC BLOOD PRESSURE: 80 MMHG | OXYGEN SATURATION: 99 %

## 2018-10-22 DIAGNOSIS — L21.9 SEBORRHEIC ECZEMA OF SCALP: Primary | ICD-10-CM

## 2018-10-22 DIAGNOSIS — J30.89 SEASONAL ALLERGIC RHINITIS DUE TO OTHER ALLERGIC TRIGGER: ICD-10-CM

## 2018-10-22 DIAGNOSIS — N40.0 BENIGN PROSTATIC HYPERPLASIA WITHOUT LOWER URINARY TRACT SYMPTOMS: ICD-10-CM

## 2018-10-22 DIAGNOSIS — J30.0 VASOMOTOR RHINITIS: ICD-10-CM

## 2018-10-22 DIAGNOSIS — Z85.828 HISTORY OF SKIN CANCER: ICD-10-CM

## 2018-10-22 PROCEDURE — 99213 OFFICE O/P EST LOW 20 MIN: CPT | Performed by: PHYSICIAN ASSISTANT

## 2018-10-22 RX ORDER — MOMETASONE FUROATE 50 UG/1
2 SPRAY, METERED NASAL DAILY
Qty: 1 EACH | Refills: 12 | Status: SHIPPED | OUTPATIENT
Start: 2018-10-22 | End: 2019-02-27

## 2018-10-22 RX ORDER — LEVOCETIRIZINE DIHYDROCHLORIDE 5 MG/1
5 TABLET, FILM COATED ORAL EVERY EVENING
Qty: 30 TABLET | Refills: 5 | Status: SHIPPED | OUTPATIENT
Start: 2018-10-22 | End: 2019-02-27

## 2018-10-22 NOTE — PROGRESS NOTES
Subjective   Jason Lane is a 62 y.o. male  Allergies (Follow up on allergies, req new nasal spray )      History of Present Illness  Patient comes in today concerned with ongoing persistent daily nasal congestion and runny nose.  He states that it is year round but worse in the cold weather/winter season.  Atrovent nasal spray did not help him.  He states Flonase used to but he quit working.  He does have history of BPH and history of glaucoma both of which are very well controlled on medications and he states he has not been told to avoid decongestants by his eye doctor or urologist.  He sees his eye doctor every 3 months.  The following portions of the patient's history were reviewed and updated as appropriate: allergies, current medications, past social history and problem list    Review of Systems   Constitutional: Negative.    HENT: Positive for congestion ( Clear and watery), rhinorrhea and sneezing. Negative for ear pain, facial swelling, nosebleeds, postnasal drip, sinus pain, sinus pressure and voice change.    Eyes: Negative.    Respiratory: Negative.    Genitourinary: Negative.        Objective     Vitals:    10/22/18 1043   BP: 116/80   Pulse: 58   Temp: 98.1 °F (36.7 °C)   SpO2: 99%       Physical Exam   Constitutional: He appears well-developed and well-nourished. No distress.   HENT:   Head: Normocephalic and atraumatic.   Right Ear: External ear normal.   Left Ear: External ear normal.   Nose: Nose normal.   Mouth/Throat: Oropharynx is clear and moist.   Eyes: Conjunctivae are normal.   Cardiovascular: Normal rate, regular rhythm and normal heart sounds.    Pulmonary/Chest: Effort normal and breath sounds normal.   Skin: He is not diaphoretic.   Nursing note and vitals reviewed.      Assessment/Plan     Diagnoses and all orders for this visit:    Seborrheic eczema of scalp  -     Ambulatory Referral to Dermatology    History of skin cancer  -     Ambulatory Referral to Dermatology    Seasonal  allergic rhinitis due to other allergic trigger    Vasomotor rhinitis    Benign prostatic hyperplasia without lower urinary tract symptoms    Other orders  -     Chlorcyclizine-Pseudoephed 25-60 MG tablet; Take 1/2 every 12 hours as needed for congestion  -     mometasone (NASONEX) 50 MCG/ACT nasal spray; 2 sprays into the nostril(s) as directed by provider Daily.    Nasal congestion persists would recommend referral to ENT, also asked patient to make sure he follows up with his ophthalmologist regarding this diagnosis of glaucoma that these medications are approved by his eye doctor.

## 2018-10-22 NOTE — TELEPHONE ENCOUNTER
----- Message from Julisa Land sent at 10/22/2018 11:31 AM EDT -----  Contact: WIFE  INSURANCE WILL NOT COVER PILLS, AND WILL NEED A PA FOR NASAL SPRAY:     Chlorcyclizine-Pseudoephed 25-60 MG Take 1/2 every 12 hours as needed for congestion      Mometasone Furoate 50 MCG/ACT 2 sprays Nasal Daily      Ipratropium Bromide 0.03 % 2 sprays Nasal Every 12 Hours, For runny nose (Not Efficacious)     SAID RX ARE SENDING OVER REQUEST FOR PA

## 2018-10-22 NOTE — TELEPHONE ENCOUNTER
Please call pharmacy and see if any nasal sprays that her steroid nasal sprays are covered other than fluticasone which he has started tried and failed on.  In place of Stahist please call in Xyzal 5 mg 1 nightly for nasal congestion #30.

## 2018-10-23 NOTE — TELEPHONE ENCOUNTER
Patient was verbally notified per Anali to get Rhinocort OTC due to insurance not covering nasal sprays

## 2018-12-10 RX ORDER — SOTALOL HYDROCHLORIDE 80 MG/1
TABLET ORAL
Qty: 60 TABLET | Refills: 11 | Status: SHIPPED | OUTPATIENT
Start: 2018-12-10 | End: 2019-11-06 | Stop reason: SDUPTHER

## 2019-02-27 ENCOUNTER — OFFICE VISIT (OUTPATIENT)
Dept: CARDIOLOGY | Facility: CLINIC | Age: 64
End: 2019-02-27

## 2019-02-27 VITALS
WEIGHT: 204 LBS | HEIGHT: 71 IN | BODY MASS INDEX: 28.56 KG/M2 | DIASTOLIC BLOOD PRESSURE: 60 MMHG | SYSTOLIC BLOOD PRESSURE: 132 MMHG | HEART RATE: 62 BPM

## 2019-02-27 DIAGNOSIS — I48.0 PAF (PAROXYSMAL ATRIAL FIBRILLATION) (HCC): Primary | ICD-10-CM

## 2019-02-27 DIAGNOSIS — I10 ESSENTIAL HYPERTENSION: ICD-10-CM

## 2019-02-27 PROCEDURE — 99213 OFFICE O/P EST LOW 20 MIN: CPT | Performed by: NURSE PRACTITIONER

## 2019-02-27 PROCEDURE — 93000 ELECTROCARDIOGRAM COMPLETE: CPT | Performed by: NURSE PRACTITIONER

## 2019-02-27 RX ORDER — LISINOPRIL 40 MG/1
40 TABLET ORAL 2 TIMES DAILY
Qty: 60 TABLET | Refills: 11 | Status: SHIPPED | OUTPATIENT
Start: 2019-02-27 | End: 2019-04-23 | Stop reason: SDUPTHER

## 2019-02-27 NOTE — PROGRESS NOTES
Jason Lane  1955    There is no work phone number on file.      02/27/2019    [facility] CARDIOLOGY     Blues, Anali FLOWER, PA-C  [address]  [place] [id number]    Chief Complaint   Patient presents with   • Atrial Fibrillation       Problem List:   1. Atrial fibrillation:  a. Rapid ventricular response of unknown duration, hospitalized on 01/01/2012.   b. Pradaxa and sotalol initiated, 01/02/2012.   c. Echocardiogram, January 2012 with mild mitral regurgitation, ejection fraction 35% to 40%.   d. Echocardiogram, May 2012: Left ventricular ejection fraction of 45% to 50%, mild TR.   e. Echocardiogram, 12/15/2014: EF 50% to 55%. Mild-to-moderate TR.   f. Chadsvasc=1, ASA  2. Alcohol abuse.   3. Tobacco abuse.   4. Hypertension.   5. Acute systolic congestive heart failure secondary to atrial fibrillation with rapid ventricular response.   6. Syncope:  a. Left and right heart catheterization, Dr. Larson, 03/15/2013, with normal pressures and normal coronary arteries: EF 55%      Allergies  No Known Allergies    Current Medications    Current Outpatient Medications:   •  B Complex-Biotin-FA (MULTI-B COMPLEX PO), Take 1 capsule by mouth Daily., Disp: , Rfl:   •  dorzolamide (TRUSOPT) 2 % ophthalmic solution, Administer  to both eyes 3 (Three) Times a Day., Disp: , Rfl:   •  ketoconazole (NIZORAL) 2 % shampoo, As Needed., Disp: , Rfl: 0  •  latanoprost (XALATAN) 0.005 % ophthalmic solution, Administer  to both eyes Daily., Disp: , Rfl:   •  lisinopril (PRINIVIL,ZESTRIL) 40 MG tablet, take 1 tablet by mouth twice a day, Disp: 60 tablet, Rfl: 6  •  RA ASPIRIN EC 81 MG EC tablet, take 2 tablets by mouth once daily, Disp: 60 tablet, Rfl: 6  •  sotalol (BETAPACE) 80 MG tablet, TAKE 1 TABLET BY MOUTH TWICE DAILY, Disp: 60 tablet, Rfl: 11  •  tamsulosin (FLOMAX) 0.4 MG capsule 24 hr capsule, Take 2 capsules by mouth Daily., Disp: 60 capsule, Rfl: 5  •  timolol (TIMOPTIC)  "0.5 % ophthalmic solution, Administer  to both eyes Daily., Disp: , Rfl:     History of Present Illness   HPI    Pt presents for follow up of atrial fibrillation on Sotalol. Since we last saw the pt, pt denies any AF episodes, SOB, CP, LH, and dizziness. Denies any hospitalizations, ER visits, bleeding, or TIA/CVA symptoms. Overall feels well.  BP's running 120's at home.      ROS:  General:  Denies fatigue, weight gain or loss  Cardiovascular:  Denies CP, PND, syncope, near syncope, edema or palpitations.  Pulmonary:  Denies JONES, cough, or wheezing      Vitals:    02/27/19 0830   BP: 132/60   BP Location: Right arm   Patient Position: Sitting   Pulse: 62   Weight: 92.5 kg (204 lb)   Height: 180.3 cm (71\")     PE:  General: NAD  Neck: no JVD, no carotid bruits, no TM  Heart RRR, NL S1, S2, no rubs, murmurs  Lungs: CTA, no wheezes, rhonchi, or rales  Abd: soft, non-tender, NL BS  Ext: No musculoskeletal deformities, no edema, cyanosis, or clubbing  Psych: normal mood and affect    Diagnostic Data:        ECG 12 Lead  Date/Time: 2/27/2019 8:44 AM  Performed by: Blanche Hernandez APRN  Authorized by: Blanche Hernandez APRN   Comparison: compared with previous ECG from 8/22/2018  Similar to previous ECG  Rhythm: sinus rhythm  BPM: 62              1. PAF (paroxysmal atrial fibrillation) (CMS/HCC)    2. Essential hypertension          Plan:  1) Paroxysmal atrial fibrillation:  - Maintaining NSR on Sotalol.  QTc acceptable.  - Continue present medications.   2) Anticoagulation:  - CHADSVASc = 1, on ASA  3) HTN:  - Well controlled on current meds  - Wt loss, exercise, salt reduction    F/up in 8 months    SUSANA Dyson  [facility]  2/27/2019  8:49 AM        "

## 2019-04-23 RX ORDER — LISINOPRIL 40 MG/1
TABLET ORAL
Qty: 60 TABLET | Refills: 6 | Status: SHIPPED | OUTPATIENT
Start: 2019-04-23 | End: 2019-11-06 | Stop reason: SDUPTHER

## 2019-04-29 RX ORDER — TAMSULOSIN HYDROCHLORIDE 0.4 MG/1
CAPSULE ORAL
Qty: 60 CAPSULE | Refills: 5 | Status: SHIPPED | OUTPATIENT
Start: 2019-04-29 | End: 2019-12-05 | Stop reason: SDUPTHER

## 2019-05-06 ENCOUNTER — OFFICE VISIT (OUTPATIENT)
Dept: FAMILY MEDICINE CLINIC | Facility: CLINIC | Age: 64
End: 2019-05-06

## 2019-05-06 VITALS
OXYGEN SATURATION: 98 % | RESPIRATION RATE: 14 BRPM | WEIGHT: 194.6 LBS | BODY MASS INDEX: 27.24 KG/M2 | HEIGHT: 71 IN | HEART RATE: 52 BPM | DIASTOLIC BLOOD PRESSURE: 68 MMHG | SYSTOLIC BLOOD PRESSURE: 120 MMHG

## 2019-05-06 DIAGNOSIS — R10.13 MIDEPIGASTRIC PAIN: Primary | ICD-10-CM

## 2019-05-06 DIAGNOSIS — J30.9 ALLERGIC RHINITIS, UNSPECIFIED SEASONALITY, UNSPECIFIED TRIGGER: ICD-10-CM

## 2019-05-06 PROCEDURE — 99214 OFFICE O/P EST MOD 30 MIN: CPT | Performed by: PHYSICIAN ASSISTANT

## 2019-05-06 RX ORDER — PANTOPRAZOLE SODIUM 40 MG/1
40 TABLET, DELAYED RELEASE ORAL DAILY
Qty: 30 TABLET | Refills: 1 | Status: SHIPPED | OUTPATIENT
Start: 2019-05-06 | End: 2021-09-01

## 2019-05-06 RX ORDER — FLUTICASONE PROPIONATE 50 MCG
2 SPRAY, SUSPENSION (ML) NASAL DAILY
Qty: 9.9 ML | Refills: 1 | Status: SHIPPED | OUTPATIENT
Start: 2019-05-06 | End: 2021-09-17 | Stop reason: SDUPTHER

## 2019-05-07 ENCOUNTER — LAB REQUISITION (OUTPATIENT)
Dept: LAB | Facility: HOSPITAL | Age: 64
End: 2019-05-07

## 2019-05-07 DIAGNOSIS — R10.13 EPIGASTRIC PAIN: ICD-10-CM

## 2019-05-07 DIAGNOSIS — Z00.00 ROUTINE GENERAL MEDICAL EXAMINATION AT A HEALTH CARE FACILITY: ICD-10-CM

## 2019-05-07 PROCEDURE — 36415 COLL VENOUS BLD VENIPUNCTURE: CPT

## 2019-05-07 NOTE — PROGRESS NOTES
Subjective   Jason Lane is a 63 y.o. male  Abdominal Pain (Intermittent x1 month and occ radiates to back. Not relieved by BM); Allergies (Req new script for Nasonex); and Constipation (No BM x6 days-usually q3-4 days)      History of Present Illness  Patient is a pleasant 63-year-old white male who comes in complaining of abdominal pain patient has had a large amount of discomfort midepigastric pain radiating to back some nausea patient's pain is 9 out of 10 but has some nausea upset stomach he is been constipated slightly due to the pain    Patient comes in watery itchy eyes nasal congestion nasal pressure,  Mild sore throat, nasal congestion nasal pressure blowing clear drainage from breath no chest pain  The following portions of the patient's history were reviewed and updated as appropriate: allergies, current medications, past social history and problem list    Review of Systems   Constitutional: Negative.    HENT: Negative.    Eyes: Negative.    Respiratory: Negative.    Cardiovascular: Negative.    Gastrointestinal: Positive for abdominal pain.   Endocrine: Negative.    Genitourinary: Negative.    Musculoskeletal: Negative.    Skin: Negative.    Allergic/Immunologic: Negative.    Neurological: Negative.    Hematological: Negative.    Psychiatric/Behavioral: Negative.    All other systems reviewed and are negative.      Objective     Vitals:    05/06/19 1126   BP: 120/68   Pulse: 52   Resp: 14   SpO2: 98%       Physical Exam   Constitutional: He is oriented to person, place, and time. He appears well-developed and well-nourished.   HENT:   Head: Normocephalic and atraumatic.   Right Ear: External ear normal.   Left Ear: External ear normal.   Nose: Nose normal.   Mouth/Throat: Oropharynx is clear and moist.   Eyes: Conjunctivae and EOM are normal. Pupils are equal, round, and reactive to light.   Neck: Normal range of motion. Neck supple. No JVD present. No thyromegaly present.   Cardiovascular: Normal  rate, regular rhythm, normal heart sounds, intact distal pulses and normal pulses.   No murmur heard.  Pulmonary/Chest: Effort normal and breath sounds normal. No respiratory distress.   Abdominal: Soft. Bowel sounds are normal. He exhibits no mass. There is no hepatosplenomegaly. There is no tenderness.       Genitourinary: Rectum normal, prostate normal and penis normal.   Musculoskeletal: Normal range of motion. He exhibits no edema.   Neurological: He is alert and oriented to person, place, and time. He has normal reflexes. No cranial nerve deficit.   Skin: Skin is warm and dry.   Psychiatric: He has a normal mood and affect.   Nursing note and vitals reviewed.      Assessment/Plan     Diagnoses and all orders for this visit:    Midepigastric pain  -     US Gallbladder; Future  -     Comprehensive Metabolic Panel  -     Amylase  -     Lipase  -     H.pylori,IgG / IgA Antibodies; Future  -     pantoprazole (PROTONIX) 40 MG EC tablet; Take 1 tablet by mouth Daily.    Allergic rhinitis, unspecified seasonality, unspecified trigger  -     fluticasone (FLONASE) 50 MCG/ACT nasal spray; 2 sprays into the nostril(s) as directed by provider Daily.    Follow-up if no better

## 2019-05-13 ENCOUNTER — HOSPITAL ENCOUNTER (OUTPATIENT)
Dept: ULTRASOUND IMAGING | Facility: HOSPITAL | Age: 64
Discharge: HOME | End: 2019-05-13
Admitting: PHYSICIAN ASSISTANT

## 2019-05-13 DIAGNOSIS — R10.13 MIDEPIGASTRIC PAIN: ICD-10-CM

## 2019-05-13 PROCEDURE — 76705 ECHO EXAM OF ABDOMEN: CPT

## 2019-05-20 ENCOUNTER — TELEPHONE (OUTPATIENT)
Dept: FAMILY MEDICINE CLINIC | Facility: CLINIC | Age: 64
End: 2019-05-20

## 2019-05-20 RX ORDER — ASPIRIN 81 MG/1
TABLET, COATED ORAL
Qty: 60 TABLET | Refills: 5 | Status: SHIPPED | OUTPATIENT
Start: 2019-05-20 | End: 2019-11-06 | Stop reason: SDUPTHER

## 2019-05-20 NOTE — TELEPHONE ENCOUNTER
[facility] I just called again to have them faxed over.

## 2019-05-20 NOTE — TELEPHONE ENCOUNTER
[facility] said they don't have any labs for him in the last six months and I gave wife their number to see if they can help her. I advised her also that ultrasound of GB was normal.

## 2019-05-20 NOTE — TELEPHONE ENCOUNTER
----- Message from Nicky Bartholomew sent at 5/20/2019  2:24 PM EDT -----  Contact: WIFE:  ARNULFO CANO  PT. [place].  PT. IS WONDERING ABOUT HIS TEST RESULTS THAT WERE SENT TO [facility], (DUE TO INSURANCE PURPOSES), IF THEY ARE BACK YET? (ULCER/GALLBLADDER/PANCREATITIS/KIDNEYS & LIVER).  LAB WORK WAS SENT OUT LAST WED. OR Thursday.    WIFE CAN BE REACHED @ [phone], CELL PHONE #.

## 2019-05-21 ENCOUNTER — TELEPHONE (OUTPATIENT)
Dept: FAMILY MEDICINE CLINIC | Facility: CLINIC | Age: 64
End: 2019-05-21

## 2019-05-21 NOTE — TELEPHONE ENCOUNTER
----- Message from Ella Zapata sent at 5/20/2019  3:52 PM EDT -----  Contact: Patients wife  Patients Wife called in regard to his lab work that went to [facility] and issues I retrieving them.    Lab [facility] stated that they have them are supposed to getting in contact with us.      Please return call and advise.

## 2019-05-23 ENCOUNTER — OFFICE VISIT (OUTPATIENT)
Dept: FAMILY MEDICINE CLINIC | Facility: CLINIC | Age: 64
End: 2019-05-23

## 2019-05-23 VITALS
OXYGEN SATURATION: 99 % | DIASTOLIC BLOOD PRESSURE: 62 MMHG | BODY MASS INDEX: 27.07 KG/M2 | HEART RATE: 55 BPM | SYSTOLIC BLOOD PRESSURE: 112 MMHG | WEIGHT: 193.4 LBS | RESPIRATION RATE: 16 BRPM | HEIGHT: 71 IN

## 2019-05-23 DIAGNOSIS — R10.13 MIDEPIGASTRIC PAIN: ICD-10-CM

## 2019-05-23 PROCEDURE — 99213 OFFICE O/P EST LOW 20 MIN: CPT | Performed by: PHYSICIAN ASSISTANT

## 2019-05-23 RX ORDER — LANSOPRAZOLE, AMOXICILLIN, CLARITHROMYCIN 30-500-500
KIT ORAL 2 TIMES DAILY
Qty: 1 KIT | Refills: 11 | Status: SHIPPED | OUTPATIENT
Start: 2019-05-23 | End: 2019-05-24

## 2019-05-23 NOTE — PROGRESS NOTES
Subjective   Jason Lane is a 63 y.o. male  Abdominal Pain      History of Present Illness  Is a pleasant 63-year-old white male who comes in complaining of midepigastric pain blood work showed a positive H. pylori patient is currently on sotalol, patient states that his stomach does feel better since taking the Protonix but comes in for follow-up for positive H. pylori antibody test pay states he has some mild dyspepsia midepigastric pain radiating to back  The following portions of the patient's history were reviewed and updated as appropriate: allergies, current medications, past social history and problem list    Review of Systems   Constitutional: Negative.    HENT: Negative.    Eyes: Negative.    Respiratory: Negative.    Cardiovascular: Negative.    Gastrointestinal: Negative.    Endocrine: Negative.    Genitourinary: Negative.    Musculoskeletal: Negative.    Skin: Negative.    Allergic/Immunologic: Negative.    Neurological: Negative.    Hematological: Negative.    Psychiatric/Behavioral: Negative.    All other systems reviewed and are negative.      Objective     Vitals:    05/23/19 1406   BP: 112/62   Pulse: 55   Resp: 16   SpO2: 99%       Physical Exam   Constitutional: He is oriented to person, place, and time. He appears well-developed and well-nourished.   HENT:   Head: Normocephalic and atraumatic.   Right Ear: External ear normal.   Left Ear: External ear normal.   Nose: Nose normal.   Mouth/Throat: Oropharynx is clear and moist.   Eyes: Conjunctivae and EOM are normal. Pupils are equal, round, and reactive to light.   Neck: Normal range of motion. Neck supple. No thyromegaly present.   Cardiovascular: Normal rate, regular rhythm, normal heart sounds and intact distal pulses.   No murmur heard.  Pulmonary/Chest: Effort normal and breath sounds normal.   Abdominal: Soft. Bowel sounds are normal. He exhibits no mass. There is no tenderness.   Genitourinary: Rectum normal, prostate normal and  penis normal.   Musculoskeletal: Normal range of motion. He exhibits no edema.   Neurological: He is alert and oriented to person, place, and time. He has normal reflexes. No cranial nerve deficit.   Skin: Skin is warm and dry.   Psychiatric: He has a normal mood and affect.       Assessment/Plan     Diagnoses and all orders for this visit:    Midepigastric pain    Other orders  -     Discontinue: amoxicillin-clarithromycin-lansoprazole (PREVPAC) combo pack; Take  by mouth 2 (Two) Times a Day. Follow package directions.    #1 due to drug interactions with his meds Prevpac was unable to be called in we are somewhat limited what we use for treatment of H. pylori with drug interactions due to his sotalol, will refer to GI for further evaluation

## 2019-05-24 ENCOUNTER — TELEPHONE (OUTPATIENT)
Dept: FAMILY MEDICINE CLINIC | Facility: CLINIC | Age: 64
End: 2019-05-24

## 2019-05-24 NOTE — TELEPHONE ENCOUNTER
----- Message from Julisa Land sent at 5/24/2019 10:58 AM EDT -----  Contact: DAUGHTER  PLEASE CHECK WITH RX, MEDICATION THAT WAS SENT IN YESTERDAY FOR STOMACH WILL INTERACT WITH CARDIO MEDICATION PER DAUGHTER & RX. SHE WANTED TO LET [facility] KNOW THAT HE DRINKS 6 - 12 BEERS A NIGHT AND IF HE CALLS IN A DIFFERENT MEDICATION HE CANNOT TAKE FLAGLE.     ANY QUESTIONS PLEASE CALL HER (ABRAN) [phone]

## 2019-06-07 ENCOUNTER — OFFICE VISIT (OUTPATIENT)
Dept: GASTROENTEROLOGY | Facility: CLINIC | Age: 64
End: 2019-06-07

## 2019-06-07 VITALS
SYSTOLIC BLOOD PRESSURE: 134 MMHG | HEIGHT: 71 IN | BODY MASS INDEX: 27.19 KG/M2 | HEART RATE: 54 BPM | WEIGHT: 194.2 LBS | DIASTOLIC BLOOD PRESSURE: 73 MMHG

## 2019-06-07 DIAGNOSIS — R10.13 DYSPEPSIA: ICD-10-CM

## 2019-06-07 DIAGNOSIS — F10.29 ALCOHOL DEPENDENCE WITH UNSPECIFIED ALCOHOL-INDUCED DISORDER (HCC): ICD-10-CM

## 2019-06-07 DIAGNOSIS — A04.8 H. PYLORI INFECTION: Primary | ICD-10-CM

## 2019-06-07 PROCEDURE — 99214 OFFICE O/P EST MOD 30 MIN: CPT | Performed by: NURSE PRACTITIONER

## 2019-06-07 RX ORDER — AMOXICILLIN 500 MG/1
1000 CAPSULE ORAL 3 TIMES DAILY
Qty: 84 CAPSULE | Refills: 0 | Status: SHIPPED | OUTPATIENT
Start: 2019-06-07 | End: 2019-08-15

## 2019-06-07 NOTE — PROGRESS NOTES
GASTROENTEROLOGY OFFICE NOTE  Jason Lane  [id number]  1955    CARE TEAM  Patient Care Team:  Anali Weinberg PA-C as PCP - General (Family Medicine)   Donny Wood PA-C    Referring Provider: Donny Wood PA-C    Chief Complaint   Patient presents with   • Pain     mid epigastric but it has subsided. Pt says he has been diagnosed h pylori        HISTORY OF PRESENT ILLNESS:  Mr. Lane is a very pleasant 63-year-old male who reports that for the past few months he has been having troubles with epigastric pain and belching.  He was diagnosed with H. pylori by his primary care physician.  There has been trouble in determining appropriate treatment for this.  He takes sotalol which is contraindicated with clindamycin and he also drinks alcohol regularly and therefore is unable to take Flagyl.  He has been taking Protonix 40 mg daily and his epigastric pain has mostly subsided.  He continues to have large amounts of belching and general GI upset, dyspepsia.    The patient admits to drinking 6-8 beers daily.  He reports this is quite a significant cutdown for him as he has consumed much more in the past.    PAST MEDICAL HISTORY  Past Medical History:   Diagnosis Date   • Acute systolic congestive heart failure (CMS/HCC)     Acute systolic congestive heart failure secondary to atrial fibrillation with rapid ventricular response.   • Alcohol abuse    • Atrial fibrillation (CMS/HCC)    • Congestive heart failure (CMS/[facility])     History of congestive heart failure with normalized ejection fraction of 50% to 55%, December 2014. Continue lisinopril and beta blocker in the form of sotalol.  He has current class I symptoms   • Hearing loss    • Hypertension    • PAF (paroxysmal atrial fibrillation) (CMS/[facility])    • Syncope     Left and right heart catheterization, Dr. Larson, 03/15/2013, with normal pressures and normal coronary arteries: EF 55%.   • Tobacco abuse         PAST SURGICAL HISTORY  Past Surgical  History:   Procedure Laterality Date   • WRIST SURGERY      2015        MEDICATIONS:    Current Outpatient Medications:   •  amoxicillin (AMOXIL) 500 MG capsule, Take 2 capsules by mouth 3 (Three) Times a Day., Disp: 84 capsule, Rfl: 0  •  ASPIRIN ADULT LOW DOSE 81 MG EC tablet, TAKE 2 TABLETS BY MOUTH EVERY DAY, Disp: 60 tablet, Rfl: 5  •  B Complex-Biotin-FA (MULTI-B COMPLEX PO), Take 1 capsule by mouth Daily., Disp: , Rfl:   •  dorzolamide (TRUSOPT) 2 % ophthalmic solution, Administer  to both eyes 3 (Three) Times a Day., Disp: , Rfl:   •  fluticasone (FLONASE) 50 MCG/ACT nasal spray, 2 sprays into the nostril(s) as directed by provider Daily., Disp: 9.9 mL, Rfl: 1  •  ketoconazole (NIZORAL) 2 % shampoo, As Needed., Disp: , Rfl: 0  •  latanoprost (XALATAN) 0.005 % ophthalmic solution, Administer  to both eyes Daily., Disp: , Rfl:   •  lisinopril (PRINIVIL,ZESTRIL) 40 MG tablet, TAKE 1 TABLET BY MOUTH TWICE DAILY, Disp: 60 tablet, Rfl: 6  •  pantoprazole (PROTONIX) 40 MG EC tablet, Take 1 tablet by mouth Daily., Disp: 30 tablet, Rfl: 1  •  sotalol (BETAPACE) 80 MG tablet, TAKE 1 TABLET BY MOUTH TWICE DAILY, Disp: 60 tablet, Rfl: 11  •  tamsulosin (FLOMAX) 0.4 MG capsule 24 hr capsule, TAKE 2 CAPSULES BY MOUTH EVERY DAY, Disp: 60 capsule, Rfl: 5  •  timolol (TIMOPTIC) 0.5 % ophthalmic solution, Administer  to both eyes Daily., Disp: , Rfl:     ALLERGIES  No Known Allergies    FAMILY HISTORY:  Family History   Problem Relation Age of Onset   • Anemia Other         cousins   • Heart disease Mother    • Thyroid disease Mother    • Hyperlipidemia Mother    • Kidney disease Mother    • Heart disease Maternal Uncle    • Heart disease Paternal Uncle        SOCIAL HISTORY  Social History     Socioeconomic History   • Marital status:      Spouse name: Not on file   • Number of children: Not on file   • Years of education: Not on file   • Highest education level: Not on file   Tobacco Use   • Smoking status: Former  "Smoker     Types: Cigarettes     Last attempt to quit: 2012     Years since quittin.4   • Smokeless tobacco: Never Used   Substance and Sexual Activity   • Alcohol use: Yes     Alcohol/week: 0.6 - 3.6 oz     Types: 1 - 6 Cans of beer per week     Comment: daily   • Drug use: No   • Sexual activity: Defer       REVIEW OF SYSTEMS  Review of Systems   Constitutional: Negative.    HENT: Negative.    Eyes: Negative.    Respiratory: Negative.    Cardiovascular: Negative.    Gastrointestinal: Positive for indigestion.   Endocrine: Negative.    Genitourinary: Negative.    Musculoskeletal: Negative.    Psychiatric/Behavioral: Negative.        PHYSICAL EXAM   /73   Pulse 54   Ht 180.3 cm (70.98\")   Wt 88.1 kg (194 lb 3.2 oz)   BMI 27.10 kg/m²   Physical Exam   Constitutional: He is oriented to person, place, and time. He appears well-developed and well-nourished.   HENT:   Head: Normocephalic.   Mouth/Throat: Oropharynx is clear and moist.   Eyes: EOM are normal. Pupils are equal, round, and reactive to light.   Neck: Normal range of motion. Neck supple.   Cardiovascular: Normal rate and regular rhythm.   Pulmonary/Chest: Effort normal and breath sounds normal. He has no wheezes. He has no rales.   Abdominal: Soft. Bowel sounds are normal. He exhibits no mass. There is no tenderness. There is no rebound and no guarding. No hernia.   Musculoskeletal: Normal range of motion.   Neurological: He is alert and oriented to person, place, and time. No cranial nerve deficit.   Skin: Skin is warm and dry.   Psychiatric: He has a normal mood and affect. His behavior is normal. Judgment normal.   Nursing note and vitals reviewed.      Results Review:  [facility] records reviewed and discussed with patient.     ASSESSMENT / PLAN  1.  H.  Pylori  2.  Dyspepsia  3.  Alcohol dependence  - Amoxicillin 1 g 3 times daily, Dexilant 60 mg daily, Bismuth 2 tabs 3 times daily for 14 days  - Stool for H. pylori 14 days after end of " treatment (as above)  - Risks of alcohol consumption discussed with patient and family including the risk for liver disease/cirrhosis  - Alcohol abstinence recommended    Return in about 6 weeks (around 7/19/2019).    I discussed the patients findings and my recommendations with patient    SUSANA Young

## 2019-07-11 LAB
H PYLORI AG STL QL IA: NEGATIVE
Lab: NORMAL

## 2019-07-15 ENCOUNTER — OFFICE VISIT (OUTPATIENT)
Dept: GASTROENTEROLOGY | Facility: CLINIC | Age: 64
End: 2019-07-15

## 2019-07-15 VITALS
DIASTOLIC BLOOD PRESSURE: 81 MMHG | WEIGHT: 189.4 LBS | BODY MASS INDEX: 26.52 KG/M2 | HEART RATE: 59 BPM | SYSTOLIC BLOOD PRESSURE: 147 MMHG | HEIGHT: 71 IN

## 2019-07-15 DIAGNOSIS — A04.8 H. PYLORI INFECTION: Primary | ICD-10-CM

## 2019-07-15 PROCEDURE — 99213 OFFICE O/P EST LOW 20 MIN: CPT | Performed by: NURSE PRACTITIONER

## 2019-07-15 NOTE — PROGRESS NOTES
GASTROENTEROLOGY OFFICE NOTE  Jason Lane  [id number]  1955    CARE TEAM  Patient Care Team:  Anali Weinberg PA-C as PCP - General (Family Medicine)    Referring Provider: Anali Weinberg PA-C    Chief Complaint   Patient presents with   • Follow-up     h.pylori        HISTORY OF PRESENT ILLNESS:  Mr. Lane presents in follow-up after treatment for H. pylori.  He completed the regimen without difficulties.  Stool study for H. pylori antigen was negative after treatment complete.  He reports that he is doing very well and he has no further epigastric pain, belching, or reflux symptoms.    PAST MEDICAL HISTORY  Past Medical History:   Diagnosis Date   • Acute systolic congestive heart failure (CMS/HCC)     Acute systolic congestive heart failure secondary to atrial fibrillation with rapid ventricular response.   • Alcohol abuse    • Atrial fibrillation (CMS/HCC)    • Congestive heart failure (CMS/HCC)     History of congestive heart failure with normalized ejection fraction of 50% to 55%, December 2014. Continue lisinopril and beta blocker in the form of sotalol.  He has current class I symptoms   • Hearing loss    • Hypertension    • PAF (paroxysmal atrial fibrillation) (CMS/HCC)    • Syncope     Left and right heart catheterization, Dr. Larson, 03/15/2013, with normal pressures and normal coronary arteries: EF 55%.   • Tobacco abuse         PAST SURGICAL HISTORY  Past Surgical History:   Procedure Laterality Date   • WRIST SURGERY      2015        MEDICATIONS:    Current Outpatient Medications:   •  amoxicillin (AMOXIL) 500 MG capsule, Take 2 capsules by mouth 3 (Three) Times a Day., Disp: 84 capsule, Rfl: 0  •  ASPIRIN ADULT LOW DOSE 81 MG EC tablet, TAKE 2 TABLETS BY MOUTH EVERY DAY, Disp: 60 tablet, Rfl: 5  •  B Complex-Biotin-FA (MULTI-B COMPLEX PO), Take 1 capsule by mouth Daily., Disp: , Rfl:   •  dorzolamide (TRUSOPT) 2 % ophthalmic solution, Administer  to both eyes 3 (Three) Times a Day.,  Disp: , Rfl:   •  fluticasone (FLONASE) 50 MCG/ACT nasal spray, 2 sprays into the nostril(s) as directed by provider Daily., Disp: 9.9 mL, Rfl: 1  •  ketoconazole (NIZORAL) 2 % shampoo, As Needed., Disp: , Rfl: 0  •  latanoprost (XALATAN) 0.005 % ophthalmic solution, Administer  to both eyes Daily., Disp: , Rfl:   •  lisinopril (PRINIVIL,ZESTRIL) 40 MG tablet, TAKE 1 TABLET BY MOUTH TWICE DAILY, Disp: 60 tablet, Rfl: 6  •  pantoprazole (PROTONIX) 40 MG EC tablet, Take 1 tablet by mouth Daily., Disp: 30 tablet, Rfl: 1  •  sotalol (BETAPACE) 80 MG tablet, TAKE 1 TABLET BY MOUTH TWICE DAILY, Disp: 60 tablet, Rfl: 11  •  tamsulosin (FLOMAX) 0.4 MG capsule 24 hr capsule, TAKE 2 CAPSULES BY MOUTH EVERY DAY, Disp: 60 capsule, Rfl: 5  •  timolol (TIMOPTIC) 0.5 % ophthalmic solution, Administer  to both eyes Daily., Disp: , Rfl:     ALLERGIES  No Known Allergies    FAMILY HISTORY:  Family History   Problem Relation Age of Onset   • Anemia Other         cousins   • Heart disease Mother    • Thyroid disease Mother    • Hyperlipidemia Mother    • Kidney disease Mother    • Heart disease Maternal Uncle    • Heart disease Paternal Uncle        SOCIAL HISTORY  Social History     Socioeconomic History   • Marital status:      Spouse name: Not on file   • Number of children: Not on file   • Years of education: Not on file   • Highest education level: Not on file   Tobacco Use   • Smoking status: Former Smoker     Types: Cigarettes     Last attempt to quit: 2012     Years since quittin.5   • Smokeless tobacco: Never Used   Substance and Sexual Activity   • Alcohol use: Yes     Alcohol/week: 0.6 - 3.6 oz     Types: 1 - 6 Cans of beer per week     Comment: daily   • Drug use: No   • Sexual activity: Defer       REVIEW OF SYSTEMS  Review of Systems   Constitutional: Negative.    HENT: Negative.    Eyes: Negative.    Respiratory: Negative.    Cardiovascular: Negative.    Gastrointestinal: Negative.    Genitourinary:  "Negative.    Musculoskeletal: Negative.    Skin: Negative.    Neurological: Negative.    Psychiatric/Behavioral: Negative.          PHYSICAL EXAM   /81   Pulse 59   Ht 180.3 cm (70.98\")   Wt 85.9 kg (189 lb 6.4 oz)   BMI 26.43 kg/m²   Physical Exam   Constitutional: He is oriented to person, place, and time. He appears well-developed and well-nourished.   HENT:   Head: Normocephalic.   Mouth/Throat: Oropharynx is clear and moist.   Eyes: EOM are normal. Pupils are equal, round, and reactive to light.   Neck: Normal range of motion. Neck supple.   Cardiovascular: Normal rate and regular rhythm.   Pulmonary/Chest: Effort normal and breath sounds normal. He has no wheezes. He has no rales.   Abdominal: Soft. Bowel sounds are normal. He exhibits no mass. There is no tenderness. There is no rebound and no guarding. No hernia.   Musculoskeletal: Normal range of motion.   Neurological: He is alert and oriented to person, place, and time. No cranial nerve deficit.   Skin: Skin is warm and dry.   Psychiatric: He has a normal mood and affect. His behavior is normal. Judgment normal.   Nursing note and vitals reviewed.    Results Review:  I have reviewed the patient's new clinical results.    ASSESSMENT / PLAN  1.  H. pylori  -Now resolved  - No further treatment indicated.    Return if symptoms worsen or fail to improve.    I discussed the patients findings and my recommendations with patient    SUSANA Young                    "

## 2019-08-15 ENCOUNTER — OFFICE VISIT (OUTPATIENT)
Dept: FAMILY MEDICINE CLINIC | Facility: CLINIC | Age: 64
End: 2019-08-15

## 2019-08-15 VITALS
HEIGHT: 71 IN | DIASTOLIC BLOOD PRESSURE: 84 MMHG | BODY MASS INDEX: 26.6 KG/M2 | WEIGHT: 190 LBS | SYSTOLIC BLOOD PRESSURE: 122 MMHG | HEART RATE: 56 BPM | OXYGEN SATURATION: 98 % | TEMPERATURE: 97.9 F

## 2019-08-15 DIAGNOSIS — L98.9 SKIN LESION: Primary | ICD-10-CM

## 2019-08-15 DIAGNOSIS — Z85.828 HISTORY OF NONMELANOMA SKIN CANCER: ICD-10-CM

## 2019-08-15 DIAGNOSIS — J31.0 CHRONIC RHINITIS: ICD-10-CM

## 2019-08-15 PROCEDURE — 99214 OFFICE O/P EST MOD 30 MIN: CPT | Performed by: PHYSICIAN ASSISTANT

## 2019-08-15 RX ORDER — IPRATROPIUM BROMIDE 42 UG/1
2 SPRAY, METERED NASAL 4 TIMES DAILY
Qty: 15 ML | Refills: 12 | Status: SHIPPED | OUTPATIENT
Start: 2019-08-15 | End: 2021-09-01

## 2019-08-15 NOTE — PROGRESS NOTES
Subjective   Jason Lane is a 63 y.o. male  Nasal Drainage (Ongoing nasal drainage for numerous months ) and Referral needed (concerned about skin lesions that were previously removed, wants new referral to derm)      History of Present Illness  Patient comes in for 2 separate issues he states he has had problems with ongoing persistent clear nasal drainage for the past year.  He states is always clear no trouble breathing through his nose he states it just randomly starts tripping and watering.  Flonase is not helping over-the-counter antihistamines are not helping.  He would like an allergy referral to be tested.  He also is concerned about a couple skin lesions on his neck and ear.  He states he was diagnosed with a skin cancer which was removed in the past on his right ear he is afraid that there is something coming back in that area.  He requesting a different dermatologist than before.  The following portions of the patient's history were reviewed and updated as appropriate: allergies, current medications, past social history and problem list    Review of Systems   Constitutional: Negative for fever.   HENT: Positive for rhinorrhea. Negative for congestion, nosebleeds, postnasal drip, sinus pressure, sinus pain, sneezing and sore throat.    Eyes: Negative.    Respiratory: Negative.    Musculoskeletal: Negative for arthralgias.   Skin: Positive for color change and rash. Negative for pallor and wound.       Objective     Vitals:    08/15/19 1428   BP: 122/84   Pulse: 56   Temp: 97.9 °F (36.6 °C)   SpO2: 98%       Physical Exam   Constitutional: He appears well-developed and well-nourished. No distress.   HENT:   Head: Normocephalic and atraumatic.   Right Ear: External ear normal.   Left Ear: External ear normal.   Nose: Nose normal.   Mouth/Throat: Oropharynx is clear and moist. No oropharyngeal exudate.   Eyes: Conjunctivae are normal. Right eye exhibits no discharge. Left eye exhibits no discharge.    Neck: Normal range of motion. Neck supple.   Cardiovascular: Normal rate, regular rhythm and normal heart sounds.   Pulmonary/Chest: Effort normal and breath sounds normal. No respiratory distress.   Lymphadenopathy:     He has no cervical adenopathy.   Skin: Skin is warm and dry. No rash noted. He is not diaphoretic. There is erythema. No pallor.   Several erythematous based scaly lesions on right lateral neck and right ear.  No ulcerations.   Nursing note and vitals reviewed.      Assessment/Plan     Diagnoses and all orders for this visit:    Skin lesion  -     Ambulatory Referral to Dermatology    History of nonmelanoma skin cancer  -     Ambulatory Referral to Dermatology    Chronic rhinitis  -     Ambulatory Referral to Allergy    Other orders  -     ipratropium (ATROVENT) 0.06 % nasal spray; 2 sprays into the nostril(s) as directed by provider 4 (Four) Times a Day. As needed for runny nose

## 2019-11-06 ENCOUNTER — OFFICE VISIT (OUTPATIENT)
Dept: CARDIOLOGY | Facility: CLINIC | Age: 64
End: 2019-11-06

## 2019-11-06 VITALS
DIASTOLIC BLOOD PRESSURE: 74 MMHG | WEIGHT: 201 LBS | SYSTOLIC BLOOD PRESSURE: 128 MMHG | HEART RATE: 63 BPM | BODY MASS INDEX: 28.14 KG/M2 | OXYGEN SATURATION: 98 % | HEIGHT: 71 IN

## 2019-11-06 DIAGNOSIS — I10 ESSENTIAL HYPERTENSION: ICD-10-CM

## 2019-11-06 DIAGNOSIS — I48.0 PAF (PAROXYSMAL ATRIAL FIBRILLATION) (HCC): Primary | ICD-10-CM

## 2019-11-06 PROCEDURE — 93000 ELECTROCARDIOGRAM COMPLETE: CPT | Performed by: INTERNAL MEDICINE

## 2019-11-06 PROCEDURE — 99213 OFFICE O/P EST LOW 20 MIN: CPT | Performed by: INTERNAL MEDICINE

## 2019-11-06 RX ORDER — LISINOPRIL 40 MG/1
40 TABLET ORAL 2 TIMES DAILY
Qty: 60 TABLET | Refills: 6 | Status: SHIPPED | OUTPATIENT
Start: 2019-11-06 | End: 2020-02-03 | Stop reason: SDUPTHER

## 2019-11-06 RX ORDER — ASPIRIN 81 MG/1
162 TABLET ORAL DAILY
Qty: 60 TABLET | Refills: 5 | Status: SHIPPED | OUTPATIENT
Start: 2019-11-06 | End: 2020-02-03 | Stop reason: SDUPTHER

## 2019-11-06 RX ORDER — SOTALOL HYDROCHLORIDE 80 MG/1
80 TABLET ORAL 2 TIMES DAILY
Qty: 60 TABLET | Refills: 11 | Status: SHIPPED | OUTPATIENT
Start: 2019-11-06 | End: 2020-02-03 | Stop reason: SDUPTHER

## 2019-11-06 NOTE — PROGRESS NOTES
Jason Lane  1955    There is no work phone number on file.      11/06/2019    [facility] CARDIOLOGY     Blues, Anali FLOWER, PA-C  [address]  [place] [phone]    Chief Complaint   Patient presents with   • Atrial Fibrillation       Problem List:   1. Atrial fibrillation:  a. Rapid ventricular response of unknown duration, hospitalized on 01/01/2012.   b. Pradaxa and sotalol initiated, 01/02/2012.   c. Echocardiogram, January 2012 with mild mitral regurgitation, ejection fraction 35% to 40%.   d. Echocardiogram, May 2012: Left ventricular ejection fraction of 45% to 50%, mild TR.   e. Echocardiogram, 12/15/2014: EF 50% to 55%. Mild-to-moderate TR.   f. Chadsvasc=1, ASA  2. Alcohol abuse.   3. Tobacco abuse.   4. Hypertension.   5. Acute systolic congestive heart failure secondary to atrial fibrillation with rapid ventricular response.   6. Syncope:  a. Left and right heart catheterization, Dr. Larson, 03/15/2013, with normal pressures and normal coronary arteries: EF 55%    Allergies  No Known Allergies    Current Medications    Current Outpatient Medications:   •  ASPIRIN ADULT LOW DOSE 81 MG EC tablet, TAKE 2 TABLETS BY MOUTH EVERY DAY, Disp: 60 tablet, Rfl: 5  •  dorzolamide (TRUSOPT) 2 % ophthalmic solution, Administer  to both eyes 3 (Three) Times a Day., Disp: , Rfl:   •  fluticasone (FLONASE) 50 MCG/ACT nasal spray, 2 sprays into the nostril(s) as directed by provider Daily., Disp: 9.9 mL, Rfl: 1  •  ipratropium (ATROVENT) 0.06 % nasal spray, 2 sprays into the nostril(s) as directed by provider 4 (Four) Times a Day. As needed for runny nose, Disp: 15 mL, Rfl: 12  •  ketoconazole (NIZORAL) 2 % shampoo, As Needed., Disp: , Rfl: 0  •  latanoprost (XALATAN) 0.005 % ophthalmic solution, Administer  to both eyes Daily., Disp: , Rfl:   •  lisinopril (PRINIVIL,ZESTRIL) 40 MG tablet, TAKE 1 TABLET BY MOUTH TWICE DAILY, Disp: 60 tablet, Rfl: 6  •  pantoprazole  "(PROTONIX) 40 MG EC tablet, Take 1 tablet by mouth Daily., Disp: 30 tablet, Rfl: 1  •  sotalol (BETAPACE) 80 MG tablet, TAKE 1 TABLET BY MOUTH TWICE DAILY, Disp: 60 tablet, Rfl: 11  •  tamsulosin (FLOMAX) 0.4 MG capsule 24 hr capsule, TAKE 2 CAPSULES BY MOUTH EVERY DAY, Disp: 60 capsule, Rfl: 5  •  timolol (TIMOPTIC) 0.5 % ophthalmic solution, Administer  to both eyes Daily., Disp: , Rfl:     History of Present Illness   HPI    Pt presents for follow up of atrial fibrillation, HTN. Since we last saw the pt, pt denies any AF episodes, SOB, CP, LH, and dizziness. Denies any hospitalizations, ER visits, bleeding, or TIA/CVA symptoms. Overall feels well.    ROS:  General:  Denies fatigue, weight gain or loss  Cardiovascular:  Denies CP, PND, syncope, near syncope, edema or palpitations.  Pulmonary:  Denies JONES, cough, or wheezing      Vitals:    11/06/19 0941   BP: 128/74   BP Location: Right arm   Patient Position: Sitting   Pulse: 63   SpO2: 98%   Weight: 91.2 kg (201 lb)   Height: 180.3 cm (71\")     PE:  General: NAD  Neck: no JVD, no carotid bruits, no TM  Heart RRR, NL S1, S2, S4 present, no rubs, murmurs  Lungs: CTA, no wheezes, rhonchi, or rales  Abd: soft, non-tender, NL BS  Ext: No musculoskeletal deformities, no edema, cyanosis, or clubbing  Psych: normal mood and affect    Diagnostic Data:        ECG 12 Lead  Date/Time: 11/6/2019 10:15 AM  Performed by: Jeffrey Goff MD  Authorized by: Jeffrey Goff MD   Comparison: compared with previous ECG from 2/27/2019  Similar to previous ECG  Rhythm: sinus rhythm  BPM: 63              1. PAF (paroxysmal atrial fibrillation) (CMS/HCC)    2. Essential hypertension          Plan:  1) Paroxysmal atrial fibrillation:  - Maintaining NSR on Sotalol.  QTc acceptable today.   - Continue present medications.   2) Anticoagulation:  - CHADSVASc = 1, on ASA  3) HTN:  - Well controlled on Lisinopril  - Wt loss, exercise, salt reduction    F/up in 8 months    Scribed for " Jeffrey Goff MD by Blanche Hernandez, APRN. 11/6/2019  10:17 AM     I, Jeffrey Goff MD, personally performed the services described in this documentation as scribed by the above named individual in my presence, and it is both accurate and complete.  11/6/2019  10:22 AM

## 2019-12-05 RX ORDER — TAMSULOSIN HYDROCHLORIDE 0.4 MG/1
2 CAPSULE ORAL DAILY
Qty: 60 CAPSULE | Refills: 5 | Status: SHIPPED | OUTPATIENT
Start: 2019-12-05 | End: 2020-02-03 | Stop reason: SDUPTHER

## 2020-01-20 ENCOUNTER — TELEPHONE (OUTPATIENT)
Dept: FAMILY MEDICINE CLINIC | Facility: CLINIC | Age: 65
End: 2020-01-20

## 2020-01-20 NOTE — TELEPHONE ENCOUNTER
Patient wife called and stated that the patient has a some ear wax build lately. They have tried over the counter meds for over the last couple weeks. Would like to be advised on what do next, also stated that it has been hard for him to hear also. Please advise at [phone].

## 2020-01-20 NOTE — TELEPHONE ENCOUNTER
I would advise patient schedule appointment to come in to our office and be seen and evaluated where we can attempt to remove the wax in the office if that is the problem.

## 2020-01-22 ENCOUNTER — OFFICE VISIT (OUTPATIENT)
Dept: FAMILY MEDICINE CLINIC | Facility: CLINIC | Age: 65
End: 2020-01-22

## 2020-01-22 VITALS
HEIGHT: 71 IN | BODY MASS INDEX: 28.73 KG/M2 | WEIGHT: 205.2 LBS | HEART RATE: 68 BPM | SYSTOLIC BLOOD PRESSURE: 142 MMHG | TEMPERATURE: 98.3 F | DIASTOLIC BLOOD PRESSURE: 86 MMHG | OXYGEN SATURATION: 99 %

## 2020-01-22 DIAGNOSIS — H91.91 HEARING LOSS OF RIGHT EAR, UNSPECIFIED HEARING LOSS TYPE: ICD-10-CM

## 2020-01-22 DIAGNOSIS — J30.0 NONALLERGIC VASOMOTOR RHINITIS: ICD-10-CM

## 2020-01-22 DIAGNOSIS — H61.23 BILATERAL IMPACTED CERUMEN: ICD-10-CM

## 2020-01-22 DIAGNOSIS — H92.02 LEFT EAR PAIN: Primary | ICD-10-CM

## 2020-01-22 PROCEDURE — 99212 OFFICE O/P EST SF 10 MIN: CPT | Performed by: PHYSICIAN ASSISTANT

## 2020-01-22 PROCEDURE — 69209 REMOVE IMPACTED EAR WAX UNI: CPT | Performed by: PHYSICIAN ASSISTANT

## 2020-01-22 NOTE — PROGRESS NOTES
Subjective   Jason Lane is a 64 y.o. male  Cerumen Impaction (Bilateral Cerumen impaction)      History of Present Illness  Patient comes in today for evaluation treatment of decreased hearing in both ears.  He states he already has trouble with hearing loss and wears hearing aids but over the course of the past couple days he has noticed that he cannot hear anything out of his right ear.  He is also felt some ear pain in his left ear.  The following portions of the patient's history were reviewed and updated as appropriate: allergies, current medications, past social history and problem list    Review of Systems   Constitutional: Negative.    HENT: Positive for ear pain, hearing loss and rhinorrhea. Negative for dental problem, ear discharge, sinus pressure and sore throat.    Respiratory: Negative.    Cardiovascular: Negative.    Neurological: Negative.        Objective     Vitals:    01/22/20 1444   BP: 142/86   Pulse: 68   Temp: 98.3 °F (36.8 °C)   SpO2: 99%       Physical Exam   Constitutional: He is oriented to person, place, and time. He appears well-developed and well-nourished. No distress.   HENT:   Head: Normocephalic and atraumatic.   Right Ear: Decreased hearing is noted.   Left Ear: Decreased hearing is noted.   Nose: Rhinorrhea ( Clear rhinorrhea noted) present. No mucosal edema.   Cerumen impaction noted bilaterally, irrigated for removal of cerumen successfully in right ear canal and left ear canal, perimeter of left ear canal blood-tinged and erythematous noted after removal of cerumen.   Cardiovascular: Normal rate and regular rhythm.   Pulmonary/Chest: Effort normal.   Neurological: He is alert and oriented to person, place, and time.   Skin: Skin is warm and dry. He is not diaphoretic.   Psychiatric: He has a normal mood and affect.   Nursing note and vitals reviewed.      Assessment/Plan     Jason was seen today for cerumen impaction.    Diagnoses and all orders for this visit:    Left  ear pain    Hearing loss of right ear, unspecified hearing loss type    Bilateral impacted cerumen    Nonallergic vasomotor rhinitis    Recommended 1 capful of hydrogen peroxide over-the-counter nightly for 3 nights to left ear canal, leave hearing aid out for 3 nights and then reinsert, follow-up for recheck if ear pain persist at that time.  Recommended continuing with current prescriptions of nasal sprays for vasomotor rhinitis, also recommended adding on nasal saline irrigation daily.

## 2020-02-03 ENCOUNTER — TELEPHONE (OUTPATIENT)
Dept: FAMILY MEDICINE CLINIC | Facility: CLINIC | Age: 65
End: 2020-02-03

## 2020-02-03 RX ORDER — LISINOPRIL 40 MG/1
40 TABLET ORAL 2 TIMES DAILY
Qty: 180 TABLET | Refills: 3 | Status: SHIPPED | OUTPATIENT
Start: 2020-02-03 | End: 2020-11-17 | Stop reason: SDUPTHER

## 2020-02-03 RX ORDER — ASPIRIN 81 MG/1
162 TABLET ORAL DAILY
Qty: 180 TABLET | Refills: 3 | Status: SHIPPED | OUTPATIENT
Start: 2020-02-03 | End: 2020-11-17 | Stop reason: SDUPTHER

## 2020-02-03 RX ORDER — SOTALOL HYDROCHLORIDE 80 MG/1
80 TABLET ORAL 2 TIMES DAILY
Qty: 180 TABLET | Refills: 3 | Status: SHIPPED | OUTPATIENT
Start: 2020-02-03 | End: 2020-11-17 | Stop reason: SDUPTHER

## 2020-02-03 RX ORDER — TAMSULOSIN HYDROCHLORIDE 0.4 MG/1
2 CAPSULE ORAL DAILY
Qty: 180 CAPSULE | Refills: 3 | Status: SHIPPED | OUTPATIENT
Start: 2020-02-03 | End: 2021-09-01

## 2020-02-03 NOTE — TELEPHONE ENCOUNTER
Patients wife called in stating that the insurance notified her that in order for this RX to be covered, it has to be a 90 day supply of -tamsulosin (FLOMAX) 0.4 MG capsule 24 hr capsule    supposed to get a fax regarding situation from [facility]

## 2020-06-11 ENCOUNTER — OFFICE VISIT (OUTPATIENT)
Dept: CARDIOLOGY | Facility: CLINIC | Age: 65
End: 2020-06-11

## 2020-06-11 VITALS
WEIGHT: 202.4 LBS | DIASTOLIC BLOOD PRESSURE: 72 MMHG | TEMPERATURE: 97.7 F | BODY MASS INDEX: 28.34 KG/M2 | SYSTOLIC BLOOD PRESSURE: 126 MMHG | HEIGHT: 71 IN | HEART RATE: 60 BPM | OXYGEN SATURATION: 98 %

## 2020-06-11 DIAGNOSIS — I10 ESSENTIAL HYPERTENSION: ICD-10-CM

## 2020-06-11 DIAGNOSIS — I48.0 PAF (PAROXYSMAL ATRIAL FIBRILLATION) (HCC): Primary | ICD-10-CM

## 2020-06-11 PROCEDURE — 99213 OFFICE O/P EST LOW 20 MIN: CPT | Performed by: NURSE PRACTITIONER

## 2020-06-11 PROCEDURE — 93000 ELECTROCARDIOGRAM COMPLETE: CPT | Performed by: NURSE PRACTITIONER

## 2020-06-11 NOTE — PROGRESS NOTES
Jason Lane  1955    There is no work phone number on file.      06/11/2020    [facility] CARDIOLOGY     Blues, Anali FLOWER PA-C  [address]  [place] [phone]    Chief Complaint   Patient presents with   • PAF       Problem List:   1. Atrial fibrillation:  a. Rapid ventricular response of unknown duration, hospitalized on 01/01/2012.   b. Pradaxa and sotalol initiated, 01/02/2012.   c. Echocardiogram, January 2012 with mild mitral regurgitation, ejection fraction 35% to 40%.   d. Echocardiogram, May 2012: Left ventricular ejection fraction of 45% to 50%, mild TR.   e. Echocardiogram, 12/15/2014: EF 50% to 55%. Mild-to-moderate TR.   f. Chadsvasc=1, ASA  2. Alcohol abuse.   3. Tobacco abuse.   4. Hypertension.   5. Acute systolic congestive heart failure secondary to atrial fibrillation with rapid ventricular response.   6. Syncope:  a. Left and right heart catheterization, Dr. Larson, 03/15/2013, with normal pressures and normal coronary arteries: EF 55%    Allergies  No Known Allergies    Current Medications    Current Outpatient Medications:   •  aspirin (ASPIRIN ADULT LOW DOSE) 81 MG EC tablet, Take 2 tablets by mouth Daily., Disp: 180 tablet, Rfl: 3  •  dorzolamide (TRUSOPT) 2 % ophthalmic solution, Administer  to both eyes 3 (Three) Times a Day., Disp: , Rfl:   •  fluticasone (FLONASE) 50 MCG/ACT nasal spray, 2 sprays into the nostril(s) as directed by provider Daily., Disp: 9.9 mL, Rfl: 1  •  ipratropium (ATROVENT) 0.06 % nasal spray, 2 sprays into the nostril(s) as directed by provider 4 (Four) Times a Day. As needed for runny nose, Disp: 15 mL, Rfl: 12  •  ketoconazole (NIZORAL) 2 % shampoo, As Needed., Disp: , Rfl: 0  •  latanoprost (XALATAN) 0.005 % ophthalmic solution, Administer  to both eyes Daily., Disp: , Rfl:   •  lisinopril (PRINIVIL,ZESTRIL) 40 MG tablet, Take 1 tablet by mouth 2 (Two) Times a Day., Disp: 180 tablet, Rfl: 3  •  pantoprazole  "(PROTONIX) 40 MG EC tablet, Take 1 tablet by mouth Daily., Disp: 30 tablet, Rfl: 1  •  sotalol (BETAPACE) 80 MG tablet, Take 1 tablet by mouth 2 (Two) Times a Day., Disp: 180 tablet, Rfl: 3  •  tamsulosin (FLOMAX) 0.4 MG capsule 24 hr capsule, Take 2 capsules by mouth Daily., Disp: 180 capsule, Rfl: 3  •  timolol (TIMOPTIC) 0.5 % ophthalmic solution, Administer  to both eyes Daily., Disp: , Rfl:     History of Present Illness   HPI    Pt presents for follow up of atrial fibrillation, HTN. Since we last saw the pt, pt denies any AF episodes, SOB, CP, LH, or dizziness.  Denies any hospitalizations, ER visits, bleeding, or TIA/CVA symptoms. Overall feels well.  BP running well at home.     ROS:  General:  Denies fatigue, weight gain or loss  Cardiovascular:  Denies CP, PND, syncope, near syncope, edema or palpitations.  Pulmonary:  Denies JONES, cough, or wheezing      Vitals:    06/11/20 1418   BP: 126/72   BP Location: Left arm   Patient Position: Sitting   Pulse: 60   Temp: 97.7 °F (36.5 °C)   SpO2: 98%   Weight: 91.8 kg (202 lb 6.4 oz)   Height: 180.3 cm (71\")     PE:  General: NAD  Neck: no JVD, no carotid bruits, no TM  Heart RRR, NL S1, S2, no rubs, murmurs  Lungs: CTA, no wheezes, rhonchi, or rales  Abd: soft, non-tender, NL BS  Ext: No musculoskeletal deformities, no edema, cyanosis, or clubbing  Psych: normal mood and affect    Diagnostic Data:        ECG 12 Lead  Date/Time: 6/11/2020 2:43 PM  Performed by: Blanche Hernandez APRN  Authorized by: Blanche Hernandez APRN   Comparison: compared with previous ECG from 11/6/2019  Similar to previous ECG  Rhythm: sinus bradycardia  BPM: 53              1. PAF (paroxysmal atrial fibrillation) (CMS/HCC)    2. Essential hypertension          Plan:  1) Paroxysmal atrial fibrillation:  - Maintaining sinus rhythm on Sotalol.  QTc acceptable.  - Continue present medications.   2) Anticoagulation:  - CHADSVASc = 1, on ASA  3) HTN:  - Well controlled on " Lisinopril  - Wt loss, exercise, salt reduction    F/up in 8 months    [facility]  6/11/2020  14:39

## 2020-11-17 RX ORDER — LISINOPRIL 40 MG/1
40 TABLET ORAL 2 TIMES DAILY
Qty: 180 TABLET | Refills: 3 | Status: SHIPPED | OUTPATIENT
Start: 2020-11-17 | End: 2021-08-26

## 2020-11-17 RX ORDER — SOTALOL HYDROCHLORIDE 80 MG/1
80 TABLET ORAL 2 TIMES DAILY
Qty: 180 TABLET | Refills: 3 | Status: SHIPPED | OUTPATIENT
Start: 2020-11-17 | End: 2021-08-26

## 2020-11-17 RX ORDER — ASPIRIN 81 MG/1
162 TABLET ORAL DAILY
Qty: 180 TABLET | Refills: 3 | Status: SHIPPED | OUTPATIENT
Start: 2020-11-17 | End: 2022-04-29

## 2021-02-24 ENCOUNTER — OFFICE VISIT (OUTPATIENT)
Dept: CARDIOLOGY | Facility: CLINIC | Age: 66
End: 2021-02-24

## 2021-02-24 VITALS
HEART RATE: 56 BPM | OXYGEN SATURATION: 96 % | DIASTOLIC BLOOD PRESSURE: 70 MMHG | HEIGHT: 71 IN | BODY MASS INDEX: 27.77 KG/M2 | SYSTOLIC BLOOD PRESSURE: 146 MMHG | WEIGHT: 198.4 LBS

## 2021-02-24 DIAGNOSIS — I10 ESSENTIAL HYPERTENSION: ICD-10-CM

## 2021-02-24 DIAGNOSIS — I48.0 PAROXYSMAL ATRIAL FIBRILLATION (HCC): Primary | ICD-10-CM

## 2021-02-24 PROCEDURE — 99213 OFFICE O/P EST LOW 20 MIN: CPT | Performed by: INTERNAL MEDICINE

## 2021-02-24 PROCEDURE — 93000 ELECTROCARDIOGRAM COMPLETE: CPT | Performed by: INTERNAL MEDICINE

## 2021-02-24 NOTE — PROGRESS NOTES
Jason Raderon  1955  [phone]    02/24/2021    [facility] CARDIOLOGY     Referring Provider: No ref. provider found     Anali Weinberg PA-C  [address]  [place] [phone]    Chief Complaint   Patient presents with   • PAF       Problem List:   1. Atrial fibrillation:  a. Rapid ventricular response of unknown duration, hospitalized on 01/01/2012.   b. Pradaxa and sotalol initiated, 01/02/2012.   c. Echocardiogram, January 2012 with mild mitral regurgitation, ejection fraction 35% to 40%.   d. Echocardiogram, May 2012: Left ventricular ejection fraction of 45% to 50%, mild TR.   e. Echocardiogram, 12/15/2014: EF 50% to 55%. Mild-to-moderate TR.   f. Chadsvasc=1, ASA  2. Alcohol abuse.   3. Tobacco abuse.   4. Hypertension.   5. Acute systolic congestive heart failure secondary to atrial fibrillation with rapid ventricular response.   6. Syncope:  a. Left and right heart catheterization, Dr. Larson, 03/15/2013, with normal pressures and normal coronary arteries: EF 55%       Allergies  No Known Allergies    Current Medications    Current Outpatient Medications:   •  aspirin (aspirin) 81 MG EC tablet, Take 2 tablets by mouth Daily., Disp: 180 tablet, Rfl: 3  •  dorzolamide (TRUSOPT) 2 % ophthalmic solution, Administer  to both eyes 3 (Three) Times a Day., Disp: , Rfl:   •  fluticasone (FLONASE) 50 MCG/ACT nasal spray, 2 sprays into the nostril(s) as directed by provider Daily., Disp: 9.9 mL, Rfl: 1  •  ipratropium (ATROVENT) 0.06 % nasal spray, 2 sprays into the nostril(s) as directed by provider 4 (Four) Times a Day. As needed for runny nose, Disp: 15 mL, Rfl: 12  •  ketoconazole (NIZORAL) 2 % shampoo, As Needed., Disp: , Rfl: 0  •  latanoprost (XALATAN) 0.005 % ophthalmic solution, Administer  to both eyes Daily., Disp: , Rfl:   •  lisinopril (PRINIVIL,ZESTRIL) 40 MG tablet, Take 1 tablet by mouth 2 (Two) Times a Day., Disp: 180 tablet, Rfl: 3  •  pantoprazole  "(PROTONIX) 40 MG EC tablet, Take 1 tablet by mouth Daily., Disp: 30 tablet, Rfl: 1  •  sotalol (BETAPACE) 80 MG tablet, Take 1 tablet by mouth 2 (Two) Times a Day., Disp: 180 tablet, Rfl: 3  •  tamsulosin (FLOMAX) 0.4 MG capsule 24 hr capsule, Take 2 capsules by mouth Daily., Disp: 180 capsule, Rfl: 3  •  timolol (TIMOPTIC) 0.5 % ophthalmic solution, Administer  to both eyes Daily., Disp: , Rfl:     History of Present Illness     Pt presents for follow up of atrial fibrillation, HTN. Since we last saw the pt, pt denies any AF episodes, SOB, CP, LH, or dizziness.  Denies any hospitalizations, ER visits, bleeding ASA, or TIA/CVA symptoms. Overall feels well.  BP well controlled at home.     ROS:    General:  Denies fatigue, weight gain or loss  Cardiovascular:  Denies CP, PND, syncope, near syncope, edema or palpitations.  Pulmonary:  Denies JONES, cough, or wheezing      Vitals:    02/24/21 1452   BP: 146/70   BP Location: Right arm   Patient Position: Sitting   Pulse: 56   SpO2: 96%   Weight: 90 kg (198 lb 6.4 oz)   Height: 180.3 cm (71\")     Body mass index is 27.67 kg/m².  PE:  General: NAD  Neck: no JVD, no carotid bruits, no TM  Heart RRR, NL S1, S2, no rubs, murmurs  Lungs: CTA, no wheezes, rhonchi, or rales  Abd: soft, non-tender, NL BS  Ext: No musculoskeletal deformities, no edema, cyanosis, or clubbing  Psych: normal mood and affect    Diagnostic Data:        ECG 12 Lead    Date/Time: 2/24/2021 3:02 PM  Performed by: Jeffrey Goff MD  Authorized by: Jeffrey Goff MD   Comparison: compared with previous ECG from 6/11/2020  Similar to previous ECG  Rhythm: sinus bradycardia  Ectopy: infrequent PVCs  Rate: normal  BPM: 56              1. Paroxysmal atrial fibrillation (CMS/HCC)    2. Essential hypertension          Plan:    1) Paroxysmal atrial fibrillation:  - Maintaining sinus rhythm on Sotalol.  QTc acceptable.  - Continue present medications.     2) Anticoagulation:  - CHADSVASc = 1, on ASA    3) " HTN:  - Well controlled on Lisinopril  - Wt loss, exercise, salt reduction     F/up in 8 months     Scribed for Jeffrey Goff MD by SUSANA Orellana. 2/24/2021 15:10 EST     I, Jeffrey Goff MD, personally performed the services described in this documentation as scribed by the above named individual in my presence, and it is both accurate and complete.  2/24/2021  15:23 EST

## 2021-03-17 ENCOUNTER — IMMUNIZATION (OUTPATIENT)
Dept: VACCINE CLINIC | Facility: HOSPITAL | Age: 66
End: 2021-03-17

## 2021-03-17 PROCEDURE — 0001A: CPT | Performed by: INTERNAL MEDICINE

## 2021-03-17 PROCEDURE — 91300 HC SARSCOV02 VAC 30MCG/0.3ML IM: CPT | Performed by: INTERNAL MEDICINE

## 2021-04-07 ENCOUNTER — IMMUNIZATION (OUTPATIENT)
Dept: VACCINE CLINIC | Facility: HOSPITAL | Age: 66
End: 2021-04-07

## 2021-04-07 PROCEDURE — 0002A: CPT | Performed by: INTERNAL MEDICINE

## 2021-04-07 PROCEDURE — 91300 HC SARSCOV02 VAC 30MCG/0.3ML IM: CPT | Performed by: INTERNAL MEDICINE

## 2021-08-26 RX ORDER — SOTALOL HYDROCHLORIDE 80 MG/1
TABLET ORAL
Qty: 180 TABLET | Refills: 3 | Status: SHIPPED | OUTPATIENT
Start: 2021-08-26 | End: 2022-05-13 | Stop reason: HOSPADM

## 2021-08-26 RX ORDER — LISINOPRIL 40 MG/1
TABLET ORAL
Qty: 180 TABLET | Refills: 3 | Status: SHIPPED | OUTPATIENT
Start: 2021-08-26 | End: 2022-05-13 | Stop reason: HOSPADM

## 2021-09-01 ENCOUNTER — OFFICE VISIT (OUTPATIENT)
Dept: CARDIOLOGY | Facility: CLINIC | Age: 66
End: 2021-09-01

## 2021-09-01 VITALS
WEIGHT: 191 LBS | DIASTOLIC BLOOD PRESSURE: 72 MMHG | OXYGEN SATURATION: 97 % | SYSTOLIC BLOOD PRESSURE: 158 MMHG | HEIGHT: 71 IN | HEART RATE: 52 BPM | BODY MASS INDEX: 26.74 KG/M2

## 2021-09-01 DIAGNOSIS — I10 ESSENTIAL HYPERTENSION: ICD-10-CM

## 2021-09-01 DIAGNOSIS — I48.0 PAROXYSMAL ATRIAL FIBRILLATION (HCC): Primary | ICD-10-CM

## 2021-09-01 PROCEDURE — 93000 ELECTROCARDIOGRAM COMPLETE: CPT | Performed by: PHYSICIAN ASSISTANT

## 2021-09-01 PROCEDURE — 99214 OFFICE O/P EST MOD 30 MIN: CPT | Performed by: PHYSICIAN ASSISTANT

## 2021-09-01 RX ORDER — AZELASTINE HCL 205.5 UG/1
SPRAY NASAL
COMMUNITY
Start: 2021-07-24 | End: 2021-09-17 | Stop reason: SDUPTHER

## 2021-09-01 NOTE — PROGRESS NOTES
Jason NATASHA Domingo  1955  [phone]    02/24/2021    [facility] CARDIOLOGY     Referring Provider: No ref. provider found     Anali Weinberg PA-C  [address]  [place] [phone]    Chief Complaint   Patient presents with   • Paroxysmal atrial fibrillation (CMS/[facility])       Problem List:   1. Atrial fibrillation:  a. Rapid ventricular response of unknown duration, hospitalized on 01/01/2012.   b. Pradaxa and sotalol initiated, 01/02/2012.   c. Echocardiogram, January 2012 with mild mitral regurgitation, ejection fraction 35% to 40%.   d. Echocardiogram, May 2012: Left ventricular ejection fraction of 45% to 50%, mild TR.   e. Echocardiogram, 12/15/2014: EF 50% to 55%. Mild-to-moderate TR.   f. Chadsvasc=1, ASA  2. Alcohol abuse.   3. Tobacco abuse.   4. Hypertension.   5. Acute systolic congestive heart failure secondary to atrial fibrillation with rapid ventricular response.   6. Syncope:  a. Left and right heart catheterization, Dr. Larson, 03/15/2013, with normal pressures and normal coronary arteries: EF 55%       Allergies  No Known Allergies    Current Medications    Current Outpatient Medications:   •  aspirin (aspirin) 81 MG EC tablet, Take 2 tablets by mouth Daily., Disp: 180 tablet, Rfl: 3  •  azelastine (ASTEPRO) 0.15 % solution nasal spray, , Disp: , Rfl:   •  dorzolamide (TRUSOPT) 2 % ophthalmic solution, Administer  to both eyes 3 (Three) Times a Day., Disp: , Rfl:   •  fluticasone (FLONASE) 50 MCG/ACT nasal spray, 2 sprays into the nostril(s) as directed by provider Daily., Disp: 9.9 mL, Rfl: 1  •  ketoconazole (NIZORAL) 2 % shampoo, As Needed., Disp: , Rfl: 0  •  latanoprost (XALATAN) 0.005 % ophthalmic solution, Administer  to both eyes Daily., Disp: , Rfl:   •  lisinopril (PRINIVIL,ZESTRIL) 40 MG tablet, TAKE 1 TABLET TWICE DAILY, Disp: 180 tablet, Rfl: 3  •  sotalol (BETAPACE) 80 MG tablet, TAKE 1 TABLET TWICE DAILY, Disp: 180 tablet, Rfl: 3  •  timolol  "(TIMOPTIC) 0.5 % ophthalmic solution, Administer  to both eyes Daily., Disp: , Rfl:     History of Present Illness     Pt presents for follow up of atrial fibrillation, HTN. Since we last saw the pt, pt denies any AF episodes, SOB, CP, LH, or dizziness.  Denies any hospitalizations, ER visits, bleeding ASA, or TIA/CVA symptoms. Overall feels well.  BP well controlled at home.     ROS:    General:  Denies fatigue, weight gain or loss  Cardiovascular:  Denies CP, PND, syncope, near syncope, edema or palpitations.  Pulmonary:  Denies JONES, cough, or wheezing      Vitals:    09/01/21 1108   BP: 158/72   BP Location: Right arm   Patient Position: Sitting   Pulse: 52   SpO2: 97%   Weight: 86.6 kg (191 lb)   Height: 180.3 cm (71\")     Body mass index is 26.64 kg/m².  PE:  General: NAD  Neck: no JVD, no carotid bruits, no TM  Heart RRR, NL S1, S2, no rubs, murmurs  Lungs: CTA, no wheezes, rhonchi, or rales  Abd: soft, non-tender, NL BS  Ext: No musculoskeletal deformities, no edema, cyanosis, or clubbing  Psych: normal mood and affect    Diagnostic Data:        ECG 12 Lead    Date/Time: 9/1/2021 11:15 AM  Performed by: Hernan Fernandez PA  Authorized by: Hernan Fernandez PA   Rhythm: sinus rhythm  Rate: normal  Conduction: conduction normal  ST Segments: ST segments normal  T Waves: T waves normal  QRS axis: normal    Clinical impression: normal ECG            1. Paroxysmal atrial fibrillation (CMS/HCC)    2. Essential hypertension          Plan:    1) Paroxysmal atrial fibrillation:  - Maintaining sinus rhythm on Sotalol.  QTc acceptable.  - Continue present medications.     2) Anticoagulation:  - CHADSVASc = 1, on ASA    3) HTN:BP elevated today, Patient reports BP 130mmHg systolic on average at home. We asked him to continue to check at home.  -   4)Asymptomatic Bradycardia  -   Wt loss, exercise, salt reduction     F/up in 8 months with Dr. Goff     Electronically signed by WILLIAM Salter, 09/01/21, " 11:19 AM EDT.

## 2021-09-17 ENCOUNTER — HOSPITAL ENCOUNTER (OUTPATIENT)
Dept: GENERAL RADIOLOGY | Facility: HOSPITAL | Age: 66
Discharge: HOME | End: 2021-09-17

## 2021-09-17 ENCOUNTER — LAB (OUTPATIENT)
Dept: LAB | Facility: HOSPITAL | Age: 66
End: 2021-09-17

## 2021-09-17 ENCOUNTER — OFFICE VISIT (OUTPATIENT)
Dept: FAMILY MEDICINE CLINIC | Facility: CLINIC | Age: 66
End: 2021-09-17

## 2021-09-17 VITALS
SYSTOLIC BLOOD PRESSURE: 126 MMHG | BODY MASS INDEX: 26.6 KG/M2 | DIASTOLIC BLOOD PRESSURE: 70 MMHG | TEMPERATURE: 97.2 F | HEART RATE: 62 BPM | WEIGHT: 190 LBS | HEIGHT: 71 IN | OXYGEN SATURATION: 98 %

## 2021-09-17 DIAGNOSIS — J30.9 ALLERGIC RHINITIS, UNSPECIFIED SEASONALITY, UNSPECIFIED TRIGGER: ICD-10-CM

## 2021-09-17 DIAGNOSIS — R06.09 DYSPNEA ON EXERTION: ICD-10-CM

## 2021-09-17 DIAGNOSIS — I48.0 PAROXYSMAL ATRIAL FIBRILLATION (HCC): ICD-10-CM

## 2021-09-17 DIAGNOSIS — I50.22 CHRONIC SYSTOLIC (CONGESTIVE) HEART FAILURE (HCC): ICD-10-CM

## 2021-09-17 DIAGNOSIS — Z00.00 MEDICARE ANNUAL WELLNESS VISIT, SUBSEQUENT: Primary | ICD-10-CM

## 2021-09-17 PROCEDURE — 96160 PT-FOCUSED HLTH RISK ASSMT: CPT | Performed by: PHYSICIAN ASSISTANT

## 2021-09-17 PROCEDURE — 1159F MED LIST DOCD IN RCRD: CPT | Performed by: PHYSICIAN ASSISTANT

## 2021-09-17 PROCEDURE — 71046 X-RAY EXAM CHEST 2 VIEWS: CPT

## 2021-09-17 PROCEDURE — G0439 PPPS, SUBSEQ VISIT: HCPCS | Performed by: PHYSICIAN ASSISTANT

## 2021-09-17 PROCEDURE — 1125F AMNT PAIN NOTED PAIN PRSNT: CPT | Performed by: PHYSICIAN ASSISTANT

## 2021-09-17 PROCEDURE — 1170F FXNL STATUS ASSESSED: CPT | Performed by: PHYSICIAN ASSISTANT

## 2021-09-17 RX ORDER — AZELASTINE HCL 205.5 UG/1
1 SPRAY NASAL 2 TIMES DAILY
Qty: 3 EACH | Refills: 4 | Status: SHIPPED | OUTPATIENT
Start: 2021-09-17 | End: 2022-10-12

## 2021-09-17 RX ORDER — FLUTICASONE PROPIONATE 50 MCG
2 SPRAY, SUSPENSION (ML) NASAL DAILY
Qty: 16 G | Refills: 4 | Status: SHIPPED | OUTPATIENT
Start: 2021-09-17 | End: 2021-12-14

## 2021-09-17 NOTE — PROGRESS NOTES
The ABCs of the Annual Wellness Visit  Initial Medicare Wellness Visit    Chief Complaint   Patient presents with   • Medicare Wellness-subsequent     Sub. medicare Wellness Exam      Subjective   History of Present Illness:  Jason Lane is a 65 y.o. male who presents for an Initial Medicare Wellness Visit.    The following portions of the patient's history were reviewed and   updated as appropriate: allergies, current medications, past family history, past medical history, past surgical history and problem list.    Compared to one year ago, the patient feels his physical   health is the same.    Compared to one year ago, the patient feels his mental   health is the same.    Recent Hospitalizations:  He was not admitted to the hospital during the last year.       Current Medical Providers:  Patient Care Team:  Anali Weinberg PA-C as PCP - General (Family Medicine)    Outpatient Medications Prior to Visit   Medication Sig Dispense Refill   • aspirin (aspirin) 81 MG EC tablet Take 2 tablets by mouth Daily. 180 tablet 3   • dorzolamide (TRUSOPT) 2 % ophthalmic solution Administer  to both eyes 3 (Three) Times a Day.     • ketoconazole (NIZORAL) 2 % shampoo As Needed.  0   • latanoprost (XALATAN) 0.005 % ophthalmic solution Administer  to both eyes Daily.     • lisinopril (PRINIVIL,ZESTRIL) 40 MG tablet TAKE 1 TABLET TWICE DAILY 180 tablet 3   • sotalol (BETAPACE) 80 MG tablet TAKE 1 TABLET TWICE DAILY 180 tablet 3   • timolol (TIMOPTIC) 0.5 % ophthalmic solution Administer  to both eyes Daily.     • azelastine (ASTEPRO) 0.15 % solution nasal spray      • fluticasone (FLONASE) 50 MCG/ACT nasal spray 2 sprays into the nostril(s) as directed by provider Daily. 9.9 mL 1     No facility-administered medications prior to visit.       No opioid medication identified on active medication list. I have reviewed chart for other potential  high risk medication/s and harmful drug interactions in the  "elderly.          Aspirin is on active medication list. Aspirin use is indicated based on review of current medical condition/s. Pros and cons of this therapy have been discussed today. Benefits of this medication outweigh potential harm.  Patient has been encouraged to continue taking this medication.  .      Patient Active Problem List   Diagnosis   • Anemia   • Atrial fibrillation (CMS/HCC)   • Benign prostatic hyperplasia   • Constipation   • Reduced libido   • Hypertension   • Shoulder pain   • Seasonal allergic rhinitis   • Screening PSA (prostate specific antigen)   • Eczema   • Alcohol abuse   • Tobacco abuse   • Acute systolic congestive heart failure (CMS/HCC)   • Syncope   • PAF (paroxysmal atrial fibrillation) (CMS/HCC)   • Congestive heart failure (CMS/HCC)   • Skin lesions   • Chronic low back pain without sciatica     Advance Care Planning   has NO advanced directive - not interested in additional information    Review of Systems   Constitutional: Negative for appetite change, diaphoresis and unexpected weight change.   HENT: Negative for postnasal drip and sinus pressure.    Respiratory: Positive for shortness of breath ( with exertion over the past several months). Negative for cough, choking, chest tightness and wheezing.    Cardiovascular: Negative for chest pain, palpitations and leg swelling.   Neurological: Negative.          Objective       Vitals:    09/17/21 1006   BP: 126/70   Pulse: 62   Temp: 97.2 °F (36.2 °C)   SpO2: 98%   Weight: 86.2 kg (190 lb)   Height: 180.3 cm (71\")   PainSc:   2     BMI Readings from Last 1 Encounters:   09/17/21 26.50 kg/m²   BMI is above normal parameters. Recommendations include: exercise counseling    Does the patient have evidence of cognitive impairment? No    Physical Exam  Vitals and nursing note reviewed.   Constitutional:       General: He is not in acute distress.     Appearance: Normal appearance. He is well-developed. He is not ill-appearing, " toxic-appearing or diaphoretic.   HENT:      Head: Normocephalic and atraumatic.   Neck:      Vascular: No JVD.   Cardiovascular:      Rate and Rhythm: Normal rate and regular rhythm.      Heart sounds: Normal heart sounds. No murmur heard.     Pulmonary:      Effort: Pulmonary effort is normal. No respiratory distress.      Breath sounds: Normal breath sounds.   Chest:      Chest wall: No tenderness.   Abdominal:      General: There is no distension.      Palpations: Abdomen is soft.      Tenderness: There is no abdominal tenderness.   Musculoskeletal:         General: No swelling.   Skin:     General: Skin is warm and dry.      Coloration: Skin is not pale.      Findings: No erythema.   Neurological:      Mental Status: He is alert.   Psychiatric:         Mood and Affect: Mood normal.         Behavior: Behavior normal.               HEALTH RISK ASSESSMENT    Smoking Status:  Social History     Tobacco Use   Smoking Status Former Smoker   • Types: Cigarettes   • Quit date: 2012   • Years since quittin.7   Smokeless Tobacco Never Used     Alcohol Consumption:  Social History     Substance and Sexual Activity   Alcohol Use Yes   • Alcohol/week: 1.0 - 6.0 standard drinks   • Types: 1 - 6 Cans of beer per week    Comment: daily     Fall Risk Screen:    [facility] Fall Risk Assessment has not been completed.    Depression Screen:   PHQ-2/PHQ-9 Depression Screening 2021   Little interest or pleasure in doing things 0   Feeling down, depressed, or hopeless 0   Total Score 0       Health Habits and Functional and Cognitive Screening:  Functional & Cognitive Status 2021   Do you have difficulty preparing food and eating? No   Do you have difficulty bathing yourself, getting dressed or grooming yourself? No   Do you have difficulty using the toilet? No   Do you have difficulty moving around from place to place? No   Do you have trouble with steps or getting out of a bed or a chair? No   Current Diet Well  Balanced Diet   Dental Exam Up to date   Eye Exam Up to date   Exercise (times per week) 0 times per week   Current Exercises Include No Regular Exercise   Do you need help using the phone?  No   Are you deaf or do you have serious difficulty hearing?  No   Do you need help with transportation? No   Do you need help shopping? No   Do you need help preparing meals?  No   Do you need help with housework?  No   Do you need help with laundry? No   Do you need help taking your medications? No   Do you need help managing money? No   Do you ever drive or ride in a car without wearing a seat belt? No   Have you felt unusual stress, anger or loneliness in the last month? No   Who do you live with? Spouse   If you need help, do you have trouble finding someone available to you? No   Have you been bothered in the last four weeks by sexual problems? No   Do you have difficulty concentrating, remembering or making decisions? No       Age-appropriate Screening Schedule:  Refer to the list below for future screening recommendations based on patient's age, sex and/or medical conditions. Orders for these recommended tests are listed in the plan section. The patient has been provided with a written plan.    Health Maintenance   Topic Date Due   • TDAP/TD VACCINES (1 - Tdap) 09/17/2021 (Originally 11/8/1974)   • ZOSTER VACCINE (1 of 2) 09/17/2021 (Originally 11/8/2007)   • INFLUENZA VACCINE  10/01/2021            Assessment/Plan     CMS Preventative Services Quick Reference  Risk Factors Identified During Encounter  Cardiovascular Disease  The above risks/problems have been discussed with the patient.  Follow up actions/plans if indicated are seen below in the Assessment/Plan Section.  Pertinent information has been shared with the patient in the After Visit Summary.    Diagnoses and all orders for this visit:    1. Medicare annual wellness visit, subsequent (Primary)    2. Dyspnea on exertion  -     CBC (No Diff); Future  -     TSH;  Future  -     Comprehensive metabolic panel; Future  -     XR Chest PA & Lateral; Future    3. Paroxysmal atrial fibrillation (CMS/HCC)  -     CBC (No Diff); Future  -     Comprehensive metabolic panel; Future  -     Lipid Panel; Future    4. Allergic rhinitis, unspecified seasonality, unspecified trigger  -     fluticasone (Flonase) 50 MCG/ACT nasal spray; 2 sprays into the nostril(s) as directed by provider Daily.  Dispense: 16 g; Refill: 4    5. Chronic systolic (congestive) heart failure (CMS/HCC)  -     Lipid Panel; Future    Other orders  -     azelastine (ASTEPRO) 0.15 % solution nasal spray; 1 spray into the nostril(s) as directed by provider 2 (Two) Times a Day.  Dispense: 3 each; Refill: 4        Follow Up:  Return in about 1 year (around 9/17/2022), or if symptoms worsen or fail to improve.     An After Visit Summary and PPPS were given to the patient.           I will send a letter to patient with my detailed interpretation of patient's labs after I myself have received and reviewed the above ordered labs.      + Discussed with patient that if labs and x-ray nondiagnostic to explain symptoms of dyspnea on exertion would recommend patient follow back up with his cardiologist for further evaluation and treatment.

## 2021-09-18 LAB
ALBUMIN SERPL-MCNC: 4.8 G/DL (ref 3.5–5.2)
ALBUMIN/GLOB SERPL: 2 G/DL
ALP SERPL-CCNC: 70 U/L (ref 39–117)
ALT SERPL-CCNC: 18 U/L (ref 1–41)
AST SERPL-CCNC: 22 U/L (ref 1–40)
BILIRUB SERPL-MCNC: 0.6 MG/DL (ref 0–1.2)
BUN SERPL-MCNC: 13 MG/DL (ref 8–23)
BUN/CREAT SERPL: 12.5 (ref 7–25)
CALCIUM SERPL-MCNC: 9.6 MG/DL (ref 8.6–10.5)
CHLORIDE SERPL-SCNC: 95 MMOL/L (ref 98–107)
CHOLEST SERPL-MCNC: 189 MG/DL (ref 100–199)
CHOLEST SERPL-MCNC: 198 MG/DL (ref 0–200)
CO2 SERPL-SCNC: 24.3 MMOL/L (ref 22–29)
CREAT SERPL-MCNC: 1.04 MG/DL (ref 0.76–1.27)
ERYTHROCYTE [DISTWIDTH] IN BLOOD BY AUTOMATED COUNT: 13 % (ref 12.3–15.4)
GLOBULIN SER CALC-MCNC: 2.4 GM/DL
GLUCOSE SERPL-MCNC: 104 MG/DL (ref 65–99)
HCT VFR BLD AUTO: 33.3 % (ref 37.5–51)
HDLC SERPL-MCNC: 57 MG/DL
HDLC SERPL-MCNC: 58 MG/DL (ref 40–60)
HGB BLD-MCNC: 11.2 G/DL (ref 13–17.7)
LDLC SERPL CALC-MCNC: 116 MG/DL (ref 0–99)
LDLC SERPL CALC-MCNC: 124 MG/DL (ref 0–100)
MCH RBC QN AUTO: 31.9 PG (ref 26.6–33)
MCHC RBC AUTO-ENTMCNC: 33.6 G/DL (ref 31.5–35.7)
MCV RBC AUTO: 94.9 FL (ref 79–97)
PLATELET # BLD AUTO: 301 10*3/MM3 (ref 140–450)
POTASSIUM SERPL-SCNC: 5.4 MMOL/L (ref 3.5–5.2)
PROT SERPL-MCNC: 7.2 G/DL (ref 6–8.5)
RBC # BLD AUTO: 3.51 10*6/MM3 (ref 4.14–5.8)
SODIUM SERPL-SCNC: 131 MMOL/L (ref 136–145)
TRIGL SERPL-MCNC: 86 MG/DL (ref 0–150)
TRIGL SERPL-MCNC: 88 MG/DL (ref 0–149)
TSH SERPL DL<=0.005 MIU/L-ACNC: 0.77 UIU/ML (ref 0.27–4.2)
VLDLC SERPL CALC-MCNC: 16 MG/DL (ref 5–40)
VLDLC SERPL CALC-MCNC: 16 MG/DL (ref 5–40)
WBC # BLD AUTO: 7.6 10*3/MM3 (ref 3.4–10.8)

## 2021-09-20 DIAGNOSIS — D64.9 ANEMIA, UNSPECIFIED TYPE: Primary | ICD-10-CM

## 2021-09-20 DIAGNOSIS — E87.1 HYPONATREMIA: Primary | ICD-10-CM

## 2021-12-10 DIAGNOSIS — J30.9 ALLERGIC RHINITIS, UNSPECIFIED SEASONALITY, UNSPECIFIED TRIGGER: ICD-10-CM

## 2021-12-14 RX ORDER — FLUTICASONE PROPIONATE 50 MCG
SPRAY, SUSPENSION (ML) NASAL
Qty: 48 G | Refills: 3 | Status: SHIPPED | OUTPATIENT
Start: 2021-12-14 | End: 2022-10-04

## 2022-01-13 ENCOUNTER — OFFICE VISIT (OUTPATIENT)
Dept: FAMILY MEDICINE CLINIC | Facility: CLINIC | Age: 67
End: 2022-01-13

## 2022-01-13 VITALS
TEMPERATURE: 97.7 F | WEIGHT: 192 LBS | DIASTOLIC BLOOD PRESSURE: 80 MMHG | HEIGHT: 71 IN | BODY MASS INDEX: 26.88 KG/M2 | SYSTOLIC BLOOD PRESSURE: 130 MMHG | HEART RATE: 62 BPM | RESPIRATION RATE: 18 BRPM | OXYGEN SATURATION: 96 %

## 2022-01-13 DIAGNOSIS — H61.23 BILATERAL HEARING LOSS DUE TO CERUMEN IMPACTION: Primary | ICD-10-CM

## 2022-01-13 PROCEDURE — 99213 OFFICE O/P EST LOW 20 MIN: CPT | Performed by: PHYSICIAN ASSISTANT

## 2022-01-13 NOTE — PROGRESS NOTES
[facility]  [address]  [facility] [address]  Dept: [phone]  Dept Fax: [phone]  Loc: [phone]  Loc Fax: [phone]    Jason Lane  1955    Follow Up Office Visit Note    History of Present Illness:  Patient is a 66-year-old male in today for hearing loss.  Patient has a history and is prone to cerumen impaction.  Patient feels as if this is what is going on again.  Denies any other symptoms.      The following portions of the patient's history were reviewed and updated as appropriate: allergies, current medications, past family history, past medical history, past social history, past surgical history, and problem list.    Medications:    Current Outpatient Medications:   •  aspirin (aspirin) 81 MG EC tablet, Take 2 tablets by mouth Daily., Disp: 180 tablet, Rfl: 3  •  azelastine (ASTEPRO) 0.15 % solution nasal spray, 1 spray into the nostril(s) as directed by provider 2 (Two) Times a Day., Disp: 3 each, Rfl: 4  •  dorzolamide (TRUSOPT) 2 % ophthalmic solution, Administer  to both eyes 3 (Three) Times a Day., Disp: , Rfl:   •  fluticasone (FLONASE) 50 MCG/ACT nasal spray, USE 2 SPRAYS NASALLY EVERY DAY AS DIRECTED BY PROVIDER, Disp: 48 g, Rfl: 3  •  lisinopril (PRINIVIL,ZESTRIL) 40 MG tablet, TAKE 1 TABLET TWICE DAILY, Disp: 180 tablet, Rfl: 3  •  sotalol (BETAPACE) 80 MG tablet, TAKE 1 TABLET TWICE DAILY, Disp: 180 tablet, Rfl: 3  •  timolol (TIMOPTIC) 0.5 % ophthalmic solution, Administer  to both eyes Daily., Disp: , Rfl:     Subjective  No Known Allergies     Past Medical History:   Diagnosis Date   • Acute systolic congestive heart failure (HCC)     Acute systolic congestive heart failure secondary to atrial fibrillation with rapid ventricular response.   • Alcohol abuse    • Atrial fibrillation (HCC)    • Congestive heart failure (HCC)     History of congestive heart failure with normalized ejection fraction of  50% to 55%, December 2014. Continue lisinopril and beta blocker in the form of sotalol.  He has current class I symptoms   • Hearing loss    • Hypertension    • PAF (paroxysmal atrial fibrillation) (HCC)    • Syncope     Left and right heart catheterization, Dr. Larson, 03/15/2013, with normal pressures and normal coronary arteries: EF 55%.   • Tobacco abuse        Past Surgical History:   Procedure Laterality Date   • WRIST SURGERY      2015       Family History   Problem Relation Age of Onset   • Anemia Other         cousins   • Heart disease Mother    • Thyroid disease Mother    • Hyperlipidemia Mother    • Kidney disease Mother    • Heart disease Maternal Uncle    • Heart disease Paternal Uncle         Social History     Socioeconomic History   • Marital status:    Tobacco Use   • Smoking status: Former Smoker     Types: Cigarettes     Quit date: 1/1/2012     Years since quitting: 10.0   • Smokeless tobacco: Never Used   Vaping Use   • Vaping Use: Never used   Substance and Sexual Activity   • Alcohol use: Yes     Alcohol/week: 1.0 - 6.0 standard drink     Types: 1 - 6 Cans of beer per week     Comment: daily   • Drug use: No   • Sexual activity: Defer       Review of Systems   Constitutional: Negative for activity change, chills, fatigue, fever and unexpected weight change.   HENT: Positive for hearing loss. Negative for congestion, ear pain, postnasal drip, sinus pressure and sore throat.    Eyes: Negative for pain, discharge and redness.   Respiratory: Negative for cough, shortness of breath and wheezing.    Cardiovascular: Negative for chest pain, palpitations and leg swelling.   Gastrointestinal: Negative for diarrhea, nausea and vomiting.   Endocrine: Negative for cold intolerance and heat intolerance.   Genitourinary: Negative for decreased urine volume and dysuria.   Musculoskeletal: Negative for arthralgias and myalgias.   Skin: Negative for rash and wound.   Neurological: Negative for dizziness,  "light-headedness and headaches.   Hematological: Does not bruise/bleed easily.   Psychiatric/Behavioral: Negative for confusion, dysphoric mood and sleep disturbance. The patient is not nervous/anxious.          Objective  Vitals:    01/13/22 1003   BP: 130/80   Pulse: 62   Resp: 18   Temp: 97.7 °F (36.5 °C)   TempSrc: Infrared   SpO2: 96%   Weight: 87.1 kg (192 lb)   Height: 180.3 cm (71\")     Body mass index is 26.78 kg/m².     Physical Exam  Physical Exam  Vitals and nursing note reviewed.   Constitutional:       General: He is not in acute distress.     Appearance: He is not ill-appearing.   HENT:      Head: Normocephalic.      Right Ear: Tympanic membrane, ear canal and external ear normal. There is impacted cerumen.      Left Ear: Tympanic membrane, ear canal and external ear normal. There is impacted cerumen.      Nose: No congestion or rhinorrhea.      Mouth/Throat:      Mouth: Mucous membranes are moist.      Pharynx: Oropharynx is clear. No oropharyngeal exudate or posterior oropharyngeal erythema.   Eyes:      General:         Right eye: No discharge.         Left eye: No discharge.      Extraocular Movements: Extraocular movements intact.      Conjunctiva/sclera: Conjunctivae normal.      Pupils: Pupils are equal, round, and reactive to light.   Cardiovascular:      Rate and Rhythm: Normal rate and regular rhythm.      Heart sounds: Normal heart sounds. No murmur heard.  No friction rub. No gallop.    Pulmonary:      Effort: Pulmonary effort is normal. No respiratory distress.      Breath sounds: Normal breath sounds. No wheezing.   Abdominal:      General: Bowel sounds are normal. There is no distension.      Palpations: Abdomen is soft. There is no mass.      Tenderness: There is no abdominal tenderness.   Musculoskeletal:         General: No swelling. Normal range of motion.      Cervical back: Normal range of motion. No tenderness.      Right lower leg: No edema.      Left lower leg: No edema. "   Lymphadenopathy:      Cervical: No cervical adenopathy.   Skin:     Findings: No bruising, erythema or rash.   Neurological:      Mental Status: He is oriented to person, place, and time.      Gait: Gait normal.   Psychiatric:         Mood and Affect: Mood normal.         Behavior: Behavior normal.         Thought Content: Thought content normal.         Judgment: Judgment normal.         Diagnostic Data  Procedures    Assessment  Diagnoses and all orders for this visit:    1. Bilateral hearing loss due to cerumen impaction (Primary)        Plan    1. Bilateral hearing loss due to cerumen impaction (Primary)- advised on use of debrox for 2 weeks, come back for ear irrigation after debrox use. Patient verbalized understanding of all instructions given and complied.         Return in about 2 weeks (around 1/27/2022).    Nehemiah Dominguez PA-C  01/13/2022

## 2022-04-27 ENCOUNTER — HOSPITAL ENCOUNTER (EMERGENCY)
Facility: HOSPITAL | Age: 67
Discharge: HOME | End: 2022-04-27
Attending: EMERGENCY MEDICINE | Admitting: EMERGENCY MEDICINE

## 2022-04-27 ENCOUNTER — APPOINTMENT (OUTPATIENT)
Dept: GENERAL RADIOLOGY | Facility: HOSPITAL | Age: 67
End: 2022-04-27

## 2022-04-27 VITALS
BODY MASS INDEX: 26.88 KG/M2 | TEMPERATURE: 98.6 F | SYSTOLIC BLOOD PRESSURE: 134 MMHG | HEART RATE: 72 BPM | RESPIRATION RATE: 12 BRPM | WEIGHT: 192 LBS | OXYGEN SATURATION: 98 % | DIASTOLIC BLOOD PRESSURE: 92 MMHG | HEIGHT: 71 IN

## 2022-04-27 DIAGNOSIS — E87.1 HYPONATREMIA: ICD-10-CM

## 2022-04-27 DIAGNOSIS — I48.0 PAROXYSMAL ATRIAL FIBRILLATION: Primary | ICD-10-CM

## 2022-04-27 LAB
ALBUMIN SERPL-MCNC: 4.8 G/DL (ref 3.5–5.2)
ALBUMIN/GLOB SERPL: 1.8 G/DL
ALP SERPL-CCNC: 59 U/L (ref 39–117)
ALT SERPL W P-5'-P-CCNC: 31 U/L (ref 1–41)
ANION GAP SERPL CALCULATED.3IONS-SCNC: 9 MMOL/L (ref 5–15)
AST SERPL-CCNC: 39 U/L (ref 1–40)
BASOPHILS # BLD AUTO: 0.06 10*3/MM3 (ref 0–0.2)
BASOPHILS NFR BLD AUTO: 0.6 % (ref 0–1.5)
BILIRUB SERPL-MCNC: 0.6 MG/DL (ref 0–1.2)
BUN SERPL-MCNC: 17 MG/DL (ref 8–23)
BUN/CREAT SERPL: 15.9 (ref 7–25)
CALCIUM SPEC-SCNC: 9.4 MG/DL (ref 8.6–10.5)
CHLORIDE SERPL-SCNC: 93 MMOL/L (ref 98–107)
CO2 SERPL-SCNC: 23 MMOL/L (ref 22–29)
CREAT SERPL-MCNC: 1.07 MG/DL (ref 0.76–1.27)
DEPRECATED RDW RBC AUTO: 41.9 FL (ref 37–54)
EGFRCR SERPLBLD CKD-EPI 2021: 77.5 ML/MIN/1.73
EOSINOPHIL # BLD AUTO: 0.12 10*3/MM3 (ref 0–0.4)
EOSINOPHIL NFR BLD AUTO: 1.3 % (ref 0.3–6.2)
ERYTHROCYTE [DISTWIDTH] IN BLOOD BY AUTOMATED COUNT: 12.6 % (ref 12.3–15.4)
GLOBULIN UR ELPH-MCNC: 2.6 GM/DL
GLUCOSE SERPL-MCNC: 117 MG/DL (ref 65–99)
HCT VFR BLD AUTO: 36.5 % (ref 37.5–51)
HGB BLD-MCNC: 12.7 G/DL (ref 13–17.7)
HOLD SPECIMEN: NORMAL
IMM GRANULOCYTES # BLD AUTO: 0.05 10*3/MM3 (ref 0–0.05)
IMM GRANULOCYTES NFR BLD AUTO: 0.5 % (ref 0–0.5)
LYMPHOCYTES # BLD AUTO: 1.65 10*3/MM3 (ref 0.7–3.1)
LYMPHOCYTES NFR BLD AUTO: 17.6 % (ref 19.6–45.3)
MAGNESIUM SERPL-MCNC: 1.9 MG/DL (ref 1.6–2.4)
MCH RBC QN AUTO: 31.8 PG (ref 26.6–33)
MCHC RBC AUTO-ENTMCNC: 34.8 G/DL (ref 31.5–35.7)
MCV RBC AUTO: 91.3 FL (ref 79–97)
MONOCYTES # BLD AUTO: 0.52 10*3/MM3 (ref 0.1–0.9)
MONOCYTES NFR BLD AUTO: 5.5 % (ref 5–12)
NEUTROPHILS NFR BLD AUTO: 6.99 10*3/MM3 (ref 1.7–7)
NEUTROPHILS NFR BLD AUTO: 74.5 % (ref 42.7–76)
NRBC BLD AUTO-RTO: 0 /100 WBC (ref 0–0.2)
NT-PROBNP SERPL-MCNC: 838.3 PG/ML (ref 0–900)
PLATELET # BLD AUTO: 259 10*3/MM3 (ref 140–450)
PMV BLD AUTO: 9.2 FL (ref 6–12)
POTASSIUM SERPL-SCNC: 5.6 MMOL/L (ref 3.5–5.2)
PROT SERPL-MCNC: 7.4 G/DL (ref 6–8.5)
QT INTERVAL: 372 MS
QTC INTERVAL: 429 MS
RBC # BLD AUTO: 4 10*6/MM3 (ref 4.14–5.8)
SODIUM SERPL-SCNC: 125 MMOL/L (ref 136–145)
TROPONIN T SERPL-MCNC: <0.01 NG/ML (ref 0–0.03)
TSH SERPL DL<=0.05 MIU/L-ACNC: 0.81 UIU/ML (ref 0.27–4.2)
WBC NRBC COR # BLD: 9.39 10*3/MM3 (ref 3.4–10.8)
WHOLE BLOOD HOLD SPECIMEN: NORMAL
WHOLE BLOOD HOLD SPECIMEN: NORMAL

## 2022-04-27 PROCEDURE — 93005 ELECTROCARDIOGRAM TRACING: CPT | Performed by: EMERGENCY MEDICINE

## 2022-04-27 PROCEDURE — 99283 EMERGENCY DEPT VISIT LOW MDM: CPT

## 2022-04-27 PROCEDURE — 83880 ASSAY OF NATRIURETIC PEPTIDE: CPT | Performed by: EMERGENCY MEDICINE

## 2022-04-27 PROCEDURE — 84484 ASSAY OF TROPONIN QUANT: CPT | Performed by: EMERGENCY MEDICINE

## 2022-04-27 PROCEDURE — 36415 COLL VENOUS BLD VENIPUNCTURE: CPT

## 2022-04-27 PROCEDURE — 80053 COMPREHEN METABOLIC PANEL: CPT | Performed by: EMERGENCY MEDICINE

## 2022-04-27 PROCEDURE — 84443 ASSAY THYROID STIM HORMONE: CPT | Performed by: EMERGENCY MEDICINE

## 2022-04-27 PROCEDURE — 71045 X-RAY EXAM CHEST 1 VIEW: CPT

## 2022-04-27 PROCEDURE — 83735 ASSAY OF MAGNESIUM: CPT | Performed by: EMERGENCY MEDICINE

## 2022-04-27 PROCEDURE — 85025 COMPLETE CBC W/AUTO DIFF WBC: CPT | Performed by: EMERGENCY MEDICINE

## 2022-04-27 RX ORDER — SODIUM CHLORIDE 0.9 % (FLUSH) 0.9 %
10 SYRINGE (ML) INJECTION AS NEEDED
Status: DISCONTINUED | OUTPATIENT
Start: 2022-04-27 | End: 2022-04-27 | Stop reason: HOSPADM

## 2022-04-27 RX ADMIN — APIXABAN 5 MG: 5 TABLET, FILM COATED ORAL at 18:50

## 2022-04-28 ENCOUNTER — PATIENT OUTREACH (OUTPATIENT)
Dept: CASE MANAGEMENT | Facility: OTHER | Age: 67
End: 2022-04-28

## 2022-04-28 NOTE — OUTREACH NOTE
"AMBULATORY CASE MANAGEMENT NOTE    Name and Relationship of Patient/Support Person: Jason Lane NATASHA - Self    Patient Outreach    Pt contacted regarding [facility] ED visit 4/27/22 with chief c/o listed as irregular heart beat.  Role of Ambulatory Nurse  explained and number provided.  States \"I'm doing alright, resting and I have appt with heart and valve.\"  He does confirm appt with heart and valve tomorrow, PCP f/u 5/4 and Cardiology, Dr. Goff, 5/11/22.  Reviewed symptoms that would warrant return to ED and encouraged to read AVS hilary the BEFAST page.  Reviewed meaning of BEFAST and v/u.  He did obtain the eliquis and states he is taking as directed. He does monitor bp, but states not on regular basis. Denies any issues with shortness of breath or edema. States he thinks he has some heart failure, but states no problems with it at present.  He is self sufficient and drives self.  [facility] 24/7 Nurse Call Center explained, however declined number.  States his daughter is RN that works at [employer].  He denies any needs and voiced his appreciation for the call.      Send Education  Questions/Answers    Flowsheet Row Most Recent Value   Annual Wellness Visit:  Patient Has Completed   Other Patient Education/Resources  24/7 [facility] Nurse Call Line  [states he daughter is RN that works in [employer]]   Advanced Directives: Not Interested At This Time      Social Work Assessment  Questions/Answers    Flowsheet Row Most Recent Value   People in Home spouse   Current Living Arrangements home   Primary Care Provided by self        [facility] updated and reviewed with the patient during this program:  Financial Resource Strain: Low Risk    • Difficulty of Paying Living Expenses: Not hard at all      Food Insecurity: No Food Insecurity   • Worried About Running Out of Food in the Last Year: Never true   • Ran Out of Food in the Last Year: Never true      Transportation Needs: No Transportation Needs   • Lack " of Transportation (Medical): No   • Lack of Transportation (Non-Medical): No      Housing Stability: Low Risk    • Unable to Pay for Housing in the Last Year: No   • Number of Places Lived in the Last Year: 1   • Unstable Housing in the Last Year: No     MIGUELANGEL SHAHID  Ambulatory Case Management    4/28/2022, 16:45 EDT

## 2022-04-29 ENCOUNTER — HOSPITAL ENCOUNTER (OUTPATIENT)
Dept: CARDIOLOGY | Facility: HOSPITAL | Age: 67
Discharge: HOME | End: 2022-04-29

## 2022-04-29 ENCOUNTER — OFFICE VISIT (OUTPATIENT)
Dept: CARDIOLOGY | Facility: HOSPITAL | Age: 67
End: 2022-04-29

## 2022-04-29 VITALS
SYSTOLIC BLOOD PRESSURE: 110 MMHG | OXYGEN SATURATION: 94 % | HEART RATE: 62 BPM | HEIGHT: 71 IN | BODY MASS INDEX: 26.54 KG/M2 | DIASTOLIC BLOOD PRESSURE: 74 MMHG | RESPIRATION RATE: 14 BRPM | WEIGHT: 189.6 LBS | TEMPERATURE: 97 F

## 2022-04-29 DIAGNOSIS — I48.0 PAROXYSMAL ATRIAL FIBRILLATION: ICD-10-CM

## 2022-04-29 DIAGNOSIS — I10 ESSENTIAL HYPERTENSION: ICD-10-CM

## 2022-04-29 DIAGNOSIS — Z79.01 CHRONIC ANTICOAGULATION: ICD-10-CM

## 2022-04-29 DIAGNOSIS — I48.0 PAROXYSMAL ATRIAL FIBRILLATION: Primary | ICD-10-CM

## 2022-04-29 LAB
QT INTERVAL: 408 MS
QTC INTERVAL: 414 MS

## 2022-04-29 PROCEDURE — 93010 ELECTROCARDIOGRAM REPORT: CPT | Performed by: INTERNAL MEDICINE

## 2022-04-29 PROCEDURE — 99214 OFFICE O/P EST MOD 30 MIN: CPT | Performed by: NURSE PRACTITIONER

## 2022-04-29 PROCEDURE — 93005 ELECTROCARDIOGRAM TRACING: CPT | Performed by: NURSE PRACTITIONER

## 2022-04-29 RX ORDER — LATANOPROST 50 UG/ML
1 SOLUTION/ DROPS OPHTHALMIC NIGHTLY
COMMUNITY

## 2022-04-29 NOTE — PROGRESS NOTES
"Chief Complaint  Atrial Fibrillation and Establish Care    Subjective    History of Present Illness {CC  Problem List  Visit  Diagnosis   Encounters  Notes  Medications  Labs  Result Review Imaging  Media :23}       History of Present Illness   66-year-old male presents the office today for ongoing evaluation of his PAF.  Patient presented to [facility] ED on 4/27/2022 was noted to be in atrial fibrillation with a controlled rate.  Patient is currently on sotalol 80 mg twice daily and is anticoagulated with Eliquis.  He denies any signs and symptoms of bleeding.  Patient reports that he does drink 10 beers per day and has done so for quite some time.  Also reports he has increased the amount of caffeine he has been drinking over the last year.  He is symptomatic with his atrial fibrillation and notes shortness of breath, fatigue, intermittent palpitations.  He does report that his heart rate jumped up to 130 last night but did not stay sustained at that for very long.  Objective     Vital Signs:   Vitals:    04/29/22 1010 04/29/22 1012 04/29/22 1013   BP: 114/78 110/70 110/74   BP Location: Right arm Left arm Left arm   Patient Position: Sitting Standing Sitting   Cuff Size: Adult Adult Adult   Pulse: 69 64 62   Resp:   14   Temp: 97 °F (36.1 °C) 97 °F (36.1 °C) 97 °F (36.1 °C)   TempSrc: Temporal Temporal Temporal   SpO2: 100%  94%   Weight:   86 kg (189 lb 9.6 oz)   Height:   180.3 cm (71\")     Body mass index is 26.44 kg/m².  Physical Exam  Vitals and nursing note reviewed.   Constitutional:       Appearance: Normal appearance.   HENT:      Head: Normocephalic.   Eyes:      Pupils: Pupils are equal, round, and reactive to light.   Cardiovascular:      Rate and Rhythm: Normal rate. Rhythm irregular.      Pulses: Normal pulses.      Heart sounds: Normal heart sounds. No murmur heard.  Pulmonary:      Effort: Pulmonary effort is normal.      Breath sounds: Normal breath sounds.   Abdominal: "      General: Bowel sounds are normal.      Palpations: Abdomen is soft.   Musculoskeletal:         General: Normal range of motion.      Cervical back: Normal range of motion.      Right lower leg: No edema.      Left lower leg: No edema.   Skin:     General: Skin is warm and dry.      Capillary Refill: Capillary refill takes less than 2 seconds.   Neurological:      Mental Status: He is alert and oriented to person, place, and time.   Psychiatric:         Mood and Affect: Mood normal.         Thought Content: Thought content normal.              Result Review  Data Reviewed:{ Labs  Result Review  Imaging  Med Tab  Media :23}   EKG today shows atrial fibrillation at 62 bpm with possible right ventricular conduction delay  /QTc 414         Assessment and Plan {CC Problem List  Visit Diagnosis  ROS  Review (Popup)  Health Maintenance  Quality  BestPractice  Medications  SmartSets  SnapShot Encounters  Media :23}   1. Paroxysmal atrial fibrillation (HCC)  Patient is rate controlled however symptomatic  - ECG 12 Lead; Future  Patient to take an extra 40 mg of sotalol today at 3 PM and then repeat that dose tomorrow at 3 PM if still in atrial fibrillation  Patient instructed to call the office on Monday if still in A. fib and will discuss further plan of care with Dr. Goff   patient is anticoagulated with Eliquis and has not missed any doses and denies any signs and symptoms of bleeding  2. Essential hypertension  Well-controlled on lisinopril    3. Chronic anticoagulation  On Eliquis and denies any signs and symptoms of bleeding  With initiation of Eliquis, patient to stop aspirin at this time    Counseled patient on daily use of alcohol and caffeine and its effects on atrial fibrillation        Follow Up {Instructions Charge Capture  Follow-up Communications :23}   Return if symptoms worsen or fail to improve.    Patient was given instructions and counseling regarding his condition or for  health maintenance advice. Please see specific information pulled into the AVS if appropriate.  Patient was instructed to call the [facility] with any questions, concerns, or worsening symptoms.

## 2022-05-03 ENCOUNTER — TELEPHONE (OUTPATIENT)
Dept: CARDIOLOGY | Facility: HOSPITAL | Age: 67
End: 2022-05-03

## 2022-05-03 NOTE — TELEPHONE ENCOUNTER
Per Dr. Goff, schedule cardioversion after 4 weeks of anticoagulation.  Patient was reinitiated on Eliquis 5 mg twice daily 4/27/2022.  Patient may take as needed metoprolol tartrate for the next day or so in hopes that he will convert without cardioversion.  Spoke with both patient and his daughter.  Patient knows to call the office with any change in heart rate.  Patient reports his heart rate currently at this moment is 119.

## 2022-05-03 NOTE — TELEPHONE ENCOUNTER
Patient's daughter notes that her father's heart rate is 120 this am. Will send in metoprolol tartrate 25 mg for patient to take at noon today (one dose) and patient will receive a follow up call at the end of the day to reassess

## 2022-05-04 ENCOUNTER — OFFICE VISIT (OUTPATIENT)
Dept: FAMILY MEDICINE CLINIC | Facility: CLINIC | Age: 67
End: 2022-05-04

## 2022-05-04 VITALS
TEMPERATURE: 97 F | HEIGHT: 71 IN | HEART RATE: 59 BPM | DIASTOLIC BLOOD PRESSURE: 72 MMHG | OXYGEN SATURATION: 99 % | SYSTOLIC BLOOD PRESSURE: 118 MMHG | WEIGHT: 189 LBS | BODY MASS INDEX: 26.46 KG/M2

## 2022-05-04 DIAGNOSIS — E87.1 HYPONATREMIA: ICD-10-CM

## 2022-05-04 DIAGNOSIS — E87.5 HYPERKALEMIA: ICD-10-CM

## 2022-05-04 DIAGNOSIS — I48.0 PAROXYSMAL ATRIAL FIBRILLATION: Primary | ICD-10-CM

## 2022-05-04 PROCEDURE — 99213 OFFICE O/P EST LOW 20 MIN: CPT | Performed by: PHYSICIAN ASSISTANT

## 2022-05-04 NOTE — PROGRESS NOTES
Subjective   Jason Lane is a 66 y.o. male  Follow-up (Follow up from ED for AFIB and Hyponatremia///)      History of Present Illness     Patient is a 66-year-old male seen today for follow-up. He was recently in the emergency room for atrial fibrillation and hyponatremia. He does have a cardiologist that he sees for longstanding chronic congestive heart failure and atrial fibrillation, but is seeing me today for follow-up. He is accompanied by his daughter.    The patient's daughter states the patient was seen in the emergency department 1 week ago because he was in atrial fibrillation. He was started on Eliquis and discharged with instructions to follow up with the heart valve center. The patient followed up a few days ago and was started on metoprolol yesterday in attempt to slow down his heart rate. His daughter notes the patient is well established with cardiology, Dr. Goff, and is scheduled for follow up with him next week. She states she was told if the patient's heart rate is not back to normal by his cardiology appointment, they will schedule for the patient to be cardioverted, although Dr. Goff wanted to have this done at least 4 weeks after the patient started taking the blood thinner. The patient's daughter also notes the patient's heart rate was in the 70s and 80s bpm during his emergency room visit, but has since been elevating as high as 120 bpm.    The patient's daughter notes the patient's potassium levels were elevated and sodium levels were low in his blood work from his emergency room visit. She notes the patient was instructed by the emergency room physician instructed the patient to drink Gatorade or Powerade, which both have high levels of potassium, and follow up with his primary care physician. She states the patient is a chronic alcohol drinker and drinks about 10 to 12 beers daily. The patient is concerned about the patient being on a blood thinner now while also drinking  alcohol daily.    The following portions of the patient's history were reviewed and updated as appropriate: allergies, current medications, past social history and problem list    Review of Systems   Constitutional: Positive for fatigue. Negative for unexpected weight change.   Respiratory: Positive for shortness of breath. Negative for cough and chest tightness.    Cardiovascular: Positive for palpitations. Negative for chest pain and leg swelling.   Gastrointestinal: Negative for nausea.   Skin: Negative for color change and rash.   Neurological: Negative for dizziness, syncope, weakness and headaches.       Objective     Vitals:    05/04/22 1059   BP: 118/72   Pulse: 59   Temp: 97 °F (36.1 °C)   SpO2: 99%       Physical Exam  Vitals and nursing note reviewed.   Constitutional:       General: He is not in acute distress.     Appearance: Normal appearance. He is well-developed. He is not ill-appearing, toxic-appearing or diaphoretic.   Neck:      Vascular: No carotid bruit or JVD.   Cardiovascular:      Rate and Rhythm: Normal rate and regular rhythm.      Pulses: Normal pulses.      Heart sounds: Normal heart sounds. No murmur heard.  Pulmonary:      Effort: Pulmonary effort is normal. No respiratory distress.      Breath sounds: Normal breath sounds.   Abdominal:      Palpations: Abdomen is soft.      Tenderness: There is no abdominal tenderness.   Skin:     General: Skin is warm and dry.      Coloration: Skin is not pale.   Neurological:      Mental Status: He is alert and oriented to person, place, and time.      Gait: Gait normal.   Psychiatric:         Mood and Affect: Mood normal.         Behavior: Behavior normal.         Assessment/Plan     Diagnoses and all orders for this visit:    1. Paroxysmal atrial fibrillation (HCC) (Primary)    2. Hyperkalemia    3. Hyponatremia    I explained to the patient and family that Eliquis with alcohol consumption is not recommended, but this is not an urgent issue from a  bleeding perspective as the patient's platelets and liver enzymes are normal. I recommend the patient decrease his alcohol consumption gradually. His low sodium and chloride levels are most likely due to the patient being a chronic heavy beer drinker. These levels would probably increase if the patient decreased his beer consumption. I do not recommend drinking Gatorade or Powerade at this time because if the patient's potassium levels are too high, it could affect his heart rhythm. The patient's chronic alcohol use can be addressed more aggressively once his heart rate and rhythm are back to normal. His potassium levels can be rechecked during his upcoming cardiology appointment, if necessary. The patient's heart rate is noted at 59 bpm in the office today.    Transcribed from ambient dictation for Anali Weinberg PA-C by IRENA FORRESTER.  05/04/22   13:56 EDT    Patient verbalized consent to the visit recording.

## 2022-05-11 ENCOUNTER — CLINICAL SUPPORT NO REQUIREMENTS (OUTPATIENT)
Dept: PREADMISSION TESTING | Facility: HOSPITAL | Age: 67
End: 2022-05-11

## 2022-05-11 ENCOUNTER — OFFICE VISIT (OUTPATIENT)
Dept: CARDIOLOGY | Facility: CLINIC | Age: 67
End: 2022-05-11

## 2022-05-11 VITALS
HEART RATE: 96 BPM | SYSTOLIC BLOOD PRESSURE: 110 MMHG | WEIGHT: 190 LBS | DIASTOLIC BLOOD PRESSURE: 72 MMHG | OXYGEN SATURATION: 96 % | HEIGHT: 71 IN | BODY MASS INDEX: 26.6 KG/M2

## 2022-05-11 DIAGNOSIS — F10.10 ALCOHOL ABUSE: ICD-10-CM

## 2022-05-11 DIAGNOSIS — I10 PRIMARY HYPERTENSION: ICD-10-CM

## 2022-05-11 DIAGNOSIS — I48.19 PERSISTENT ATRIAL FIBRILLATION: Primary | ICD-10-CM

## 2022-05-11 LAB — SARS-COV-2 RNA PNL SPEC NAA+PROBE: NOT DETECTED

## 2022-05-11 PROCEDURE — U0004 COV-19 TEST NON-CDC HGH THRU: HCPCS

## 2022-05-11 PROCEDURE — 99214 OFFICE O/P EST MOD 30 MIN: CPT | Performed by: PHYSICIAN ASSISTANT

## 2022-05-11 PROCEDURE — 93000 ELECTROCARDIOGRAM COMPLETE: CPT | Performed by: PHYSICIAN ASSISTANT

## 2022-05-11 NOTE — PROGRESS NOTES
Jason Lane  1955  [phone]    05/11/2022    [facility] CARDIOLOGY     Referring Provider: No ref. provider found     Anali Weinberg PA-C  [address] [facility] [address] [place], [place] [address]    Chief Complaint   Patient presents with   • PAF   • Congestive Heart Failure   • Hypertension       Problem List:   1. Persistent Atrial fibrillation:  a. Rapid ventricular response of unknown duration, hospitalized on 01/01/2012.   b. Pradaxa and sotalol initiated, 01/02/2012.   c. Echocardiogram, January 2012 with mild mitral regurgitation, ejection fraction 35% to 40%.   d. Echocardiogram, May 2012: Left ventricular ejection fraction of 45% to 50%, mild TR.   e. Echocardiogram, 12/15/2014: EF 50% to 55%. Mild-to-moderate TR.   f. Chadsvasc=2,  2. Alcohol abuse.   3. Tobacco abuse.   4. Hypertension.   5. Acute systolic congestive heart failure secondary to atrial fibrillation with rapid ventricular response.   6. Syncope:  a. Left and right heart catheterization, Dr. Larson, 03/15/2013, with normal pressures and normal coronary arteries: EF 55%      Allergies  No Known Allergies    Current Medications    Current Outpatient Medications:   •  apixaban (ELIQUIS) 5 MG tablet tablet, Take 1 tablet by mouth Every 12 (Twelve) Hours., Disp: 60 tablet, Rfl: 0  •  azelastine (ASTEPRO) 0.15 % solution nasal spray, 1 spray into the nostril(s) as directed by provider 2 (Two) Times a Day., Disp: 3 each, Rfl: 4  •  dorzolamide (TRUSOPT) 2 % ophthalmic solution, Administer 1 drop to both eyes 2 (Two) Times a Day., Disp: , Rfl:   •  fluticasone (FLONASE) 50 MCG/ACT nasal spray, USE 2 SPRAYS NASALLY EVERY DAY AS DIRECTED BY PROVIDER, Disp: 48 g, Rfl: 3  •  latanoprost (XALATAN) 0.005 % ophthalmic solution, Administer 1 drop to both eyes Every Night., Disp: , Rfl:   •  lisinopril (PRINIVIL,ZESTRIL) 40 MG tablet, TAKE 1 TABLET TWICE DAILY, Disp: 180 tablet, Rfl: 3  •  metoprolol tartrate (LOPRESSOR) 25 MG  "tablet, Take 1 tablet by mouth 2 (Two) Times a Day., Disp: 60 tablet, Rfl: 0  •  sotalol (BETAPACE) 80 MG tablet, TAKE 1 TABLET TWICE DAILY, Disp: 180 tablet, Rfl: 3  •  timolol (TIMOPTIC) 0.5 % ophthalmic solution, Administer 1 drop to both eyes Daily., Disp: , Rfl:     History of Present Illness:      Pt presents for follow up of atrial fibrillation. He had been doing really well on Sotalol for 10 years maintaining NSR, then 2 weeks ago, he was working in his on putting his taylor down. He noticed he was more tired than usual and found that his pulse was irregular. He went to the ED and found to be in AFIB with controlled rate, he was started on Eliquis. He has now been atrial fibrillation for two weeks and feels very drained, tired, SOB with exertion and has a cough. No swelling, CP, syncope. He has been on Eliquis for 2 weeks now. No bloody stools or black stools. No CVA like symptoms.   He does drink ETOH, but has cut back. He is drinking 6 beers per day.     ROS:  General:  +  fatigue, - weight gain or loss  Cardiovascular:  Denies CP, PND, syncope, near syncope, edema + palpitations.  Pulmonary:  +  JONES, - cough, or wheezing      Vitals:    05/11/22 1043   BP: 110/72   BP Location: Right arm   Patient Position: Sitting   Pulse: 96   SpO2: 96%   Weight: 86.2 kg (190 lb)   Height: 180.3 cm (70.98\")     Body mass index is 26.51 kg/m².  PE:  General: NAD. A & O x 3   Neck: no JVD, no carotid bruits, no TM  Heart: irr, irr, NL S1, S2, no rubs, murmurs  Lungs: CTA, no wheezes, rhonchi, or rales  Abd: soft, non-tender, NL BS  Ext: No musculoskeletal deformities, no edema, cyanosis, or clubbing  Psych: normal mood and affect    Diagnostic Data:        ECG 12 Lead    Date/Time: 5/11/2022 11:19 AM  Performed by: Deidre Ozuna PA  Authorized by: Deidre Ozuna PA   Comparison: compared with previous ECG from 4/29/2022  Similar to previous ECG  Comparison to previous ECG: Now with RVR  Rhythm: atrial " fibrillation  BPM: 110              1. Persistent atrial fibrillation (HCC)    2. Primary hypertension    3. Alcohol abuse          Plan:  1) Paroxysmal atrial fibrillation:  - historically PAF, maintaining normal sinus rhythm on sotalol 80 mg twice daily for 10 years.  Now persistent in nature for two weeks and feeling very poorly.  He has moderate to severe symptoms of dyspnea on exertion, fatigue, and palpitations.  Original plan was for weeks of anticoagulation with external cardioversion, however he does not think he can wait 2 more weeks.  We will set him up for a AMY cardioversion in the next couple of days.  He will stop his metoprolol today due to his history of bradycardia and continue on sotalol 80 mg twice daily.  If he has further recurrences of atrial fibrillation after this cardioversion, need to consider alternate antiarrhythmic.  Instructed to decrease alcohol intake.     2) Anticoagulation:  - CHADSVASc = 2 now on Eliquis   -Daughter who is an RN is concerned of him being on long-term Eliquis due to his history of alcohol use.  Could consider watchman device in the future.     3) HTN:  -Controlled    4) Asymptomatic Bradycardia  - stop Metoprolol       F/up in 6 months      Electronically signed by WILLIAM Rosas, 05/11/22, 11:00 AM EDT.

## 2022-05-11 NOTE — H&P (VIEW-ONLY)
Jason Lane  1955  [phone]    05/11/2022    [facility] CARDIOLOGY     Referring Provider: No ref. provider found     Anali Weinberg PA-C  [address] [facility] [address] [place], [place] [id number]    Chief Complaint   Patient presents with   • PAF   • Congestive Heart Failure   • Hypertension       Problem List:   1. Persistent Atrial fibrillation:  a. Rapid ventricular response of unknown duration, hospitalized on 01/01/2012.   b. Pradaxa and sotalol initiated, 01/02/2012.   c. Echocardiogram, January 2012 with mild mitral regurgitation, ejection fraction 35% to 40%.   d. Echocardiogram, May 2012: Left ventricular ejection fraction of 45% to 50%, mild TR.   e. Echocardiogram, 12/15/2014: EF 50% to 55%. Mild-to-moderate TR.   f. Chadsvasc=2,  2. Alcohol abuse.   3. Tobacco abuse.   4. Hypertension.   5. Acute systolic congestive heart failure secondary to atrial fibrillation with rapid ventricular response.   6. Syncope:  a. Left and right heart catheterization, Dr. Larson, 03/15/2013, with normal pressures and normal coronary arteries: EF 55%      Allergies  No Known Allergies    Current Medications    Current Outpatient Medications:   •  apixaban (ELIQUIS) 5 MG tablet tablet, Take 1 tablet by mouth Every 12 (Twelve) Hours., Disp: 60 tablet, Rfl: 0  •  azelastine (ASTEPRO) 0.15 % solution nasal spray, 1 spray into the nostril(s) as directed by provider 2 (Two) Times a Day., Disp: 3 each, Rfl: 4  •  dorzolamide (TRUSOPT) 2 % ophthalmic solution, Administer 1 drop to both eyes 2 (Two) Times a Day., Disp: , Rfl:   •  fluticasone (FLONASE) 50 MCG/ACT nasal spray, USE 2 SPRAYS NASALLY EVERY DAY AS DIRECTED BY PROVIDER, Disp: 48 g, Rfl: 3  •  latanoprost (XALATAN) 0.005 % ophthalmic solution, Administer 1 drop to both eyes Every Night., Disp: , Rfl:   •  lisinopril (PRINIVIL,ZESTRIL) 40 MG tablet, TAKE 1 TABLET TWICE DAILY, Disp: 180 tablet, Rfl: 3  •  metoprolol tartrate (LOPRESSOR) 25 MG  "tablet, Take 1 tablet by mouth 2 (Two) Times a Day., Disp: 60 tablet, Rfl: 0  •  sotalol (BETAPACE) 80 MG tablet, TAKE 1 TABLET TWICE DAILY, Disp: 180 tablet, Rfl: 3  •  timolol (TIMOPTIC) 0.5 % ophthalmic solution, Administer 1 drop to both eyes Daily., Disp: , Rfl:     History of Present Illness:      Pt presents for follow up of atrial fibrillation. He had been doing really well on Sotalol for 10 years maintaining NSR, then 2 weeks ago, he was working in his on putting his taylor down. He noticed he was more tired than usual and found that his pulse was irregular. He went to the ED and found to be in AFIB with controlled rate, he was started on Eliquis. He has now been atrial fibrillation for two weeks and feels very drained, tired, SOB with exertion and has a cough. No swelling, CP, syncope. He has been on Eliquis for 2 weeks now. No bloody stools or black stools. No CVA like symptoms.   He does drink ETOH, but has cut back. He is drinking 6 beers per day.     ROS:  General:  +  fatigue, - weight gain or loss  Cardiovascular:  Denies CP, PND, syncope, near syncope, edema + palpitations.  Pulmonary:  +  JONES, - cough, or wheezing      Vitals:    05/11/22 1043   BP: 110/72   BP Location: Right arm   Patient Position: Sitting   Pulse: 96   SpO2: 96%   Weight: 86.2 kg (190 lb)   Height: 180.3 cm (70.98\")     Body mass index is 26.51 kg/m².  PE:  General: NAD. A & O x 3   Neck: no JVD, no carotid bruits, no TM  Heart: irr, irr, NL S1, S2, no rubs, murmurs  Lungs: CTA, no wheezes, rhonchi, or rales  Abd: soft, non-tender, NL BS  Ext: No musculoskeletal deformities, no edema, cyanosis, or clubbing  Psych: normal mood and affect    Diagnostic Data:        ECG 12 Lead    Date/Time: 5/11/2022 11:19 AM  Performed by: Deidre Ozuna PA  Authorized by: Deidre Ozuna PA   Comparison: compared with previous ECG from 4/29/2022  Similar to previous ECG  Comparison to previous ECG: Now with RVR  Rhythm: atrial " fibrillation  BPM: 110              1. Persistent atrial fibrillation (HCC)    2. Primary hypertension    3. Alcohol abuse          Plan:  1) Paroxysmal atrial fibrillation:  - historically PAF, maintaining normal sinus rhythm on sotalol 80 mg twice daily for 10 years.  Now persistent in nature for two weeks and feeling very poorly.  He has moderate to severe symptoms of dyspnea on exertion, fatigue, and palpitations.  Original plan was for weeks of anticoagulation with external cardioversion, however he does not think he can wait 2 more weeks.  We will set him up for a AMY cardioversion in the next couple of days.  He will stop his metoprolol today due to his history of bradycardia and continue on sotalol 80 mg twice daily.  If he has further recurrences of atrial fibrillation after this cardioversion, need to consider alternate antiarrhythmic.  Instructed to decrease alcohol intake.     2) Anticoagulation:  - CHADSVASc = 2 now on Eliquis   -Daughter who is an RN is concerned of him being on long-term Eliquis due to his history of alcohol use.  Could consider watchman device in the future.     3) HTN:  -Controlled    4) Asymptomatic Bradycardia  - stop Metoprolol       F/up in 6 months      Electronically signed by WILLIAM Rosas, 05/11/22, 11:00 AM EDT.

## 2022-05-13 ENCOUNTER — HOSPITAL ENCOUNTER (OUTPATIENT)
Dept: CARDIOLOGY | Facility: HOSPITAL | Age: 67
Discharge: HOME | End: 2022-05-13
Attending: INTERNAL MEDICINE | Admitting: INTERNAL MEDICINE

## 2022-05-13 VITALS
TEMPERATURE: 97.4 F | HEART RATE: 64 BPM | DIASTOLIC BLOOD PRESSURE: 74 MMHG | HEIGHT: 71 IN | RESPIRATION RATE: 16 BRPM | BODY MASS INDEX: 26.45 KG/M2 | OXYGEN SATURATION: 96 % | SYSTOLIC BLOOD PRESSURE: 115 MMHG | WEIGHT: 188.93 LBS

## 2022-05-13 DIAGNOSIS — I48.19 PERSISTENT ATRIAL FIBRILLATION: ICD-10-CM

## 2022-05-13 LAB
ANION GAP SERPL CALCULATED.3IONS-SCNC: 8 MMOL/L (ref 5–15)
BH CV VAS BP LEFT ARM: NORMAL MMHG
BUN SERPL-MCNC: 14 MG/DL (ref 8–23)
BUN/CREAT SERPL: 11.3 (ref 7–25)
CALCIUM SPEC-SCNC: 9 MG/DL (ref 8.6–10.5)
CHLORIDE SERPL-SCNC: 98 MMOL/L (ref 98–107)
CO2 SERPL-SCNC: 23 MMOL/L (ref 22–29)
CREAT SERPL-MCNC: 1.24 MG/DL (ref 0.76–1.27)
DEPRECATED RDW RBC AUTO: 46.3 FL (ref 37–54)
EGFRCR SERPLBLD CKD-EPI 2021: 64.9 ML/MIN/1.73
ERYTHROCYTE [DISTWIDTH] IN BLOOD BY AUTOMATED COUNT: 13.1 % (ref 12.3–15.4)
GLUCOSE SERPL-MCNC: 93 MG/DL (ref 65–99)
HCT VFR BLD AUTO: 34 % (ref 37.5–51)
HGB BLD-MCNC: 11.7 G/DL (ref 13–17.7)
LEFT ATRIUM VOLUME: 126 CM3
MAXIMAL PREDICTED HEART RATE: 156 BPM
MAXIMAL PREDICTED HEART RATE: 156 BPM
MCH RBC QN AUTO: 33.1 PG (ref 26.6–33)
MCHC RBC AUTO-ENTMCNC: 34.4 G/DL (ref 31.5–35.7)
MCV RBC AUTO: 96 FL (ref 79–97)
PLATELET # BLD AUTO: 228 10*3/MM3 (ref 140–450)
PMV BLD AUTO: 10.5 FL (ref 6–12)
POTASSIUM SERPL-SCNC: 4.9 MMOL/L (ref 3.5–5.2)
QT INTERVAL: 420 MS
QTC INTERVAL: 446 MS
RBC # BLD AUTO: 3.54 10*6/MM3 (ref 4.14–5.8)
SODIUM SERPL-SCNC: 129 MMOL/L (ref 136–145)
STRESS TARGET HR: 133 BPM
STRESS TARGET HR: 133 BPM
WBC NRBC COR # BLD: 8.65 10*3/MM3 (ref 3.4–10.8)

## 2022-05-13 PROCEDURE — 93312 ECHO TRANSESOPHAGEAL: CPT | Performed by: INTERNAL MEDICINE

## 2022-05-13 PROCEDURE — 93321 DOPPLER ECHO F-UP/LMTD STD: CPT

## 2022-05-13 PROCEDURE — 85027 COMPLETE CBC AUTOMATED: CPT | Performed by: INTERNAL MEDICINE

## 2022-05-13 PROCEDURE — 93312 ECHO TRANSESOPHAGEAL: CPT

## 2022-05-13 PROCEDURE — 93321 DOPPLER ECHO F-UP/LMTD STD: CPT | Performed by: INTERNAL MEDICINE

## 2022-05-13 PROCEDURE — 80048 BASIC METABOLIC PNL TOTAL CA: CPT | Performed by: INTERNAL MEDICINE

## 2022-05-13 PROCEDURE — 92960 CARDIOVERSION ELECTRIC EXT: CPT | Performed by: INTERNAL MEDICINE

## 2022-05-13 PROCEDURE — 93005 ELECTROCARDIOGRAM TRACING: CPT | Performed by: INTERNAL MEDICINE

## 2022-05-13 PROCEDURE — 93325 DOPPLER ECHO COLOR FLOW MAPG: CPT | Performed by: INTERNAL MEDICINE

## 2022-05-13 PROCEDURE — 25010000002 MIDAZOLAM PER 1 MG: Performed by: INTERNAL MEDICINE

## 2022-05-13 PROCEDURE — 93325 DOPPLER ECHO COLOR FLOW MAPG: CPT

## 2022-05-13 PROCEDURE — 92960 CARDIOVERSION ELECTRIC EXT: CPT

## 2022-05-13 PROCEDURE — 36415 COLL VENOUS BLD VENIPUNCTURE: CPT

## 2022-05-13 RX ORDER — CARVEDILOL 6.25 MG/1
6.25 TABLET ORAL 2 TIMES DAILY
Qty: 60 TABLET | Refills: 11 | Status: SHIPPED | OUTPATIENT
Start: 2022-05-13 | End: 2022-06-01 | Stop reason: SDUPTHER

## 2022-05-13 RX ORDER — MIDAZOLAM HYDROCHLORIDE 1 MG/ML
INJECTION INTRAMUSCULAR; INTRAVENOUS
Status: DISCONTINUED
Start: 2022-05-13 | End: 2022-05-13 | Stop reason: HOSPADM

## 2022-05-13 RX ORDER — NALOXONE HYDROCHLORIDE 0.4 MG/ML
INJECTION, SOLUTION INTRAMUSCULAR; INTRAVENOUS; SUBCUTANEOUS
Status: DISCONTINUED
Start: 2022-05-13 | End: 2022-05-13 | Stop reason: WASHOUT

## 2022-05-13 RX ORDER — SODIUM CHLORIDE 0.9 % (FLUSH) 0.9 %
10 SYRINGE (ML) INJECTION EVERY 12 HOURS SCHEDULED
Status: DISCONTINUED | OUTPATIENT
Start: 2022-05-13 | End: 2022-05-13 | Stop reason: HOSPADM

## 2022-05-13 RX ORDER — MIDAZOLAM HYDROCHLORIDE 1 MG/ML
INJECTION INTRAMUSCULAR; INTRAVENOUS
Status: COMPLETED | OUTPATIENT
Start: 2022-05-13 | End: 2022-05-13

## 2022-05-13 RX ORDER — FENTANYL CITRATE 50 UG/ML
INJECTION, SOLUTION INTRAMUSCULAR; INTRAVENOUS
Status: DISCONTINUED
Start: 2022-05-13 | End: 2022-05-13 | Stop reason: WASHOUT

## 2022-05-13 RX ORDER — SODIUM CHLORIDE 0.9 % (FLUSH) 0.9 %
10 SYRINGE (ML) INJECTION AS NEEDED
Status: DISCONTINUED | OUTPATIENT
Start: 2022-05-13 | End: 2022-05-13 | Stop reason: HOSPADM

## 2022-05-13 RX ORDER — FLUMAZENIL 0.1 MG/ML
INJECTION INTRAVENOUS
Status: DISCONTINUED
Start: 2022-05-13 | End: 2022-05-13 | Stop reason: WASHOUT

## 2022-05-13 RX ORDER — AMIODARONE HYDROCHLORIDE 200 MG/1
TABLET ORAL
Qty: 90 TABLET | Refills: 6 | Status: SHIPPED | OUTPATIENT
Start: 2022-05-19 | End: 2023-05-24

## 2022-05-13 RX ORDER — SACUBITRIL AND VALSARTAN 24; 26 MG/1; MG/1
1 TABLET, FILM COATED ORAL 2 TIMES DAILY
Qty: 180 TABLET | Refills: 3 | Status: SHIPPED | OUTPATIENT
Start: 2022-05-13 | End: 2022-06-01 | Stop reason: SDUPTHER

## 2022-05-13 RX ORDER — ACETAMINOPHEN 325 MG/1
650 TABLET ORAL EVERY 4 HOURS PRN
Status: DISCONTINUED | OUTPATIENT
Start: 2022-05-13 | End: 2022-05-13 | Stop reason: HOSPADM

## 2022-05-13 RX ADMIN — Medication 20 MG: at 11:19

## 2022-05-13 RX ADMIN — Medication 20 MG: at 11:28

## 2022-05-13 RX ADMIN — MIDAZOLAM 2 MG: 1 INJECTION INTRAMUSCULAR; INTRAVENOUS at 11:19

## 2022-05-13 RX ADMIN — Medication 20 MG: at 11:22

## 2022-05-13 NOTE — PROGRESS NOTES
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    Moderate to severe left atrial enlargement  Moderate to severe mitral regurgitation    Normal left atrial appendage with no evidence of clot    Successful ECV with 200 J x 2    Plan:  Discussed with Dr. Goff  LifeVest order placed, discussed with Temitope during the representative    Discontinue sotalol  Start amiodarone in 5 days 200 twice daily for 10 days then 200 daily    Discontinue lisinopril, replace with low-dose Entresto  Start carvedilol 6.25 mg twice daily    Alcohol abstinence strongly recommended    Follow-up with Dr. Goff in 2 weeks.

## 2022-05-13 NOTE — INTERVAL H&P NOTE
H&P reviewed. The patient was examined and there are no changes to the H&P.      He has been on Eliquis 5mg BID since 4/27, therefore AMY/ECV planned for today  Also on Sotalol 80mg BID      Vitals and Labs today:  There were no vitals filed for this visit.    Lab Results   Component Value Date    WBC 9.39 04/27/2022    HGB 12.7 (L) 04/27/2022    HCT 36.5 (L) 04/27/2022    MCV 91.3 04/27/2022     04/27/2022     Lab Results   Component Value Date    GLUCOSE 117 (H) 04/27/2022    BUN 17 04/27/2022    CREATININE 1.07 04/27/2022    EGFRIFNONA 72 09/17/2021    EGFRIFAFRI 87 09/17/2021    BCR 15.9 04/27/2022    K 5.6 (H) 04/27/2022    CO2 23.0 04/27/2022    CALCIUM 9.4 04/27/2022    PROTENTOTREF 7.2 09/17/2021    ALBUMIN 4.80 04/27/2022    LABIL2 2.0 09/17/2021    AST 39 04/27/2022    ALT 31 04/27/2022     COVID19   Date Value Ref Range Status   05/11/2022 Not Detected Not Detected - Ref. Range Final       Tobi Jackson M.D., F.A.C.C.  Interventional Cardiology  05/13/22  11:50 EDT

## 2022-05-20 ENCOUNTER — TELEPHONE (OUTPATIENT)
Dept: CARDIOLOGY | Facility: CLINIC | Age: 67
End: 2022-05-20

## 2022-05-20 NOTE — TELEPHONE ENCOUNTER
Dr. Jackson did a cardioversion on this patient on 5/13/22. At discharge he wanted the patient to f/u with Dr. Goff in 2 weeks.    Will you call him with an appointment date and time? Thank you.

## 2022-06-01 ENCOUNTER — OFFICE VISIT (OUTPATIENT)
Dept: CARDIOLOGY | Facility: CLINIC | Age: 67
End: 2022-06-01

## 2022-06-01 VITALS
HEIGHT: 71 IN | WEIGHT: 189.4 LBS | OXYGEN SATURATION: 97 % | DIASTOLIC BLOOD PRESSURE: 72 MMHG | BODY MASS INDEX: 26.52 KG/M2 | HEART RATE: 57 BPM | SYSTOLIC BLOOD PRESSURE: 146 MMHG

## 2022-06-01 DIAGNOSIS — F10.10 ALCOHOL ABUSE: ICD-10-CM

## 2022-06-01 DIAGNOSIS — I48.0 PAROXYSMAL ATRIAL FIBRILLATION: ICD-10-CM

## 2022-06-01 DIAGNOSIS — I50.20 SYSTOLIC CONGESTIVE HEART FAILURE, UNSPECIFIED HF CHRONICITY: ICD-10-CM

## 2022-06-01 DIAGNOSIS — I48.0 PAF (PAROXYSMAL ATRIAL FIBRILLATION): ICD-10-CM

## 2022-06-01 DIAGNOSIS — I50.21 ACUTE SYSTOLIC CONGESTIVE HEART FAILURE: Primary | ICD-10-CM

## 2022-06-01 PROCEDURE — 99214 OFFICE O/P EST MOD 30 MIN: CPT | Performed by: PHYSICIAN ASSISTANT

## 2022-06-01 PROCEDURE — 93000 ELECTROCARDIOGRAM COMPLETE: CPT | Performed by: PHYSICIAN ASSISTANT

## 2022-06-01 RX ORDER — CARVEDILOL 6.25 MG/1
6.25 TABLET ORAL 2 TIMES DAILY
Qty: 60 TABLET | Refills: 11 | Status: SHIPPED | OUTPATIENT
Start: 2022-06-01

## 2022-06-01 RX ORDER — SACUBITRIL AND VALSARTAN 24; 26 MG/1; MG/1
1 TABLET, FILM COATED ORAL 2 TIMES DAILY
Qty: 180 TABLET | Refills: 3 | Status: SHIPPED | OUTPATIENT
Start: 2022-06-01 | End: 2022-09-28

## 2022-06-01 NOTE — PROGRESS NOTES
Jason Lane  1955  [phone]    02/24/2021    [facility] CARDIOLOGY     Referring Provider: No ref. provider found     Anali Weinberg PA-C  [address] [facility] [address]  [place] [id number]    Chief Complaint   Patient presents with   • Paroxysmal atrial fibrillation        Problem List:   1. Atrial fibrillation:  a. Rapid ventricular response of unknown duration, hospitalized on 01/01/2012.   b. Pradaxa and sotalol initiated, 01/02/2012.   c. Echocardiogram, January 2012 with mild mitral regurgitation, ejection fraction 35% to 40%.   d. Echocardiogram, May 2012: Left ventricular ejection fraction of 45% to 50%, mild TR.   e. Echocardiogram, 12/15/2014: EF 50% to 55%. Mild-to-moderate TR.  2. Dilated cardiomyopathy new onset 5/13/2022 diagnosed by AMY guided cardioversion    3. Alcohol abuse.   4. Tobacco abuse.   5. Hypertension.   6. Acute systolic congestive heart failure secondary to atrial fibrillation with rapid ventricular response.   7. Syncope:  a. Left and right heart catheterization, Dr. Larson, 03/15/2013, with normal pressures and normal coronary arteries: EF 55%       Allergies  No Known Allergies    Current Medications    Current Outpatient Medications:   •  amiodarone (PACERONE) 200 MG tablet, Take 1 tablet by mouth 2 (Two) Times a Day for 10 days, THEN 1 tablet Daily for 360 days., Disp: 90 tablet, Rfl: 6  •  apixaban (ELIQUIS) 5 MG tablet tablet, Take 1 tablet by mouth Every 12 (Twelve) Hours., Disp: 60 tablet, Rfl: 5  •  azelastine (ASTEPRO) 0.15 % solution nasal spray, 1 spray into the nostril(s) as directed by provider 2 (Two) Times a Day., Disp: 3 each, Rfl: 4  •  carvedilol (COREG) 6.25 MG tablet, Take 1 tablet by mouth 2 (Two) Times a Day., Disp: 60 tablet, Rfl: 11  •  dorzolamide (TRUSOPT) 2 % ophthalmic solution, Administer 1 drop to both eyes 2 (Two) Times a Day., Disp: , Rfl:   •  fluticasone (FLONASE) 50 MCG/ACT nasal spray, USE 2 SPRAYS NASALLY EVERY  "DAY AS DIRECTED BY PROVIDER, Disp: 48 g, Rfl: 3  •  latanoprost (XALATAN) 0.005 % ophthalmic solution, Administer 1 drop to both eyes Every Night., Disp: , Rfl:   •  sacubitril-valsartan (Entresto) 24-26 MG tablet, Take 1 tablet by mouth 2 (Two) Times a Day., Disp: 180 tablet, Rfl: 3  •  timolol (TIMOPTIC) 0.5 % ophthalmic solution, Administer 1 drop to both eyes Daily., Disp: , Rfl:     History of Present Illness     Pt presents for follow up of atrial fibrillation, HTN, and new nonischemic cardiomyopathy.  The patient went into atrial fibrillation on April he was admitted to the ER and subsequently had a follow-up visit with Felicitas Solis in the A. fib/heart failure clinic.  He was felt to be more persistent at this time he had more progressive shortness of breath than usual.  He underwent a AMY guided cardioversion by Dr. Jackson on 5/13/2022.  The AMY did reveal severe dilated cardiomyopathy EF 20%.  There is no mass or left atrial appendage thrombus therefore he had accessible cardioversion sinus rhythm.  His sotalol was discontinued and he was placed on amiodarone therapy.  Since his cardioversion he overall is feeling much better as far as his energy and he is less short of breath.  He is having no dizziness near syncope syncope.  He is currently wearing a LifeVest.  He has had no shocks.  He is tolerating new medications including amiodarone and Eliquis and Entresto.  He unfortunately continues to drink alcohol he was drinking 12 beers a day he states now he is down to 5 or 6 a day.        Vitals:    06/01/22 1353   BP: 146/72   BP Location: Right arm   Patient Position: Sitting   Pulse: 57   SpO2: 97%   Weight: 85.9 kg (189 lb 6.4 oz)   Height: 180.3 cm (71\")     Body mass index is 26.42 kg/m².  PE:  General: NAD  Neck: no JVD, no carotid bruits, no TM  Heart RRR, NL S1, S2, no rubs, murmurs  Lungs: CTA, no wheezes, rhonchi, or rales  Abd: soft, non-tender, NL BS  Ext: No musculoskeletal deformities, no " edema, cyanosis, or clubbing  Psych: normal mood and affect    Diagnostic Data:        ECG 12 Lead    Date/Time: 6/1/2022 2:28 PM  Performed by: Hernan Fernandez PA  Authorized by: Hernan Fernandez PA   Rhythm: sinus rhythm and sinus bradycardia  Rate: bradycardic  Conduction: conduction normal  ST Segments: ST segments normal  QRS axis: normal    Clinical impression: normal ECG            1. Acute systolic congestive heart failure (HCC)    2. Alcohol abuse    3. Paroxysmal atrial fibrillation (HCC)    4. Systolic congestive heart failure, unspecified HF chronicity (HCC)    5. PAF (paroxysmal atrial fibrillation) (HCC)          Plan:    1) Paroxysmal atrial fibrillation:  - S/p AMY guided ECV 5/13/2022-sotalol discontinued and initiation of amiodarone therapy.  He is taking daily.  We will do surveillance labs CMP and thyroid function panel in 3 months.  Previous labs stable April 2022.  - Continue present medications.     2) Anticoagulation: Elliquis 5mg BID he is tolerating well has not missed any doses no bleeding issues.      3)New onset DCM, most likely alcohol induced by AMY 5/13/2022  EF o 20%.  Patient clinically is doing better less shortness of breath is now on carvedilol, Entresto for guideline directed medical therapy.  We will pursue follow-up echocardiogram August 13.  He is currently wearing a LifeVest.-     4) alcohol use, heavy use in the past up to 1215 beers a day.  Currently drinking 6 beers per day.  Discussed patient needs to immediately stop drinking or drastically reduced amount.    Continue guideline directed medical therapy, discussed with patient family will review echocardiogram in 3 months if EF improve to above 35% may stop LifeVest.  In addition had anna discussion with patient and family patient he needs to stop drinking as most likely cardiomyopathy due to alcohol use.    Electronically signed by WILLIAM Salter, 09/01/21, 11:19 AM EDT.

## 2022-06-23 ENCOUNTER — OFFICE VISIT (OUTPATIENT)
Dept: FAMILY MEDICINE CLINIC | Facility: CLINIC | Age: 67
End: 2022-06-23

## 2022-06-23 VITALS
BODY MASS INDEX: 26.6 KG/M2 | DIASTOLIC BLOOD PRESSURE: 60 MMHG | WEIGHT: 190 LBS | HEART RATE: 72 BPM | OXYGEN SATURATION: 99 % | HEIGHT: 71 IN | SYSTOLIC BLOOD PRESSURE: 126 MMHG | TEMPERATURE: 97.2 F

## 2022-06-23 DIAGNOSIS — M77.11 LATERAL EPICONDYLITIS OF RIGHT ELBOW: Primary | ICD-10-CM

## 2022-06-23 PROCEDURE — 99212 OFFICE O/P EST SF 10 MIN: CPT | Performed by: PHYSICIAN ASSISTANT

## 2022-06-23 PROCEDURE — 20551 NJX 1 TENDON ORIGIN/INSJ: CPT | Performed by: PHYSICIAN ASSISTANT

## 2022-06-23 NOTE — PROGRESS NOTES
Subjective   Jason Lane is a 66 y.o. male  Elbow Pain (Right elbow pain with some swelling )      History of Present Illness   The patient is a 66-year-old male seen today to discuss right elbow pain with some swelling. He is accompanied by an adult female.    The patient reports experiencing right elbow pain, right elbow swelling, and difficulty sleeping due to symptoms for approximately 4 days, with a history of intermittent symptoms for many years. The patient denies experiencing any recent injury to the right arm or right elbow, or any repetitive motions.    Past Surgical History:  The patient has a history of right wrist surgery performed many years ago.      The following portions of the patient's history were reviewed and updated as appropriate: allergies, current medications, past social history and problem list    Review of Systems   Constitutional: Negative for activity change.   Musculoskeletal: Positive for arthralgias, joint swelling and myalgias. Negative for gait problem.   Skin: Negative.    Neurological: Negative for weakness and numbness.       Objective     Vitals:    06/23/22 1117   BP: 126/60   Pulse: 72   Temp: 97.2 °F (36.2 °C)   SpO2: 99%       Physical Exam  Vitals and nursing note reviewed.   Constitutional:       General: He is not in acute distress.     Appearance: Normal appearance. He is well-developed. He is not ill-appearing, toxic-appearing or diaphoretic.   Cardiovascular:      Pulses: Normal pulses.   Musculoskeletal:         General: Swelling and tenderness present. No deformity or signs of injury. Normal range of motion.      Comments: Tender lateral epicondyle rt elbow with soft tissue edema. No effusion, non erythematous, no increased warmth.    Skin:     General: Skin is warm and dry.      Coloration: Skin is not pale.      Findings: No erythema or rash.   Neurological:      Mental Status: He is alert.      Sensory: No sensory deficit.      Motor: No abnormal muscle  tone.      Coordination: Coordination normal.         Assessment & Plan    1. Right elbow lateral epicondylitis  - The patient had received triamcinolone acetonide (KENALOG) injection 10 mg performed at the time of the encounter.  - After obtaining the patient's consent, under sterile procedure 1 cc of Kenalog 10 mg with 1 cc plain 2% lidocaine injected to tendon sheath at lateral condyle right elbow, the patient tolerated the procedure well.  - Routine wound care given and discussed for at-home care.  - Recommend the patient begin wearing a tennis elbow strap for several days, avoiding any heavy lifting for approximately 2 weeks, and apply cold-compresses for approximately 5 minutes to 10 minutes twice daily as needed  - I advised the patient not to start building any decks or other large-scale projects for the next couple of weeks.      Diagnoses and all orders for this visit:    1. Lateral epicondylitis of right elbow (Primary)  -     triamcinolone acetonide (KENALOG) injection 10 mg          Transcribed from ambient dictation for Anali Weinberg PA-C by AZALIA WATTS.  06/23/22   12:38 EDT    Patient verbalized consent to the visit recording.

## 2022-08-16 ENCOUNTER — HOSPITAL ENCOUNTER (OUTPATIENT)
Dept: CARDIOLOGY | Facility: HOSPITAL | Age: 67
Discharge: HOME | End: 2022-08-16

## 2022-08-16 ENCOUNTER — APPOINTMENT (OUTPATIENT)
Dept: CARDIOLOGY | Facility: HOSPITAL | Age: 67
End: 2022-08-16

## 2022-08-16 ENCOUNTER — LAB (OUTPATIENT)
Dept: LAB | Facility: HOSPITAL | Age: 67
End: 2022-08-16

## 2022-08-16 VITALS — WEIGHT: 190 LBS | HEIGHT: 71 IN | BODY MASS INDEX: 26.6 KG/M2

## 2022-08-16 DIAGNOSIS — I48.0 PAROXYSMAL ATRIAL FIBRILLATION: ICD-10-CM

## 2022-08-16 DIAGNOSIS — F10.10 ALCOHOL ABUSE: ICD-10-CM

## 2022-08-16 DIAGNOSIS — I50.20 SYSTOLIC CONGESTIVE HEART FAILURE, UNSPECIFIED HF CHRONICITY: ICD-10-CM

## 2022-08-16 DIAGNOSIS — I50.21 ACUTE SYSTOLIC CONGESTIVE HEART FAILURE: ICD-10-CM

## 2022-08-16 DIAGNOSIS — I48.0 PAF (PAROXYSMAL ATRIAL FIBRILLATION): ICD-10-CM

## 2022-08-16 LAB
BH CV ECHO MEAS - AO MAX PG: 2.33 MMHG
BH CV ECHO MEAS - AO MEAN PG: 1 MMHG
BH CV ECHO MEAS - AO ROOT DIAM: 4.3 CM
BH CV ECHO MEAS - AO V2 MAX: 76.4 CM/SEC
BH CV ECHO MEAS - AO V2 VTI: 20.4 CM
BH CV ECHO MEAS - AVA(I,D): 3.5 CM2
BH CV ECHO MEAS - EDV(CUBED): 142.2 ML
BH CV ECHO MEAS - EDV(MOD-SP2): 123 ML
BH CV ECHO MEAS - EDV(MOD-SP4): 159 ML
BH CV ECHO MEAS - EF(MOD-BP): 48 %
BH CV ECHO MEAS - EF(MOD-SP2): 52.8 %
BH CV ECHO MEAS - EF(MOD-SP4): 43.4 %
BH CV ECHO MEAS - ESV(CUBED): 71 ML
BH CV ECHO MEAS - ESV(MOD-SP2): 58 ML
BH CV ECHO MEAS - ESV(MOD-SP4): 90 ML
BH CV ECHO MEAS - FS: 20.7 %
BH CV ECHO MEAS - IVS/LVPW: 1.11 CM
BH CV ECHO MEAS - IVSD: 1 CM
BH CV ECHO MEAS - LA DIMENSION: 3.8 CM
BH CV ECHO MEAS - LV DIASTOLIC VOL/BSA (35-75): 77.1 CM2
BH CV ECHO MEAS - LV MASS(C)D: 182.4 GRAMS
BH CV ECHO MEAS - LV MAX PG: 2.5 MMHG
BH CV ECHO MEAS - LV MEAN PG: 1 MMHG
BH CV ECHO MEAS - LV SYSTOLIC VOL/BSA (12-30): 43.6 CM2
BH CV ECHO MEAS - LV V1 MAX: 79 CM/SEC
BH CV ECHO MEAS - LV V1 VTI: 18.7 CM
BH CV ECHO MEAS - LVIDD: 5.2 CM
BH CV ECHO MEAS - LVIDS: 4.1 CM
BH CV ECHO MEAS - LVOT AREA: 3.8 CM2
BH CV ECHO MEAS - LVOT DIAM: 2.2 CM
BH CV ECHO MEAS - LVPWD: 0.9 CM
BH CV ECHO MEAS - MV A MAX VEL: 46.4 CM/SEC
BH CV ECHO MEAS - MV DEC SLOPE: 148 CM/SEC2
BH CV ECHO MEAS - MV DEC TIME: 0.18 MSEC
BH CV ECHO MEAS - MV E MAX VEL: 69.1 CM/SEC
BH CV ECHO MEAS - MV E/A: 1.49
BH CV ECHO MEAS - MV MAX PG: 2.27 MMHG
BH CV ECHO MEAS - MV MEAN PG: 1 MMHG
BH CV ECHO MEAS - MV P1/2T: 150.4 MSEC
BH CV ECHO MEAS - MV V2 VTI: 25.8 CM
BH CV ECHO MEAS - MVA(P1/2T): 1.46 CM2
BH CV ECHO MEAS - MVA(VTI): 2.8 CM2
BH CV ECHO MEAS - PA ACC SLOPE: 536 CM/SEC2
BH CV ECHO MEAS - PA ACC TIME: 0.16 SEC
BH CV ECHO MEAS - PA PR(ACCEL): 7.9 MMHG
BH CV ECHO MEAS - RAP SYSTOLE: 8 MMHG
BH CV ECHO MEAS - RVSP: 28 MMHG
BH CV ECHO MEAS - SI(MOD-SP2): 31.5 ML/M2
BH CV ECHO MEAS - SI(MOD-SP4): 33.4 ML/M2
BH CV ECHO MEAS - SV(LVOT): 71.1 ML
BH CV ECHO MEAS - SV(MOD-SP2): 65 ML
BH CV ECHO MEAS - SV(MOD-SP4): 69 ML
BH CV ECHO MEAS - TAPSE (>1.6): 1.7 CM
BH CV ECHO MEAS - TR MAX PG: 20.3 MMHG
BH CV ECHO MEAS - TR MAX VEL: 225 CM/SEC
BH CV VAS BP LEFT ARM: NORMAL MMHG
BH CV XLRA - RV BASE: 4.9 CM
BH CV XLRA - RV LENGTH: 6.7 CM
BH CV XLRA - RV MID: 3.4 CM
BH CV XLRA - TDI S': 12.1 CM/SEC
LEFT ATRIUM VOLUME INDEX: 31.1 ML/M2
MAXIMAL PREDICTED HEART RATE: 156 BPM
STRESS TARGET HR: 133 BPM

## 2022-08-16 PROCEDURE — 93306 TTE W/DOPPLER COMPLETE: CPT

## 2022-08-16 PROCEDURE — 93306 TTE W/DOPPLER COMPLETE: CPT | Performed by: INTERNAL MEDICINE

## 2022-08-25 ENCOUNTER — TELEPHONE (OUTPATIENT)
Dept: CARDIOLOGY | Facility: CLINIC | Age: 67
End: 2022-08-25

## 2022-08-25 NOTE — TELEPHONE ENCOUNTER
Patient's daughter would like to know if her dad can discontinue the Life Vest. He had a f/u ECHO on 8/16/22. His EF is now 48%.

## 2022-09-28 ENCOUNTER — OFFICE VISIT (OUTPATIENT)
Dept: CARDIOLOGY | Facility: CLINIC | Age: 67
End: 2022-09-28

## 2022-09-28 VITALS
HEART RATE: 59 BPM | HEIGHT: 71 IN | OXYGEN SATURATION: 98 % | WEIGHT: 196.6 LBS | BODY MASS INDEX: 27.52 KG/M2 | SYSTOLIC BLOOD PRESSURE: 122 MMHG | DIASTOLIC BLOOD PRESSURE: 68 MMHG

## 2022-09-28 DIAGNOSIS — I48.19 ATRIAL FIBRILLATION, PERSISTENT: Primary | ICD-10-CM

## 2022-09-28 DIAGNOSIS — I42.0 CARDIOMYOPATHY, DILATED: ICD-10-CM

## 2022-09-28 DIAGNOSIS — I48.0 PAROXYSMAL ATRIAL FIBRILLATION: Primary | ICD-10-CM

## 2022-09-28 PROCEDURE — 99213 OFFICE O/P EST LOW 20 MIN: CPT | Performed by: INTERNAL MEDICINE

## 2022-09-28 PROCEDURE — 93000 ELECTROCARDIOGRAM COMPLETE: CPT | Performed by: INTERNAL MEDICINE

## 2022-09-28 RX ORDER — LISINOPRIL 20 MG/1
20 TABLET ORAL DAILY
Qty: 90 TABLET | Refills: 3 | Status: SHIPPED | OUTPATIENT
Start: 2022-09-28

## 2022-09-28 NOTE — PROGRESS NOTES
Jason NATASHA Lane  1955  [phone]    09/28/2022    [facility]     Referring Provider: No ref. provider found     Anali Weinberg PA-C  [id number] [facility] [address]  [place] [id number]    Chief Complaint   Patient presents with   • Atrial Fibrillation     Problem List:   1. Persistent Atrial fibrillation:  a. Rapid ventricular response of unknown duration, hospitalized on 01/01/2012.   b. sotalol initiated, 01/02/2012, discontinued 5/13/22  c. Amiodarone initiated 5/13/22  2. Dilated cardiomyopathy new onset 5/13/2022 diagnosed by AMY guided cardioversion  a. Echocardiogram, January 2012 with mild mitral regurgitation, ejection fraction 35% to 40%.   b. Echocardiogram, May 2012: Left ventricular ejection fraction of 45% to 50%, mild TR.   c. Echocardiogram, 12/15/2014: EF 50% to 55%. Mild-to-moderate TR.  d. AMY 5/2022 LVEF 20%, mod-severe MR  e. ECV 5/13/2022 Amiodarone   f. Echo 8/16/22: EF=48%, LV global hypokinesis, LV diastolic function is consistent with grade I impaired relaxation. Normal RV function, cavity mildly dilated, borderline low RV systolic, Mild MR. LA mildly dilated. Normal RA.   3. Alcohol abuse.   4. Tobacco abuse.   5. Hypertension.   6. Acute systolic congestive heart failure secondary to atrial fibrillation with rapid ventricular response.   7. Syncope:  a. Left and right heart catheterization, Dr. Larson, 03/15/2013, with normal pressures and normal coronary arteries: EF 55%      Allergies  No Known Allergies    Current Medications    Current Outpatient Medications:   •  amiodarone (PACERONE) 200 MG tablet, Take 1 tablet by mouth 2 (Two) Times a Day for 10 days, THEN 1 tablet Daily for 360 days., Disp: 90 tablet, Rfl: 6  •  apixaban (ELIQUIS) 5 MG tablet tablet, Take 1 tablet by mouth Every 12 (Twelve) Hours., Disp: 60 tablet, Rfl: 5  •  azelastine (ASTEPRO) 0.15 % solution nasal spray, 1 spray into the nostril(s) as directed by provider 2  "(Two) Times a Day., Disp: 3 each, Rfl: 4  •  carvedilol (COREG) 6.25 MG tablet, Take 1 tablet by mouth 2 (Two) Times a Day., Disp: 60 tablet, Rfl: 11  •  dorzolamide (TRUSOPT) 2 % ophthalmic solution, Administer 1 drop to both eyes 2 (Two) Times a Day., Disp: , Rfl:   •  fluticasone (FLONASE) 50 MCG/ACT nasal spray, USE 2 SPRAYS NASALLY EVERY DAY AS DIRECTED BY PROVIDER, Disp: 48 g, Rfl: 3  •  latanoprost (XALATAN) 0.005 % ophthalmic solution, Administer 1 drop to both eyes Every Night., Disp: , Rfl:   •  timolol (TIMOPTIC) 0.5 % ophthalmic solution, Administer 1 drop to both eyes Daily., Disp: , Rfl:   •  lisinopril (PRINIVIL,ZESTRIL) 20 MG tablet, Take 1 tablet by mouth Daily., Disp: 90 tablet, Rfl: 3    History of Present Illness     Pt presents for follow up of AF/DCM/CHF. Since we last saw the pt, pt denies any AF episodes. SOB is very much improved. Denies CP, LH, and dizziness. Denies any hospitalizations, ER visits, bleeding, or TIA/CVA symptoms. Overall feels well.    ROS:  General:  Denies fatigue, weight gain or loss  Cardiovascular:  Denies CP, PND, syncope, near syncope, edema or palpitations.  Pulmonary:  Denies JONES, cough, or wheezing      Vitals:    09/28/22 0947   BP: 122/68   BP Location: Left arm   Patient Position: Sitting   Pulse: 59   SpO2: 98%   Weight: 89.2 kg (196 lb 9.6 oz)   Height: 180.3 cm (71\")     Body mass index is 27.42 kg/m².  PE:  General: NAD  Neck: no JVD, no carotid bruits, no TM  Heart RRR, NL S1, S2, S4 present, no rubs, murmurs  Lungs: CTA, no wheezes, rhonchi, or rales  Abd: soft, non-tender, NL BS  Ext: No musculoskeletal deformities, no edema, cyanosis, or clubbing  Psych: normal mood and affect    Diagnostic Data:        ECG 12 Lead    Date/Time: 9/28/2022 10:34 AM  Performed by: Jeffrey Goff MD  Authorized by: Jeffrey Goff MD   Comparison: compared with previous ECG   Similar to previous ECG  Comparison to previous ECG: Sinus bradycardia has replaced normal " sinus rhythm   Rhythm: sinus bradycardia  Rate: bradycardic  BPM: 57              1. Atrial fibrillation, persistent (HCC)    2. Cardiomyopathy, dilated (HCC)             1) Persistent atrial fibrillation:  - S/p AMY guided ECV 5/13/2022-sotalol discontinued and initiation of amiodarone therapy.  He is taking 200mg daily. Thyroid normal. Liver enzymes normal. We will repeat labs CMP and thyroid function panel in 4 months.  - Continue present medications.      2) Anticoagulation: Elliquis 5mg BID     3) DCM/CHF prob due AF/RVR + ETOH.  Class I HF. Switch to lisinopril 20 mg po daily. Stay on coreg  - follow-up echocardiogram 8/16/22: EF improved 48%    4) alcohol use, heavy use in the past up to 12-15 beers a day.  Currently drinking 3 beers per day.  Discussed patient needs to immediately stop drinking or drastically reduced amount.       F/up in 4 months    Scribed for Jeffrey Goff MD by SUSANA Gurrola. 9/28/2022  17:08 EDT     I, SUSANA Villatoro, personally performed the services described in this documentation as scribed by the above named individual in my presence, and it is both accurate and complete.  9/28/2022  17:08 EDT

## 2022-09-29 ENCOUNTER — PATIENT MESSAGE (OUTPATIENT)
Dept: CARDIOLOGY | Facility: CLINIC | Age: 67
End: 2022-09-29

## 2022-10-03 DIAGNOSIS — J30.9 ALLERGIC RHINITIS, UNSPECIFIED SEASONALITY, UNSPECIFIED TRIGGER: ICD-10-CM

## 2022-10-04 RX ORDER — FLUTICASONE PROPIONATE 50 MCG
SPRAY, SUSPENSION (ML) NASAL
Qty: 48 G | Refills: 3 | Status: SHIPPED | OUTPATIENT
Start: 2022-10-04 | End: 2022-12-13

## 2022-10-12 RX ORDER — AZELASTINE HCL 205.5 UG/1
SPRAY NASAL
Qty: 60 ML | Refills: 1 | Status: SHIPPED | OUTPATIENT
Start: 2022-10-12

## 2022-11-22 ENCOUNTER — OFFICE VISIT (OUTPATIENT)
Dept: FAMILY MEDICINE CLINIC | Facility: CLINIC | Age: 67
End: 2022-11-22

## 2022-11-22 VITALS
HEART RATE: 58 BPM | WEIGHT: 199 LBS | BODY MASS INDEX: 27.86 KG/M2 | TEMPERATURE: 97.2 F | DIASTOLIC BLOOD PRESSURE: 76 MMHG | SYSTOLIC BLOOD PRESSURE: 138 MMHG | OXYGEN SATURATION: 98 % | HEIGHT: 71 IN

## 2022-11-22 DIAGNOSIS — M25.511 CHRONIC RIGHT SHOULDER PAIN: ICD-10-CM

## 2022-11-22 DIAGNOSIS — R05.9 COUGH, UNSPECIFIED TYPE: Primary | ICD-10-CM

## 2022-11-22 DIAGNOSIS — G89.29 CHRONIC ELBOW PAIN, RIGHT: ICD-10-CM

## 2022-11-22 DIAGNOSIS — M25.521 CHRONIC ELBOW PAIN, RIGHT: ICD-10-CM

## 2022-11-22 DIAGNOSIS — R09.82 PND (POST-NASAL DRIP): ICD-10-CM

## 2022-11-22 DIAGNOSIS — G89.29 CHRONIC RIGHT SHOULDER PAIN: ICD-10-CM

## 2022-11-22 LAB
EXPIRATION DATE: NORMAL
FLUAV AG UPPER RESP QL IA.RAPID: NOT DETECTED
FLUBV AG UPPER RESP QL IA.RAPID: NOT DETECTED
INTERNAL CONTROL: NORMAL
Lab: NORMAL
SARS-COV-2 AG UPPER RESP QL IA.RAPID: NOT DETECTED

## 2022-11-22 PROCEDURE — 99213 OFFICE O/P EST LOW 20 MIN: CPT | Performed by: PHYSICIAN ASSISTANT

## 2022-11-22 PROCEDURE — 87428 SARSCOV & INF VIR A&B AG IA: CPT | Performed by: PHYSICIAN ASSISTANT

## 2022-11-22 RX ORDER — LORATADINE 10 MG/1
10 TABLET ORAL DAILY
Qty: 30 TABLET | Refills: 5 | Status: SHIPPED | OUTPATIENT
Start: 2022-11-22

## 2022-11-22 NOTE — PROGRESS NOTES
Subjective   Jason Lane is a 67 y.o. male  sinus drainage (Sinus drainage and cough x2 weeks ) and Elbow Pain (Right elbow pain x2 weeks)      History of Present Illness     The patient presents today to discuss sinus drainage, cough, and elbow pain. He reports that he is doing well. The patient states that he has been experiencing sinus drainage and cough. He reports that he has been using 2 nasal sprays. The patient denies any fever.    The patient reports that his right elbow pain is the same as it was at his last visit in 06/2022. He states that it got better for a little while; however, if he uses it very much at all, it comes back. The patient reports that he has tried a brace. The injection given in the office did not help.       The following portions of the patient's history were reviewed and updated as appropriate: allergies, current medications, past social history and problem list    Review of Systems   Constitutional: Positive for activity change. Negative for chills, fatigue and fever.   HENT: Positive for congestion, postnasal drip and rhinorrhea. Negative for ear pain, hearing loss, sinus pressure, sneezing, sore throat and trouble swallowing.    Eyes: Negative for itching.   Respiratory: Negative.  Negative for cough.    Cardiovascular: Negative for chest pain.   Musculoskeletal: Positive for arthralgias and myalgias. Negative for gait problem and joint swelling.   Skin: Negative.    Allergic/Immunologic: Positive for environmental allergies.   Neurological: Negative for weakness, numbness and headaches.       Objective     Vitals:    11/22/22 1328   BP: 138/76   Pulse: 58   Temp: 97.2 °F (36.2 °C)   SpO2: 98%       Physical Exam  Vitals and nursing note reviewed.   Constitutional:       General: He is not in acute distress.     Appearance: Normal appearance. He is well-developed. He is not ill-appearing, toxic-appearing or diaphoretic.   HENT:      Head: Normocephalic and atraumatic.       Right Ear: External ear normal.      Left Ear: External ear normal.      Nose: Nose normal.      Mouth/Throat:      Pharynx: No posterior oropharyngeal erythema.   Eyes:      Conjunctiva/sclera: Conjunctivae normal.   Cardiovascular:      Rate and Rhythm: Normal rate and regular rhythm.      Heart sounds: Normal heart sounds.   Pulmonary:      Effort: Pulmonary effort is normal. No respiratory distress.      Breath sounds: Normal breath sounds.   Musculoskeletal:         General: Tenderness ( right shoulder and lateral epicondyle and) present.   Neurological:      Mental Status: He is alert.         Assessment & Plan     Diagnoses and all orders for this visit:    1. Cough, unspecified type (Primary)  -     POCT SARS-CoV-2 Antigen ELIF + Flu    2. PND (post-nasal drip)  -     POCT SARS-CoV-2 Antigen ELIF + Flu    3. Chronic elbow pain, right  -     Ambulatory Referral to Orthopedic Surgery  -     Ambulatory Referral to Orthopedic Surgery    4. Chronic right shoulder pain  -     Ambulatory Referral to Orthopedic Surgery    Other orders  -     loratadine (Claritin) 10 MG tablet; Take 1 tablet by mouth Daily. FOR CONGESTION  Dispense: 30 tablet; Refill: 5    Sinus drainage and cough with elbow pain. The patient was tested negative for COVID-19 and influenza today. I will send in a prescription for Mucinex to the patient's pharmacy. I will refer the patient to Dr. Lagunas for further evaluation and treatment.      Transcribed from ambient dictation for Anali Weinberg PA-C by Araseli Rosales.  11/22/22   14:16 EST    Patient or patient representative verbalized consent to the visit recording.  I have personally performed the services described in this document as transcribed by the above individual, and it is both accurate and complete.  Anali Weinberg PA-C  11/23/2022  12:04 EST

## 2022-11-30 NOTE — PATIENT INSTRUCTIONS
As we discussed, you have robust reflexes here, and your inflammatory markers are all negative.  Your x-ray does not show any evidence of inflammation or osteoarthritis at this point, and I do think that you are stable to continue your outpatient work-up as you have been.  Please come back to the ER immediately with any other concerns you have, new symptoms, numbness or tingling, or with any other concerns you have.  Please do follow with either your regular doctor or your neurologist in the next 1 week.   Stop asa   Continue eliquis 5 mg twice a day   Take 1/2 dose of sotalol today at 3 pm and repeat dose again tomorrow at 3 pm in addition to normal doses  Call Felicitas on Monday and let me know if you are still in afib

## 2022-12-01 ENCOUNTER — OFFICE VISIT (OUTPATIENT)
Dept: ORTHOPEDIC SURGERY | Facility: CLINIC | Age: 67
End: 2022-12-01

## 2022-12-01 VITALS
HEIGHT: 71 IN | BODY MASS INDEX: 27.87 KG/M2 | DIASTOLIC BLOOD PRESSURE: 76 MMHG | SYSTOLIC BLOOD PRESSURE: 124 MMHG | WEIGHT: 199.08 LBS

## 2022-12-01 DIAGNOSIS — N18.31 STAGE 3A CHRONIC KIDNEY DISEASE: ICD-10-CM

## 2022-12-01 DIAGNOSIS — M77.11 RIGHT LATERAL EPICONDYLITIS: Primary | ICD-10-CM

## 2022-12-01 DIAGNOSIS — M25.521 RIGHT ELBOW PAIN: ICD-10-CM

## 2022-12-01 DIAGNOSIS — I48.0 PAF (PAROXYSMAL ATRIAL FIBRILLATION): ICD-10-CM

## 2022-12-01 PROCEDURE — 20551 NJX 1 TENDON ORIGIN/INSJ: CPT | Performed by: STUDENT IN AN ORGANIZED HEALTH CARE EDUCATION/TRAINING PROGRAM

## 2022-12-01 PROCEDURE — 99204 OFFICE O/P NEW MOD 45 MIN: CPT | Performed by: STUDENT IN AN ORGANIZED HEALTH CARE EDUCATION/TRAINING PROGRAM

## 2022-12-01 RX ORDER — LIDOCAINE HYDROCHLORIDE 10 MG/ML
2 INJECTION, SOLUTION EPIDURAL; INFILTRATION; INTRACAUDAL; PERINEURAL
Status: COMPLETED | OUTPATIENT
Start: 2022-12-01 | End: 2022-12-01

## 2022-12-01 RX ORDER — TRIAMCINOLONE ACETONIDE 40 MG/ML
40 INJECTION, SUSPENSION INTRA-ARTICULAR; INTRAMUSCULAR
Status: COMPLETED | OUTPATIENT
Start: 2022-12-01 | End: 2022-12-01

## 2022-12-01 RX ADMIN — TRIAMCINOLONE ACETONIDE 40 MG: 40 INJECTION, SUSPENSION INTRA-ARTICULAR; INTRAMUSCULAR at 11:16

## 2022-12-01 RX ADMIN — LIDOCAINE HYDROCHLORIDE 2 ML: 10 INJECTION, SOLUTION EPIDURAL; INFILTRATION; INTRACAUDAL; PERINEURAL at 11:16

## 2022-12-01 NOTE — PROGRESS NOTES
[facility] Office Visit     Office Visit       Date: 12/01/2022   Patient Name: Jason Lane  MRN: [id number]  YOB: 1955    Referring Physician: Anali Weinberg PA-C     Chief Complaint:   Chief Complaint   Patient presents with   • Right Elbow - Pain       History of Present Illness: Jason Lane is a 67 y.o. male who presents with new problem of: right elbow pain.  He localizes pain to the lateral epicondyle.  Onset: atraumatic and gradual in nature. The issue has been ongoing for 1 year(s). Pain is a 6/10 on the pain scale. Pain is described as aching, burning and stabbing. Associated symptoms include pain. The pain is worse with sleeping; resting improve the pain. Previous treatments have included: bracing and steroid injection (last injection 06/23/2022).    I have reviewed the following portions of the patient's history:History of Present Illness and review of systems.    Subjective   Review of Systems: Review of Systems   Constitutional: Negative for chills, fever, unexpected weight gain and unexpected weight loss.   HENT: Negative for congestion, postnasal drip and rhinorrhea.    Eyes: Negative for blurred vision.   Respiratory: Negative for shortness of breath.    Cardiovascular: Negative for leg swelling.   Gastrointestinal: Negative for abdominal pain, nausea and vomiting.   Genitourinary: Negative for difficulty urinating.   Musculoskeletal: Positive for arthralgias. Negative for gait problem, joint swelling and myalgias.   Skin: Negative for skin lesions and wound.   Neurological: Negative for dizziness, weakness, light-headedness and numbness.   Hematological: Does not bruise/bleed easily.   Psychiatric/Behavioral: Negative for depressed mood.        Past Medical History:   Past Medical History:   Diagnosis Date   • Acute systolic congestive heart failure (HCC)     Acute systolic congestive heart failure secondary to atrial  fibrillation with rapid ventricular response.   • Alcohol abuse    • Atrial fibrillation (HCC)    • Congestive heart failure (HCC)     History of congestive heart failure with normalized ejection fraction of 50% to 55%, December 2014. Continue lisinopril and beta blocker in the form of sotalol.  He has current class I symptoms   • Hearing loss    • Hypertension    • PAF (paroxysmal atrial fibrillation) (HCC)    • Syncope     Left and right heart catheterization, Dr. Larson, 03/15/2013, with normal pressures and normal coronary arteries: EF 55%.   • Tobacco abuse        Past Surgical History:   Past Surgical History:   Procedure Laterality Date   • COLONOSCOPY     • TEETH EXTRACTION     • WRIST SURGERY      2015       Family History:   Family History   Problem Relation Age of Onset   • Anemia Other         cousins   • Heart disease Mother    • Thyroid disease Mother    • Hyperlipidemia Mother    • Kidney disease Mother    • Heart disease Maternal Uncle    • Heart disease Paternal Uncle        Social History:   Social History     Socioeconomic History   • Marital status:    Tobacco Use   • Smoking status: Former     Types: Cigarettes     Quit date: 1/1/2012     Years since quitting: 10.9   • Smokeless tobacco: Never   Vaping Use   • Vaping Use: Never used   Substance and Sexual Activity   • Alcohol use: Yes     Alcohol/week: 1.0 - 6.0 standard drink     Types: 1 - 6 Cans of beer per week     Comment: daily   • Drug use: No   • Sexual activity: Defer       Medications:   Current Outpatient Medications:   •  amiodarone (PACERONE) 200 MG tablet, Take 1 tablet by mouth 2 (Two) Times a Day for 10 days, THEN 1 tablet Daily for 360 days., Disp: 90 tablet, Rfl: 6  •  apixaban (ELIQUIS) 5 MG tablet tablet, Take 1 tablet by mouth Every 12 (Twelve) Hours., Disp: 60 tablet, Rfl: 5  •  azelastine (ASTEPRO) 0.15 % solution nasal spray, USE 1 SPRAY NASALLY TWICE DAILY AS DIRECTED BY PROVIDER, Disp: 60 mL, Rfl: 1  •  carvedilol  "(COREG) 6.25 MG tablet, Take 1 tablet by mouth 2 (Two) Times a Day., Disp: 60 tablet, Rfl: 11  •  dorzolamide (TRUSOPT) 2 % ophthalmic solution, Administer 1 drop to both eyes 2 (Two) Times a Day., Disp: , Rfl:   •  fluticasone (FLONASE) 50 MCG/ACT nasal spray, USE 2 SPRAYS NASALLY EVERY DAY  AS  DIRECTED  BY  PROVIDER, Disp: 48 g, Rfl: 3  •  latanoprost (XALATAN) 0.005 % ophthalmic solution, Administer 1 drop to both eyes Every Night., Disp: , Rfl:   •  lisinopril (PRINIVIL,ZESTRIL) 20 MG tablet, Take 1 tablet by mouth Daily., Disp: 90 tablet, Rfl: 3  •  loratadine (Claritin) 10 MG tablet, Take 1 tablet by mouth Daily. FOR CONGESTION, Disp: 30 tablet, Rfl: 5  •  timolol (TIMOPTIC) 0.5 % ophthalmic solution, Administer 1 drop to both eyes Daily., Disp: , Rfl:     Allergies: No Known Allergies    I reviewed the patient's chief complaint, history of present illness, review of systems, past medical history, surgical history, family history, social history, medications and allergy list.     Objective    Vital Signs:   Vitals:    12/01/22 1048   BP: 124/76   Weight: 90.3 kg (199 lb 1.2 oz)   Height: 180.3 cm (70.98\")     Body mass index is 27.78 kg/m².   BMI is >= 25 and <30. (Overweight) The following options were offered after discussion;: exercise counseling/recommendations and nutrition counseling/recommendations     Patient reports that he is a former smoker.  He quit smoking in 2012.  He has not resumed smoking since that time.  This behavior was applauded and she was encouraged to continue in smoking cessation.  We will continue to monitor at subsequent visits.    Ortho Exam:  Constitutional: General Appearance: healthy-appearing, NAD, and normal body habitus.   Psychiatric: Orientation: oriented to person, place, and time. Mood and Affect: normal mood and affect and active and alert.   Skin: Right Upper Extremity: normal. Left Upper Extremity: normal.   Right Elbow Exam:   Normal contour of the elbow forearm and " wrist.   Negative ecchymosis and negative swelling.   Full range of motion of the elbow without pain. Full supination and pronation.   no laxity with varus or valgus stress   Positive tenderness to palpation over the lateral epicondyle and along the extensor digitorum tendon origin.   Pain with resisted wrist extension.   Pain with resisted middle finger extension.   Negative pain with index finger extension.   Sensation grossly intact to all digits.   Radial pulse intact.  Left Elbow Exam:   shows no evidence of tenderness to palpation.   no pain with extension of the wrist or middle finger.   Sensation grossly intact throughout.      Results Review:   Imaging Results (Last 24 Hours)     Procedure Component Value Units Date/Time    XR Elbow 3+ View Right [[id number]] Resulted: 12/01/22 1105     Updated: 12/01/22 1106    Narrative:      Indication: Right elbow pain    Views:  AP oblique and lateral views of the elbow are submitted.    Impression: Alignment is normal. There are no acute findings. There is no   fracture subluxation or dislocation.  Calcification noted at the common   extensor tendon off of the lateral epicondyle.    Comparison: No additional images are available for comparison review.          Procedures    Assessment / Plan    Assessment/Plan:   Diagnoses and all orders for this visit:    1. Right elbow pain (Primary)  -     XR Elbow 3+ View Right    2. PAF (paroxysmal atrial fibrillation) ([facility])    3. Stage 3a chronic kidney disease ([facility])    4. Right lateral epicondylitis      A years worth of pain at the lateral epicondyle exacerbated by movement and work.  His radiographs show a calcification at the common extensor tendon off of the lateral epicondyle.  We discussed the diagnosis and treatment options at some length today. We specifically discussed lateral humeral epicondylitis and answered questions to their satisfaction. The natural history of tennis elbow is one of gradual resolution over several  months, and there are multiple treatment interventions which may provide symptomatic relief although they do not affect the overall duration of this condition. We discussed bracing, activity modification, use of oral medication, stretching and strengthening exercises, and other possible treatment modalities including ultrasound guided interventions.  He has a strap brace that he uses infrequently.  I encouraged him to attempt to use it more often because as he states, it does help.  I reviewed his past history including 1 of proximal atrial fibrillation he is currently on Eliquis.  He also has a creatinine of 1.38 with an EGFR of 57 on 8/16/2022 that places him in chronic kidney disease stage IIIa.  Therefore I recommended that we avoid any anti-inflammatory medication.  I also reviewed office notes from 6/23/2020 to 11/22/2022 with Laura beltran.  He did receive a corticosteroid injection with only a minimal amount of corticosteroid.  This is likely due to the aforementioned atrial fibrillation.  He tolerated this well and had no rapid ventricular response.  Therefore, I think it would be safe to increase his dose of steroid today especially given the other limitations.  I will also provide him with a handout of exercises to help with his elbow.  I plan to see him back in 4 to 6 weeks to monitor his response and I will be glad to see him for wrist or shoulder issues at that time.      Follow Up:   No follow-ups on file.      Vic Lagunas MD  [facility]

## 2022-12-01 NOTE — PROGRESS NOTES
Procedure   - Hand/Upper Extremity Injection: R elbow for lateral epicondylitis on 12/1/2022 11:16 AM  Indications: pain  Details: (23g) needle, lateral approach  Medications: 40 mg triamcinolone acetonide 40 MG/ML; 2 mL lidocaine PF 1% 1 %  Outcome: tolerated well, no immediate complications  Procedure, treatment alternatives, risks and benefits explained, specific risks discussed. Consent was given by the patient. Immediately prior to procedure a time out was called to verify the correct patient, procedure, equipment, support staff and site/side marked as required. Patient was prepped and draped in the usual sterile fashion.

## 2022-12-13 ENCOUNTER — OFFICE VISIT (OUTPATIENT)
Dept: FAMILY MEDICINE CLINIC | Facility: CLINIC | Age: 67
End: 2022-12-13

## 2022-12-13 VITALS
HEIGHT: 71 IN | BODY MASS INDEX: 28.56 KG/M2 | OXYGEN SATURATION: 98 % | HEART RATE: 59 BPM | SYSTOLIC BLOOD PRESSURE: 128 MMHG | DIASTOLIC BLOOD PRESSURE: 70 MMHG | WEIGHT: 204 LBS | TEMPERATURE: 97 F

## 2022-12-13 DIAGNOSIS — I10 PRIMARY HYPERTENSION: ICD-10-CM

## 2022-12-13 DIAGNOSIS — D64.9 ANEMIA, UNSPECIFIED TYPE: ICD-10-CM

## 2022-12-13 DIAGNOSIS — Z00.00 MEDICARE ANNUAL WELLNESS VISIT, SUBSEQUENT: Primary | ICD-10-CM

## 2022-12-13 DIAGNOSIS — J30.9 ALLERGIC RHINITIS, UNSPECIFIED SEASONALITY, UNSPECIFIED TRIGGER: ICD-10-CM

## 2022-12-13 DIAGNOSIS — Z12.5 PROSTATE CANCER SCREENING: ICD-10-CM

## 2022-12-13 PROCEDURE — 1170F FXNL STATUS ASSESSED: CPT | Performed by: PHYSICIAN ASSISTANT

## 2022-12-13 PROCEDURE — G0439 PPPS, SUBSEQ VISIT: HCPCS | Performed by: PHYSICIAN ASSISTANT

## 2022-12-13 PROCEDURE — 1159F MED LIST DOCD IN RCRD: CPT | Performed by: PHYSICIAN ASSISTANT

## 2022-12-13 PROCEDURE — 96160 PT-FOCUSED HLTH RISK ASSMT: CPT | Performed by: PHYSICIAN ASSISTANT

## 2022-12-13 PROCEDURE — 1125F AMNT PAIN NOTED PAIN PRSNT: CPT | Performed by: PHYSICIAN ASSISTANT

## 2022-12-13 RX ORDER — FLUTICASONE PROPIONATE 50 MCG
SPRAY, SUSPENSION (ML) NASAL
Qty: 48 G | Refills: 3 | Status: SHIPPED | OUTPATIENT
Start: 2022-12-13

## 2022-12-13 NOTE — PROGRESS NOTES
The ABCs of the Annual Wellness Visit  Subsequent Medicare Wellness Visit    Chief Complaint   Patient presents with   • Medicare Wellness-subsequent     Sub Medicare Wellness      Subjective   History of Present Illness:  Jason Lane is a 67 y.o. male who presents for a Subsequent Medicare Wellness Visit.    The patient presents today for a Medicare wellness visit. He is accompanied by an adult female.     The patient states he has a cough that comes and goes. He denies any fever, shortness of breath, or swelling. The patient reports he has gained weight. He states he does not get the influenza or pneumonia vaccines. The patient reports he does not want the shingles vaccine. He states he does not smoke currently but he does have a history of smoking. The patient reports he has had an ultrasound of his aorta in the past. He states he does not see a urologist. The patient denies any  trouble with prostate cancer. The patient denies any blood loss in his stool or blood in his urine. The adult female confirms anemia the patient does have a family history of anemia. He states he does not have a living will or an advanced directive. The patient reports he does not take aspirin anymore. He states he is staying active. The patient denies any balance issues.  The patient reports he has glaucoma and sees an eye doctor every 4 to 6 months.      The following portions of the patient's history were reviewed and   updated as appropriate: allergies, current medications, past family history, past social history, past surgical history and problem list.    Compared to one year ago, the patient feels his physical   health is the same.    Compared to one year ago, the patient feels his mental   health is the same.    Recent Hospitalizations:  He was not admitted to the hospital during the last year.       Current Medical Providers:  Patient Care Team:  Anali Weinberg PA-C as PCP - General (Family Medicine)  Hernan Fernandez  WILLIAM PEREZ as Physician Assistant (Cardiology)  Jeffrey Goff MD as Consulting Physician (Cardiac Electrophysiology)    Outpatient Medications Prior to Visit   Medication Sig Dispense Refill   • amiodarone (PACERONE) 200 MG tablet Take 1 tablet by mouth 2 (Two) Times a Day for 10 days, THEN 1 tablet Daily for 360 days. 90 tablet 6   • apixaban (ELIQUIS) 5 MG tablet tablet Take 1 tablet by mouth Every 12 (Twelve) Hours. 60 tablet 5   • azelastine (ASTEPRO) 0.15 % solution nasal spray USE 1 SPRAY NASALLY TWICE DAILY AS DIRECTED BY PROVIDER 60 mL 1   • carvedilol (COREG) 6.25 MG tablet Take 1 tablet by mouth 2 (Two) Times a Day. 60 tablet 11   • dorzolamide (TRUSOPT) 2 % ophthalmic solution Administer 1 drop to both eyes 2 (Two) Times a Day.     • fluticasone (FLONASE) 50 MCG/ACT nasal spray USE 2 SPRAYS NASALLY EVERY DAY  AS  DIRECTED  BY  PROVIDER 48 g 3   • latanoprost (XALATAN) 0.005 % ophthalmic solution Administer 1 drop to both eyes Every Night.     • lisinopril (PRINIVIL,ZESTRIL) 20 MG tablet Take 1 tablet by mouth Daily. 90 tablet 3   • loratadine (Claritin) 10 MG tablet Take 1 tablet by mouth Daily. FOR CONGESTION 30 tablet 5   • timolol (TIMOPTIC) 0.5 % ophthalmic solution Administer 1 drop to both eyes Daily.       No facility-administered medications prior to visit.       No opioid medication identified on active medication list. I have reviewed chart for other potential  high risk medication/s and harmful drug interactions in the elderly.          Aspirin is not on active medication list.  Aspirin use is contraindicated for this patient due to: current use of Eliquis.  .    Patient Active Problem List   Diagnosis   • Anemia   • Atrial fibrillation (HCC)   • Benign prostatic hyperplasia   • Constipation   • Reduced libido   • Hypertension   • Shoulder pain   • Seasonal allergic rhinitis   • Screening PSA (prostate specific antigen)   • Eczema   • Alcohol abuse   • Tobacco abuse   • Acute systolic  "congestive heart failure (HCC)   • Syncope   • PAF (paroxysmal atrial fibrillation) (HCC)   • Congestive heart failure (HCC)   • Skin lesions   • Chronic low back pain without sciatica   • Atrial fibrillation, persistent (HCC)   • Cardiomyopathy, dilated (HCC)     Advance Care Planning  has NO advanced directive - not interested in additional information    Review of Systems   Constitutional: Negative for fatigue and unexpected weight change.   Respiratory: Negative for cough, chest tightness and shortness of breath.    Cardiovascular: Negative for chest pain, palpitations and leg swelling.   Gastrointestinal: Negative for nausea.   Musculoskeletal: Negative.    Skin: Negative for color change and rash.   Neurological: Negative for dizziness, syncope, weakness and headaches.   Psychiatric/Behavioral: Negative.          Objective      Vitals:    12/13/22 0927   BP: 128/70   Pulse: 59   Temp: 97 °F (36.1 °C)   SpO2: 98%   Weight: 92.5 kg (204 lb)   Height: 180.3 cm (71\")   PainSc:   3     BMI Readings from Last 1 Encounters:   12/13/22 28.45 kg/m²   BMI is above normal parameters. Recommendations include: exercise counseling    Does the patient have evidence of cognitive impairment? No    Physical Exam  Vitals and nursing note reviewed.   Constitutional:       General: He is not in acute distress.     Appearance: Normal appearance. He is well-developed. He is not ill-appearing, toxic-appearing or diaphoretic.   Neck:      Vascular: No carotid bruit or JVD.   Cardiovascular:      Rate and Rhythm: Normal rate and regular rhythm.      Pulses: Normal pulses.      Heart sounds: Normal heart sounds. No murmur heard.  Pulmonary:      Effort: Pulmonary effort is normal. No respiratory distress.      Breath sounds: Normal breath sounds.   Abdominal:      Palpations: Abdomen is soft.      Tenderness: There is no abdominal tenderness.   Skin:     General: Skin is warm and dry.   Neurological:      Mental Status: He is alert and " oriented to person, place, and time.      Gait: Gait normal.   Psychiatric:         Mood and Affect: Mood normal.         Behavior: Behavior normal.                 HEALTH RISK ASSESSMENT    Smoking Status:  Social History     Tobacco Use   Smoking Status Former   • Types: Cigarettes   • Quit date: 1/1/2012   • Years since quitting: 10.9   Smokeless Tobacco Never     Alcohol Consumption:  Social History     Substance and Sexual Activity   Alcohol Use Yes   • Alcohol/week: 1.0 - 6.0 standard drink   • Types: 1 - 6 Cans of beer per week    Comment: daily     Fall Risk Screen:    STEADI Fall Risk Assessment was completed, and patient is at LOW risk for falls.Assessment completed on:12/13/2022    Depression Screening:  PHQ-2/PHQ-9 Depression Screening 5/13/2022   Retired PHQ-9 Total Score -   Retired Total Score -   Little Interest or Pleasure in Doing Things 0-->not at all   Feeling Down, Depressed or Hopeless 0-->not at all   PHQ-9: Brief Depression Severity Measure Score 0       Health Habits and Functional and Cognitive Screening:  Functional & Cognitive Status 12/13/2022   Do you have difficulty preparing food and eating? No   Do you have difficulty bathing yourself, getting dressed or grooming yourself? No   Do you have difficulty using the toilet? No   Do you have difficulty moving around from place to place? No   Do you have trouble with steps or getting out of a bed or a chair? No   Current Diet Well Balanced Diet   Dental Exam Not up to date   Eye Exam Up to date   Exercise (times per week) 0 times per week   Current Exercises Include No Regular Exercise   Do you need help using the phone?  No   Are you deaf or do you have serious difficulty hearing?  Yes   Do you need help with transportation? No   Do you need help shopping? No   Do you need help preparing meals?  No   Do you need help with housework?  No   Do you need help with laundry? No   Do you need help taking your medications? No   Do you need help  managing money? No   Do you ever drive or ride in a car without wearing a seat belt? No   Have you felt unusual stress, anger or loneliness in the last month? No   Who do you live with? Spouse   If you need help, do you have trouble finding someone available to you? No   Have you been bothered in the last four weeks by sexual problems? No   Do you have difficulty concentrating, remembering or making decisions? No       Age-appropriate Screening Schedule:  Refer to the list below for future screening recommendations based on patient's age, sex and/or medical conditions. Orders for these recommended tests are listed in the plan section. The patient has been provided with a written plan.    Health Maintenance   Topic Date Due   • TDAP/TD VACCINES (1 - Tdap) 12/08/2027 (Originally 11/8/1974)   • INFLUENZA VACCINE  Discontinued   • ZOSTER VACCINE  Discontinued              Assessment & Plan     CMS Preventative Services Quick Reference  Risk Factors Identified During Encounter  Glaucoma or Family History of Glaucoma:  Condition Stable with Current Medications  The above risks/problems have been discussed with the patient.  Follow up actions/plans if indicated are seen below in the Assessment/Plan Section.  Pertinent information has been shared with the patient in the After Visit Summary.    Diagnoses and all orders for this visit:    1. Medicare annual wellness visit, subsequent (Primary)    2. Prostate cancer screening  -     PSA Screen; Future    3. Primary hypertension  -     Lipid Panel; Future    4. Anemia, unspecified type  -     CBC (No Diff); Future  -     Iron Profile; Future  -     Vitamin B12; Future  -     Folate; Future    Preventative Healthcare:   The patient is due for a lipid panel, prostate stimulating hormone, and blood count.  He is due for a tetanus vaccine, which he declines today.   He is due for a shingles vaccine, which he declines today.   The patient will have his iron and vitamin B12 levels  checked.    The patient is advised to follow up as needed.     Follow Up:  No follow-ups on file.     An After Visit Summary and PPPS were given to the patient.         Transcribed from ambient dictation for Anali Weinberg PA-C by Silvina Evans  12/13/22   11:31 EST    Patient or patient representative verbalized consent to the visit recording.  I have personally performed the services described in this document as transcribed by the above individual, and it is both accurate and complete.  Anali Weinberg PA-C  12/14/2022  10:36 EST

## 2022-12-27 ENCOUNTER — LAB (OUTPATIENT)
Dept: LAB | Facility: HOSPITAL | Age: 67
End: 2022-12-27

## 2022-12-27 DIAGNOSIS — I10 PRIMARY HYPERTENSION: ICD-10-CM

## 2022-12-27 DIAGNOSIS — Z12.5 PROSTATE CANCER SCREENING: ICD-10-CM

## 2022-12-27 DIAGNOSIS — D64.9 ANEMIA, UNSPECIFIED TYPE: ICD-10-CM

## 2022-12-27 LAB
CHOLEST SERPL-MCNC: 203 MG/DL (ref 0–200)
DEPRECATED RDW RBC AUTO: 45.4 FL (ref 37–54)
ERYTHROCYTE [DISTWIDTH] IN BLOOD BY AUTOMATED COUNT: 13.3 % (ref 12.3–15.4)
HCT VFR BLD AUTO: 35.8 % (ref 37.5–51)
HDLC SERPL-MCNC: 42 MG/DL (ref 40–60)
HGB BLD-MCNC: 11.8 G/DL (ref 13–17.7)
IRON 24H UR-MRATE: 93 MCG/DL (ref 59–158)
IRON SATN MFR SERPL: 28 % (ref 20–50)
LDLC SERPL CALC-MCNC: 140 MG/DL (ref 0–100)
LDLC/HDLC SERPL: 3.28 {RATIO}
MCH RBC QN AUTO: 30.5 PG (ref 26.6–33)
MCHC RBC AUTO-ENTMCNC: 33 G/DL (ref 31.5–35.7)
MCV RBC AUTO: 92.5 FL (ref 79–97)
PLATELET # BLD AUTO: 269 10*3/MM3 (ref 140–450)
PMV BLD AUTO: 9.6 FL (ref 6–12)
PSA SERPL-MCNC: 0.9 NG/ML (ref 0–4)
RBC # BLD AUTO: 3.87 10*6/MM3 (ref 4.14–5.8)
TIBC SERPL-MCNC: 337 MCG/DL (ref 298–536)
TRANSFERRIN SERPL-MCNC: 226 MG/DL (ref 200–360)
TRIGL SERPL-MCNC: 117 MG/DL (ref 0–150)
VLDLC SERPL-MCNC: 21 MG/DL (ref 5–40)
WBC NRBC COR # BLD: 6.83 10*3/MM3 (ref 3.4–10.8)

## 2022-12-27 PROCEDURE — 85027 COMPLETE CBC AUTOMATED: CPT

## 2022-12-27 PROCEDURE — 83540 ASSAY OF IRON: CPT

## 2022-12-27 PROCEDURE — 80061 LIPID PANEL: CPT

## 2022-12-27 PROCEDURE — 84466 ASSAY OF TRANSFERRIN: CPT

## 2022-12-27 PROCEDURE — 82746 ASSAY OF FOLIC ACID SERUM: CPT | Performed by: PHYSICIAN ASSISTANT

## 2022-12-27 PROCEDURE — 82607 VITAMIN B-12: CPT | Performed by: PHYSICIAN ASSISTANT

## 2022-12-27 PROCEDURE — G0103 PSA SCREENING: HCPCS

## 2023-01-11 ENCOUNTER — TELEPHONE (OUTPATIENT)
Dept: CARDIOLOGY | Facility: CLINIC | Age: 68
End: 2023-01-11
Payer: MEDICARE

## 2023-01-11 NOTE — TELEPHONE ENCOUNTER
----- Message from Jason Lane sent at 1/11/2023  8:53 AM EST -----  Regarding: Eliquis  Contact: [phone]  I am out of this medication and the pharmacy states it needs a renewal from you guys- can you get this renewed.  Any chance you guys have any more samples I can send my daughter to ?    Thanks

## 2023-01-11 NOTE — TELEPHONE ENCOUNTER
I spoke with the patient's wife and she would like me to send a new RX for Eliquis 5 mg BID to [facility]. I am also going to mail him a patient assistance application for Eliquis.

## 2023-01-12 ENCOUNTER — OFFICE VISIT (OUTPATIENT)
Dept: ORTHOPEDIC SURGERY | Facility: CLINIC | Age: 68
End: 2023-01-12
Payer: MEDICARE

## 2023-01-12 VITALS
DIASTOLIC BLOOD PRESSURE: 82 MMHG | HEIGHT: 71 IN | BODY MASS INDEX: 28.55 KG/M2 | SYSTOLIC BLOOD PRESSURE: 122 MMHG | WEIGHT: 203.93 LBS

## 2023-01-12 DIAGNOSIS — M77.11 RIGHT LATERAL EPICONDYLITIS: ICD-10-CM

## 2023-01-12 DIAGNOSIS — M77.8 EXTENSOR CARPI ULNARIS TENDINITIS: ICD-10-CM

## 2023-01-12 DIAGNOSIS — M25.531 RIGHT WRIST PAIN: Primary | ICD-10-CM

## 2023-01-12 DIAGNOSIS — M19.131 POST-TRAUMATIC OSTEOARTHRITIS OF RIGHT WRIST: ICD-10-CM

## 2023-01-12 PROCEDURE — 99213 OFFICE O/P EST LOW 20 MIN: CPT | Performed by: STUDENT IN AN ORGANIZED HEALTH CARE EDUCATION/TRAINING PROGRAM

## 2023-01-12 NOTE — PROGRESS NOTES
[facility] Follow Up Visit     Office Follow Up      Date: 01/12/2023   Patient Name: Jason Lane  MRN: [id number]  YOB: 1955    Referring Physician: No ref. provider found     Chief Complaint:   Chief Complaint   Patient presents with   • Right Wrist - Pain     History of right wrist fracture fixation 2016   • Follow-up     6 week follow up- Right lateral epicondylitis        History of Present Illness: Jason Lane is a 67 y.o. male who is here today for follow up on right elbow pain.  At last visit, he received a corticosteroid injection into the lateral epicondyle for tennis elbow.  His symptoms have greatly improved.  He has good range of motion in flexion extension as well as pronation supination.  No pain with movement.  No strength deficits.  He has been wearing the strap brace.    He also presents with new problem of: right wrist pain.  Onset: atraumatic and gradual in nature. The issue has been ongoing for 7 year(s). Pain is a 5/10 on the pain scale. Pain is described as dull and aching. Associated symptoms include pain. The pain is worse with certain movements; ice improve the pain. Previous treatments have included: physical therapy.    I have reviewed the following portions of the patient's history:History of Present Illness and review of systems.          Subjective   Review of Systems: Review of Systems   Constitutional: Negative for chills, fever, unexpected weight gain and unexpected weight loss.   HENT: Negative for congestion, postnasal drip and rhinorrhea.    Eyes: Negative for blurred vision.   Respiratory: Negative for shortness of breath.    Cardiovascular: Negative for leg swelling.   Gastrointestinal: Negative for abdominal pain, nausea and vomiting.   Genitourinary: Negative for difficulty urinating.   Musculoskeletal: Positive for arthralgias. Negative for gait problem, joint swelling and myalgias.   Skin:  "Negative for skin lesions and wound.   Neurological: Negative for dizziness, weakness, light-headedness and numbness.   Hematological: Does not bruise/bleed easily.   Psychiatric/Behavioral: Negative for depressed mood.        Medications:   Current Outpatient Medications:   •  amiodarone (PACERONE) 200 MG tablet, Take 1 tablet by mouth 2 (Two) Times a Day for 10 days, THEN 1 tablet Daily for 360 days., Disp: 90 tablet, Rfl: 6  •  apixaban (ELIQUIS) 5 MG tablet tablet, Take 1 tablet by mouth Every 12 (Twelve) Hours., Disp: 60 tablet, Rfl: 6  •  azelastine (ASTEPRO) 0.15 % solution nasal spray, USE 1 SPRAY NASALLY TWICE DAILY AS DIRECTED BY PROVIDER, Disp: 60 mL, Rfl: 1  •  carvedilol (COREG) 6.25 MG tablet, Take 1 tablet by mouth 2 (Two) Times a Day., Disp: 60 tablet, Rfl: 11  •  dorzolamide (TRUSOPT) 2 % ophthalmic solution, Administer 1 drop to both eyes 2 (Two) Times a Day., Disp: , Rfl:   •  fluticasone (FLONASE) 50 MCG/ACT nasal spray, USE 1 TO 2 SPRAYS IN EACH NOSTRIL EVERY DAY, Disp: 48 g, Rfl: 3  •  latanoprost (XALATAN) 0.005 % ophthalmic solution, Administer 1 drop to both eyes Every Night., Disp: , Rfl:   •  lisinopril (PRINIVIL,ZESTRIL) 20 MG tablet, Take 1 tablet by mouth Daily., Disp: 90 tablet, Rfl: 3  •  loratadine (Claritin) 10 MG tablet, Take 1 tablet by mouth Daily. FOR CONGESTION, Disp: 30 tablet, Rfl: 5  •  timolol (TIMOPTIC) 0.5 % ophthalmic solution, Administer 1 drop to both eyes Daily., Disp: , Rfl:     Allergies: No Known Allergies    I have reviewed and updated the patient's chief complaint, history of present illness, review of systems, past medical history, surgical history, family history, social history, medications and allergy list as appropriate.     Objective    Vital Signs:   Vitals:    01/12/23 1123   BP: 122/82   Weight: 92.5 kg (203 lb 14.8 oz)   Height: 180.3 cm (70.98\")     Body mass index is 28.45 kg/m².  BMI is >= 25 and <30. (Overweight) The following options were offered " after discussion;: exercise counseling/recommendations and nutrition counseling/recommendations    Patient reports that he is a former smoker.  He quit smoking in 2012.  He has not resumed smoking since that time.  This behavior was applauded and she was encouraged to continue in smoking cessation.  We will continue to monitor at subsequent visits.    Ortho Exam:  Constitutional: General Appearance: healthy-appearing, NAD, and normal body habitus.   Psychiatric: Mood and Affect: normal mood and affect and active and alert.   Cardiovascular System: Arterial Pulses Right: radial pulse normal and ulnar pulse normal. Edema Right: none. Varicosities Right: no varicosities and capillary refill test normal.   Right elbow: No erythema ecchymosis swelling.  No tenderness palpation at the medial or lateral epicondyle.  Full range of motion flexion extension.  No pain with pronation or supination.  5/5 strength.  Wrists: Inspection Right: no erythema, induration, swelling, warmth, or mass and normal wrist appearance. Palpation of the Radial Aspect Right: no tenderness of the distal forearm, the radial styloid process, the navicular, the trapezoid, the tubercle of radius, the capitate, the first metacarpal, the abductor tendon, the extensor tendon, the flexor carpi radialis, or the palmaris longus. Palpation of the Ulnar Aspect Right: no tenderness of the lunate, the triquetral, the lunotriquetral joint, the hook of hamate, the pisotriquetral joint, the pisiform, the flexor carpi ulnaris, or the canal of guyon and tenderness of the ulnar styloid process and the carpi ulnaris extensor tendon. Active Range of Motion Right: flexion 45 degrees, extension 20 degrees, pronation normal, supination normal, radial motion normal, thumb motion normal, and ulnar motion (5 deg.). Strength Right: extension 5/5, flexion 5/5, pronation 5/5, supination 5/5, radial deviation 5/5, thumb 5/5,  5/5, interossei 5/5, and ulnar deviation 4/5.  Stability Right: [place]-Littler test negative, lunotriquetral ballottement test negative, Hammond's scaphoid shift test negative, and pivot shift test of midcarpal joint negative.   Skin: Right Upper Extremity: normal.   Neurological System: Sensation on the Right: normal distal extremities. Special Tests on the Right: TFCC grind test negative and ECU subluxation test negative.      Results Review:   Imaging Results (Last 24 Hours)     Procedure Component Value Units Date/Time    XR Wrist 3+ View Right [[id number]] Resulted: 01/12/23 1156     Updated: 01/12/23 1158    Narrative:      Indication: Right wrist pain    Views: AP oblique and lateral views of the wrist are submitted.     Impression: Alignment is normal. There are no acute findings. There is no   fracture subluxation or dislocation.  Hardware in place from previous   distal radius fracture.  He has posttraumatic osteoarthritic changes in   the radiocarpal joint.    Comparison: Compared to images from 2015 when he suffered distal radius   fracture.  Arthritis has progressed significantly.           Procedures    Assessment / Plan    Assessment/Plan:   Diagnoses and all orders for this visit:    1. Right wrist pain (Primary)  -     XR Wrist 3+ View Right    2. Post-traumatic osteoarthritis of right wrist    3. Extensor carpi ulnaris tendinitis  -     Ambulatory Referral to Physical Therapy Evaluate and treat, Ortho; Electrotherapy; E-stim, Iontophoresis; Soft Tissue Mobilizaton; Stretching, ROM, Strengthening    4. Right lateral epicondylitis      Follow-up right tennis elbow.  Great response with strap brace and corticosteroid injection at last visit.  No deficits today.  Encouraged him to continue wearing strap brace on an as-needed basis.  Follow-up as needed for this condition    Also evaluated today for right wrist pain.  He has a history of traumatic fall with comminuted distal radius fracture.  His radiographs today show hardware intact from that  procedure but he has now developed posttraumatic osteoarthritis as a result.  He has limited range of motion in flexion and extension of the wrist.  However, symptoms are more painful on the ulnar side.  He is tender through the ECU with reduced and painful range of motion and radial and ulnar deviation.  We discussed the findings as well as multiple treatment options.  Given his history of atrial fibrillation as well as his chronic anticoagulation with Eliquis, we would hold off on any anti-inflammatory medication.  We will refer him to physical therapy.  He should monitor his symptoms and follow-up on an as-needed basis.    Follow Up:   Return if symptoms worsen or fail to improve.      Vic Lagunas MD  [facility]

## 2023-05-17 ENCOUNTER — OFFICE VISIT (OUTPATIENT)
Dept: CARDIOLOGY | Facility: CLINIC | Age: 68
End: 2023-05-17
Payer: MEDICARE

## 2023-05-17 VITALS
OXYGEN SATURATION: 99 % | DIASTOLIC BLOOD PRESSURE: 90 MMHG | WEIGHT: 200 LBS | HEIGHT: 71 IN | HEART RATE: 58 BPM | SYSTOLIC BLOOD PRESSURE: 160 MMHG | BODY MASS INDEX: 28 KG/M2

## 2023-05-17 DIAGNOSIS — I50.22 CHRONIC SYSTOLIC CONGESTIVE HEART FAILURE: ICD-10-CM

## 2023-05-17 DIAGNOSIS — I48.19 ATRIAL FIBRILLATION, PERSISTENT: Primary | ICD-10-CM

## 2023-05-17 DIAGNOSIS — I10 PRIMARY HYPERTENSION: ICD-10-CM

## 2023-05-17 DIAGNOSIS — I48.0 PAROXYSMAL ATRIAL FIBRILLATION: Primary | ICD-10-CM

## 2023-05-17 DIAGNOSIS — F10.10 ALCOHOL ABUSE: ICD-10-CM

## 2023-05-17 PROBLEM — Z79.899 LONG TERM CURRENT USE OF ANTIARRHYTHMIC DRUG: Status: ACTIVE | Noted: 2023-05-17

## 2023-05-17 PROBLEM — I42.0 CARDIOMYOPATHY, DILATED: Status: ACTIVE | Noted: 2023-05-17

## 2023-05-17 PROBLEM — Z79.01 CHRONIC ANTICOAGULATION: Status: ACTIVE | Noted: 2023-05-17

## 2023-05-17 RX ORDER — BRIMONIDINE TARTRATE 2 MG/ML
1 SOLUTION/ DROPS OPHTHALMIC 2 TIMES DAILY
COMMUNITY
Start: 2023-05-04

## 2023-05-17 NOTE — PROGRESS NOTES
Jason KAUR Domingo  1955  [phone]    05/17/2023    [facility]     Referring Provider: No ref. provider found     Anali Weinberg PA-C  [address] [facility] [address]  [place] [id number]    Chief Complaint   Patient presents with   • Atrial Fibrillation       Problem List:      1. Atrial fibrillation:  a. Rapid ventricular response of unknown duration, hospitalized on 01/01/2012.   b. Pradaxa and sotalol initiated, 01/02/2012.   c. Echocardiogram, January 2012 with mild mitral regurgitation, ejection fraction 35% to 40%.   d. Echocardiogram, May 2012: Left ventricular ejection fraction of 45% to 50%, mild TR.   e. Echocardiogram, 12/15/2014: EF 50% to 55%. Mild-to-moderate TR.  2. Dilated cardiomyopathy new onset 5/13/2022 diagnosed by AMY guided cardioversion  1. Started on amiodarone   3. Alcohol abuse.   4. Tobacco abuse.   5. Hypertension.   6. Acute systolic congestive heart failure secondary to atrial fibrillation with rapid ventricular response.   7. Syncope:  a. Left and right heart catheterization, Dr. Larson, 03/15/2013, with normal pressures and normal coronary arteries: EF 55%  Allergies  No Known Allergies    Current Medications    Current Outpatient Medications:   •  amiodarone (PACERONE) 200 MG tablet, Take 1 tablet by mouth 2 (Two) Times a Day for 10 days, THEN 1 tablet Daily for 360 days., Disp: 90 tablet, Rfl: 6  •  apixaban (ELIQUIS) 5 MG tablet tablet, Take 1 tablet by mouth Every 12 (Twelve) Hours., Disp: 60 tablet, Rfl: 6  •  brimonidine (ALPHAGAN) 0.2 % ophthalmic solution, Administer 1 drop to both eyes 2 (Two) Times a Day., Disp: , Rfl:   •  carvedilol (COREG) 6.25 MG tablet, Take 1 tablet by mouth 2 (Two) Times a Day., Disp: 60 tablet, Rfl: 11  •  dorzolamide (TRUSOPT) 2 % ophthalmic solution, Administer 1 drop to both eyes 2 (Two) Times a Day., Disp: , Rfl:   •  latanoprost (XALATAN) 0.005 % ophthalmic solution, Administer 1 drop to both  "eyes Every Night., Disp: , Rfl:   •  lisinopril (PRINIVIL,ZESTRIL) 20 MG tablet, Take 1 tablet by mouth Daily., Disp: 90 tablet, Rfl: 3  •  timolol (TIMOPTIC) 0.5 % ophthalmic solution, Administer 1 drop to both eyes Daily., Disp: , Rfl:     History of Present Illness     Pt presents for follow up of AF/CHF/HTN. Since we last saw the pt, pt denies any AF episodes, CP, LH, and dizziness. Denies any hospitalizations, ER visits, bleeding, or TIA/CVA symptoms. Overall feels ok.  He does have chronic dyspnea on exertion which is unchanged.  States his blood pressure that he checks daily is 120/80 mmHg at home.    ROS:  General:  Denies fatigue, weight gain or loss  Cardiovascular:  Denies CP, PND, syncope, near syncope, edema or palpitations.  Pulmonary:  + JONES, No cough, or wheezing      Vitals:    05/17/23 1429   BP: 160/90   BP Location: Left arm   Patient Position: Sitting   Pulse: 58   SpO2: 99%   Weight: 90.7 kg (200 lb)   Height: 180.3 cm (71\")     Body mass index is 27.89 kg/m².  PE:  General: NAD  Neck: no JVD, no carotid bruits, no TM  Heart RRR, NL S1, S2, S4 present, no rubs, murmurs  Lungs: CTA, no wheezes, rhonchi, or rales  Abd: soft, non-tender, NL BS  Ext: No musculoskeletal deformities, no edema, cyanosis, or clubbing  Psych: normal mood and affect    Diagnostic Data:        ECG 12 Lead    Date/Time: 5/17/2023 3:24 PM  Performed by: Jeffrey Goff MD  Authorized by: Jeffrey Goff MD   Comparison: compared with previous ECG from 9/28/2022  Similar to previous ECG  Rhythm: sinus rhythm  BPM: 56                     1. Atrial fibrillation, persistent    2. Chronic systolic congestive heart failure    3. Primary hypertension    4. Alcohol abuse          Plan:    1) Persistent atrial fibrillation:  - S/p AMY guided ECV 5/13/2022-sotalol discontinued and initiation of amiodarone therapy.  He is taking 200mg daily.  He has been on amiodarone for the past year.  Long discussion with the patient today.  " I am concerned that leaving him on chronic amiodarone therapy puts him at high risk for underlying lung disease especially since he is had a history of tobacco abuse and/COPD.  At this point we will discontinue amiodarone and the patient is interested in pursuing pulmonary vein ablation in the upcoming months.  He is to check his pulse daily and to notify us if it becomes irregular or rapid.  - Continue present medications.      2) Anticoagulation: Elliquis 5mg BID     3) DCM/CHF prob due AF/RVR + ETOH.  Class I HF. Switch to lisinopril 20 mg po daily. Stay on coreg  - follow-up echocardiogram 8/16/22: EF improved 48%     4) HTN: BP at home 120/60 mmHg. Monitor at home    5) ETOH use; drinks approximately 5 beers a day     F/up in 6 months

## 2023-06-05 RX ORDER — AMIODARONE HYDROCHLORIDE 200 MG/1
TABLET ORAL
Qty: 90 TABLET | Refills: 6 | OUTPATIENT
Start: 2023-06-05 | End: 2024-06-09

## 2023-06-05 RX ORDER — CARVEDILOL 6.25 MG/1
6.25 TABLET ORAL 2 TIMES DAILY
Qty: 60 TABLET | Refills: 11 | Status: SHIPPED | OUTPATIENT
Start: 2023-06-05

## 2023-08-04 ENCOUNTER — PREP FOR SURGERY (OUTPATIENT)
Dept: OTHER | Facility: HOSPITAL | Age: 68
End: 2023-08-04
Payer: MEDICARE

## 2023-08-04 DIAGNOSIS — I48.0 PAROXYSMAL ATRIAL FIBRILLATION: Primary | ICD-10-CM

## 2023-08-04 RX ORDER — SODIUM CHLORIDE 0.9 % (FLUSH) 0.9 %
3 SYRINGE (ML) INJECTION EVERY 12 HOURS SCHEDULED
OUTPATIENT
Start: 2023-08-04

## 2023-08-04 RX ORDER — ACETAMINOPHEN 325 MG/1
650 TABLET ORAL EVERY 4 HOURS PRN
OUTPATIENT
Start: 2023-08-04

## 2023-08-04 RX ORDER — SODIUM CHLORIDE 9 MG/ML
1 INJECTION, SOLUTION INTRAVENOUS CONTINUOUS
OUTPATIENT
Start: 2023-08-04 | End: 2023-08-04

## 2023-08-04 RX ORDER — NITROGLYCERIN 0.4 MG/1
0.4 TABLET SUBLINGUAL
OUTPATIENT
Start: 2023-08-04

## 2023-08-04 RX ORDER — SODIUM CHLORIDE 9 MG/ML
40 INJECTION, SOLUTION INTRAVENOUS AS NEEDED
OUTPATIENT
Start: 2023-08-04

## 2023-08-04 RX ORDER — SODIUM CHLORIDE 0.9 % (FLUSH) 0.9 %
10 SYRINGE (ML) INJECTION AS NEEDED
OUTPATIENT
Start: 2023-08-04

## 2023-08-08 ENCOUNTER — HOSPITAL ENCOUNTER (OUTPATIENT)
Dept: CT IMAGING | Facility: HOSPITAL | Age: 68
Discharge: HOME | End: 2023-08-08
Payer: MEDICARE

## 2023-08-08 ENCOUNTER — PRE-ADMISSION TESTING (OUTPATIENT)
Dept: PREADMISSION TESTING | Facility: HOSPITAL | Age: 68
End: 2023-08-08
Payer: MEDICARE

## 2023-08-08 DIAGNOSIS — I48.0 PAROXYSMAL ATRIAL FIBRILLATION: ICD-10-CM

## 2023-08-08 LAB
ANION GAP SERPL CALCULATED.3IONS-SCNC: 8 MMOL/L (ref 5–15)
BUN SERPL-MCNC: 15 MG/DL (ref 8–23)
BUN/CREAT SERPL: 12.1 (ref 7–25)
CALCIUM SPEC-SCNC: 8.5 MG/DL (ref 8.6–10.5)
CHLORIDE SERPL-SCNC: 102 MMOL/L (ref 98–107)
CO2 SERPL-SCNC: 27 MMOL/L (ref 22–29)
CREAT SERPL-MCNC: 1.24 MG/DL (ref 0.76–1.27)
DEPRECATED RDW RBC AUTO: 49.2 FL (ref 37–54)
EGFRCR SERPLBLD CKD-EPI 2021: 64.5 ML/MIN/1.73
ERYTHROCYTE [DISTWIDTH] IN BLOOD BY AUTOMATED COUNT: 13.8 % (ref 12.3–15.4)
GLUCOSE SERPL-MCNC: 97 MG/DL (ref 65–99)
HBA1C MFR BLD: 5.1 % (ref 4.8–5.6)
HCT VFR BLD AUTO: 41 % (ref 37.5–51)
HGB BLD-MCNC: 13.1 G/DL (ref 13–17.7)
INR PPP: 1.33 (ref 0.89–1.12)
MCH RBC QN AUTO: 30.8 PG (ref 26.6–33)
MCHC RBC AUTO-ENTMCNC: 32 G/DL (ref 31.5–35.7)
MCV RBC AUTO: 96.5 FL (ref 79–97)
PLATELET # BLD AUTO: 231 10*3/MM3 (ref 140–450)
PMV BLD AUTO: 9.4 FL (ref 6–12)
POTASSIUM SERPL-SCNC: 4.5 MMOL/L (ref 3.5–5.2)
PROTHROMBIN TIME: 16.6 SECONDS (ref 12.2–14.5)
RBC # BLD AUTO: 4.25 10*6/MM3 (ref 4.14–5.8)
SODIUM SERPL-SCNC: 137 MMOL/L (ref 136–145)
WBC NRBC COR # BLD: 6.72 10*3/MM3 (ref 3.4–10.8)

## 2023-08-08 PROCEDURE — 85610 PROTHROMBIN TIME: CPT

## 2023-08-08 PROCEDURE — 83036 HEMOGLOBIN GLYCOSYLATED A1C: CPT

## 2023-08-08 PROCEDURE — 36415 COLL VENOUS BLD VENIPUNCTURE: CPT

## 2023-08-08 PROCEDURE — 71275 CT ANGIOGRAPHY CHEST: CPT

## 2023-08-08 PROCEDURE — 25510000001 IOPAMIDOL PER 1 ML: Performed by: INTERNAL MEDICINE

## 2023-08-08 PROCEDURE — 85027 COMPLETE CBC AUTOMATED: CPT

## 2023-08-08 PROCEDURE — 80048 BASIC METABOLIC PNL TOTAL CA: CPT

## 2023-08-08 RX ADMIN — IOPAMIDOL 80 ML: 755 INJECTION, SOLUTION INTRAVENOUS at 09:07

## 2023-08-08 NOTE — DISCHARGE INSTRUCTIONS
"Dear Patient,    Drink plenty of fluids the day before your procedure to ensure you are well hydrated, unless otherwise directed by your physician.    Do NOT eat after midnight the night before your procedure.   You may have clear liquids only up to three hours before your scheduled arrival time (Water is best, but clear liquids can also include coffee without cream or milk, fruit juice without pulp, clear broth, and clear gelatin).  During the three hour pre-procedure timeframe, NOTHING BY MOUTH (NPO).    We encourage you to drink 8 ounces of water three to four hours before your scheduled arrival time.    Take your medications as instructed by your doctor.    Following your procedure, be sure to drink plenty of fluids to continue flushing the kidneys if dye was utilized during your procedure (cardiac catheterization)    Benefits of hydrating before and after your procedure include:    -Improved hydration helps prevent potential harm to the kidneys by flushing the contrast/dye used during your procedure (if applicable)    -Lower post-procedure complications    -Improved patient comfort     Do NOT smoke after midnight the night before your procedure.    Glasses and jewelry may be worn, but dentures must be removed prior to your procedure.    Leave any items you consider valuable at home.      MORNING of your Procedure, please bring the following:     -Photo ID and insurance card(s)    -ALL medications in their ORIGINAL CONTAINERS    -Co-pay and/or deductible required by your insurance   -Copy of living will or power of  document (if not brought to Pre-Admission Testing department)   -CPAP mask and tubing, not your machine (if applicable)   -Relaxation aids (music, books, magazines)    Family members may wait in [facility] waiting area during procedure.    Need to make arrangements for transportation prior to discharge.    A handout regarding \"Heart Healthy Eating\" was provided today to encourage healthy eating " habits.

## 2023-08-08 NOTE — PAT
An arrival time for procedure was not provided during PAT visit. If patient had any questions or concerns about their arrival time, they were instructed to contact their surgeon/physician.  Additionally, if the patient referred to an arrival time that was acquired from their my chart account, patient was encouraged to verify that time with their surgeon/physician. Arrival times are NOT provided in Pre Admission Testing Department.    Pt is aware to hold Eliquis morning of procedure.     Pt had CT just before PAT.

## 2023-08-11 DIAGNOSIS — I48.0 PAROXYSMAL ATRIAL FIBRILLATION: Primary | ICD-10-CM

## 2023-08-11 NOTE — NURSING NOTE
PRE-PVA ASSESSMENT  Jason Lane 1955   [address] [place] [address]   [phone]      PCP: Anali Weinberg PA-C   Information obtained from: [x] Medical record review  [x] Patient report  Scheduled for: PVA on 8/15/23 with Dr. Goff  No Known Allergies    AFib Specific History:  AFIBTYPE: paroxysmal    CHADS-VASc Risk Assessment              3 Total Score    1 CHF    1 Hypertension    1 Age 65-74        Criteria that do not apply:    Age >/= 75    DM    PRIOR STROKE/TIA/THROMBO    Vascular Disease    Sex: Female            Anticoagulation: Eliquis 5 mg bid NO MISSED DOSES   Cardioversion x 1  Failed AAD(s): amiodarone   Prior Ablation: No     Is Mr. Lane aware of his AFib? Yes   Onset: 2012      Exacerbations: none   Frequency: last episode 5/2022 per pt  Alleviations: ECV    Duration: days     Symptoms:   [] Palpitations:    [] Chest Discomfort:    [] Dizziness:    [] Presyncope:    [] Lightheadedness:   [] Syncope:    [x] Fatigue:    [x] Other: weak   [] Short of Breath:     Last Echo(s):  [x] TTE Date: 8/16/22         Left ventricular diastolic function is consistent with (grade I) impaired relaxation.  The right ventricular cavity is mildly dilated. Borderline low right ventricular systolic function noted.  Estimated right ventricular systolic pressure from tricuspid regurgitation is normal (<35 mmHg).         LA: 3.8 cm            [x] AMY Date: 5/13/22  Left ventricular ejection fraction appears to be less than 20%. Left ventricular systolic function is severely decreased.  Moderate to severe mitral valve regurgitation is present.  Estimated right ventricular systolic pressure from tricuspid regurgitation is mildly elevated (35-45 mmHg).  Left atrial volume is severely increased.  No evidence of intracardiac mass or thrombus, normal left atrial appendage.     Past medical History:   [] Diabetes   No                Hemoglobin A1C   Date Value Ref Range Status   08/08/2023  5.10 4.80 - 5.60 % Final   09/16/2015 5.5 4.00 - 6.00 % Final     Comment:     The American Diabetes Association recommends maintenance of Hemoglobin  A1C at 7.0% or lower. Goals for Hemoglobin A1C reduction may need to be  modified if hypoglycemia is a problem.            [] HYPOthyroidism No  [] HYPERthyroidism No          TSH   Date Value Ref Range Status   08/16/2022 1.740 0.450 - 4.500 uIU/mL Final   04/27/2022 0.812 0.270 - 4.200 uIU/mL Final     [x] HTN        [x] Tx lisinopril        [x] Controlled on meds     [x] CM / SHF [] CVA     No                           [] TIA     No        [] Ischemic         [] Hemorrhagic         [] Nonischemic         [] Embolic        [] Diastolic    [] CAD     No   [] MI  No         [] Dyslipidemia No   [] Statin indicated    [x] Ischemic Evaluation       [] Stress Test: No        [x] Heart Cath: Left and right heart catheterization, Dr. Larson, 03/15/2013, with normal pressures and normal coronary arteries: EF 55%     [] Sleep Apnea Suspected No     [] Obesity No BMI 27.42    [] Depression  No  [] Anxiety No                [] Urologic History No        [] Urologic cancer surgery         [] Severe decrease in flow of urine stream        [] Recent unexplained gross hematuria       [] Hx urethral stricture     Social / Lifestyle History:   [x]   Tobacco:                     [x] Former:    [x]   Alcohol:          [x] Current: 5-6 beers daily drinks    [x]   Caffeine: 1 cups per day   []   Recreational Drugs: Denies    []   Stress Issues: No    []   Exercise: none    Summary of Patient Contact:    I spoke with Mr. Lane about his upcoming PVA.   He was well informed about the procedure from prior discussion with Dr. Goff and from reading the provided literature.  We discussed the procedure at length including risks, anesthesia, intra-op procedures, recovery, bedrest, sheath removal, discharge criteria, normal post-procedure expectations, and success rates.  I answered a few  remaining questions. Mr. Lane verbalized understanding and he is ready to proceed.       Caitlin Dillon RN

## 2023-08-14 DIAGNOSIS — I48.0 PAROXYSMAL ATRIAL FIBRILLATION: Primary | ICD-10-CM

## 2023-08-14 LAB — CREAT BLDA-MCNC: 1.4 MG/DL (ref 0.6–1.3)

## 2023-08-15 ENCOUNTER — ANESTHESIA EVENT (OUTPATIENT)
Dept: CARDIOLOGY | Facility: HOSPITAL | Age: 68
End: 2023-08-15
Payer: MEDICARE

## 2023-08-15 ENCOUNTER — HOSPITAL ENCOUNTER (OUTPATIENT)
Facility: HOSPITAL | Age: 68
Discharge: HOME | End: 2023-08-15
Attending: INTERNAL MEDICINE | Admitting: INTERNAL MEDICINE
Payer: MEDICARE

## 2023-08-15 ENCOUNTER — ANESTHESIA (OUTPATIENT)
Dept: CARDIOLOGY | Facility: HOSPITAL | Age: 68
End: 2023-08-15
Payer: MEDICARE

## 2023-08-15 VITALS
TEMPERATURE: 97.1 F | HEART RATE: 74 BPM | SYSTOLIC BLOOD PRESSURE: 145 MMHG | HEIGHT: 71 IN | BODY MASS INDEX: 27.89 KG/M2 | DIASTOLIC BLOOD PRESSURE: 107 MMHG | OXYGEN SATURATION: 97 % | RESPIRATION RATE: 16 BRPM

## 2023-08-15 DIAGNOSIS — I48.0 PAROXYSMAL ATRIAL FIBRILLATION: ICD-10-CM

## 2023-08-15 LAB
ACT BLD: 131 SECONDS (ref 82–152)
ACT BLD: 389 SECONDS (ref 82–152)
ACT BLD: 407 SECONDS (ref 82–152)
ACT BLD: 432 SECONDS (ref 82–152)
ACT BLD: 438 SECONDS (ref 82–152)
ANION GAP SERPL CALCULATED.3IONS-SCNC: 9 MMOL/L (ref 5–15)
BUN SERPL-MCNC: 14 MG/DL (ref 8–23)
BUN/CREAT SERPL: 11 (ref 7–25)
CALCIUM SPEC-SCNC: 8.8 MG/DL (ref 8.6–10.5)
CHLORIDE SERPL-SCNC: 102 MMOL/L (ref 98–107)
CO2 SERPL-SCNC: 26 MMOL/L (ref 22–29)
CREAT SERPL-MCNC: 1.27 MG/DL (ref 0.76–1.27)
EGFRCR SERPLBLD CKD-EPI 2021: 62.7 ML/MIN/1.73
GLUCOSE SERPL-MCNC: 90 MG/DL (ref 65–99)
POTASSIUM SERPL-SCNC: 4.3 MMOL/L (ref 3.5–5.2)
SODIUM SERPL-SCNC: 137 MMOL/L (ref 136–145)
TSH SERPL DL<=0.05 MIU/L-ACNC: 0.85 UIU/ML (ref 0.27–4.2)

## 2023-08-15 PROCEDURE — 25010000002 ONDANSETRON PER 1 MG

## 2023-08-15 PROCEDURE — C1894 INTRO/SHEATH, NON-LASER: HCPCS | Performed by: INTERNAL MEDICINE

## 2023-08-15 PROCEDURE — 25010000002 KETOROLAC TROMETHAMINE PER 15 MG: Performed by: INTERNAL MEDICINE

## 2023-08-15 PROCEDURE — C1732 CATH, EP, DIAG/ABL, 3D/VECT: HCPCS | Performed by: INTERNAL MEDICINE

## 2023-08-15 PROCEDURE — 25010000002 HYDRALAZINE PER 20 MG: Performed by: PHYSICIAN ASSISTANT

## 2023-08-15 PROCEDURE — 25010000002 PHENYLEPHRINE 10 MG/ML SOLUTION 1 ML VIAL

## 2023-08-15 PROCEDURE — 25010000002 FENTANYL CITRATE (PF) 50 MCG/ML SOLUTION

## 2023-08-15 PROCEDURE — 93623 PRGRMD STIMJ&PACG IV RX NFS: CPT | Performed by: INTERNAL MEDICINE

## 2023-08-15 PROCEDURE — 25010000002 SUGAMMADEX 200 MG/2ML SOLUTION

## 2023-08-15 PROCEDURE — 25010000002 DEXAMETHASONE PER 1 MG

## 2023-08-15 PROCEDURE — 93657 TX L/R ATRIAL FIB ADDL: CPT | Performed by: INTERNAL MEDICINE

## 2023-08-15 PROCEDURE — 0 LIDOCAINE 1 % SOLUTION: Performed by: INTERNAL MEDICINE

## 2023-08-15 PROCEDURE — 85347 COAGULATION TIME ACTIVATED: CPT

## 2023-08-15 PROCEDURE — C1730 CATH, EP, 19 OR FEW ELECT: HCPCS | Performed by: INTERNAL MEDICINE

## 2023-08-15 PROCEDURE — 0 LIDOCAINE 1 % SOLUTION

## 2023-08-15 PROCEDURE — 25010000002 FUROSEMIDE PER 20 MG: Performed by: INTERNAL MEDICINE

## 2023-08-15 PROCEDURE — C1766 INTRO/SHEATH,STRBLE,NON-PEEL: HCPCS | Performed by: INTERNAL MEDICINE

## 2023-08-15 PROCEDURE — 93656 COMPRE EP EVAL ABLTJ ATR FIB: CPT | Performed by: INTERNAL MEDICINE

## 2023-08-15 PROCEDURE — C1759 CATH, INTRA ECHOCARDIOGRAPHY: HCPCS | Performed by: INTERNAL MEDICINE

## 2023-08-15 PROCEDURE — 25010000002 PROTAMINE SULFATE PER 10 MG: Performed by: INTERNAL MEDICINE

## 2023-08-15 PROCEDURE — 25010000002 PROPOFOL 10 MG/ML EMULSION

## 2023-08-15 PROCEDURE — C1760 CLOSURE DEV, VASC: HCPCS | Performed by: INTERNAL MEDICINE

## 2023-08-15 PROCEDURE — 25010000002 HEPARIN (PORCINE) PER 1000 UNITS: Performed by: INTERNAL MEDICINE

## 2023-08-15 PROCEDURE — C1893 INTRO/SHEATH, FIXED,NON-PEEL: HCPCS | Performed by: INTERNAL MEDICINE

## 2023-08-15 PROCEDURE — 80048 BASIC METABOLIC PNL TOTAL CA: CPT | Performed by: PHYSICIAN ASSISTANT

## 2023-08-15 PROCEDURE — 84443 ASSAY THYROID STIM HORMONE: CPT | Performed by: PHYSICIAN ASSISTANT

## 2023-08-15 PROCEDURE — 25010000002 PHENYLEPHRINE 10 MG/ML SOLUTION

## 2023-08-15 RX ORDER — ONDANSETRON 2 MG/ML
4 INJECTION INTRAMUSCULAR; INTRAVENOUS ONCE AS NEEDED
Status: DISCONTINUED | OUTPATIENT
Start: 2023-08-15 | End: 2023-08-15 | Stop reason: HOSPADM

## 2023-08-15 RX ORDER — SUCRALFATE 1 G/1
1 TABLET ORAL
Status: DISCONTINUED | OUTPATIENT
Start: 2023-08-15 | End: 2023-08-15 | Stop reason: HOSPADM

## 2023-08-15 RX ORDER — PROTAMINE SULFATE 10 MG/ML
INJECTION, SOLUTION INTRAVENOUS
Status: DISCONTINUED | OUTPATIENT
Start: 2023-08-15 | End: 2023-08-15 | Stop reason: HOSPADM

## 2023-08-15 RX ORDER — SODIUM CHLORIDE 0.9 % (FLUSH) 0.9 %
3 SYRINGE (ML) INJECTION EVERY 12 HOURS SCHEDULED
Status: DISCONTINUED | OUTPATIENT
Start: 2023-08-15 | End: 2023-08-15 | Stop reason: HOSPADM

## 2023-08-15 RX ORDER — DROPERIDOL 2.5 MG/ML
0.62 INJECTION, SOLUTION INTRAMUSCULAR; INTRAVENOUS ONCE AS NEEDED
Status: DISCONTINUED | OUTPATIENT
Start: 2023-08-15 | End: 2023-08-15 | Stop reason: HOSPADM

## 2023-08-15 RX ORDER — ROCURONIUM BROMIDE 10 MG/ML
INJECTION, SOLUTION INTRAVENOUS AS NEEDED
Status: DISCONTINUED | OUTPATIENT
Start: 2023-08-15 | End: 2023-08-15 | Stop reason: SURG

## 2023-08-15 RX ORDER — DEXAMETHASONE SODIUM PHOSPHATE 4 MG/ML
INJECTION, SOLUTION INTRA-ARTICULAR; INTRALESIONAL; INTRAMUSCULAR; INTRAVENOUS; SOFT TISSUE AS NEEDED
Status: DISCONTINUED | OUTPATIENT
Start: 2023-08-15 | End: 2023-08-15 | Stop reason: SURG

## 2023-08-15 RX ORDER — FUROSEMIDE 10 MG/ML
40 INJECTION INTRAMUSCULAR; INTRAVENOUS ONCE
Status: COMPLETED | OUTPATIENT
Start: 2023-08-15 | End: 2023-08-15

## 2023-08-15 RX ORDER — PROPOFOL 10 MG/ML
VIAL (ML) INTRAVENOUS AS NEEDED
Status: DISCONTINUED | OUTPATIENT
Start: 2023-08-15 | End: 2023-08-15 | Stop reason: SURG

## 2023-08-15 RX ORDER — ACETAMINOPHEN 650 MG/1
650 SUPPOSITORY RECTAL EVERY 4 HOURS PRN
Status: DISCONTINUED | OUTPATIENT
Start: 2023-08-15 | End: 2023-08-15 | Stop reason: HOSPADM

## 2023-08-15 RX ORDER — SODIUM CHLORIDE 0.9 % (FLUSH) 0.9 %
10 SYRINGE (ML) INJECTION AS NEEDED
Status: DISCONTINUED | OUTPATIENT
Start: 2023-08-15 | End: 2023-08-15 | Stop reason: HOSPADM

## 2023-08-15 RX ORDER — FENTANYL CITRATE 50 UG/ML
INJECTION, SOLUTION INTRAMUSCULAR; INTRAVENOUS AS NEEDED
Status: DISCONTINUED | OUTPATIENT
Start: 2023-08-15 | End: 2023-08-15 | Stop reason: SURG

## 2023-08-15 RX ORDER — HYDROMORPHONE HYDROCHLORIDE 1 MG/ML
0.5 INJECTION, SOLUTION INTRAMUSCULAR; INTRAVENOUS; SUBCUTANEOUS
Status: DISCONTINUED | OUTPATIENT
Start: 2023-08-15 | End: 2023-08-15 | Stop reason: HOSPADM

## 2023-08-15 RX ORDER — SODIUM CHLORIDE 9 MG/ML
INJECTION, SOLUTION INTRAVENOUS
Status: DISCONTINUED | OUTPATIENT
Start: 2023-08-15 | End: 2023-08-15 | Stop reason: HOSPADM

## 2023-08-15 RX ORDER — IPRATROPIUM BROMIDE AND ALBUTEROL SULFATE 2.5; .5 MG/3ML; MG/3ML
3 SOLUTION RESPIRATORY (INHALATION) ONCE AS NEEDED
Status: DISCONTINUED | OUTPATIENT
Start: 2023-08-15 | End: 2023-08-15 | Stop reason: HOSPADM

## 2023-08-15 RX ORDER — ONDANSETRON 2 MG/ML
INJECTION INTRAMUSCULAR; INTRAVENOUS AS NEEDED
Status: DISCONTINUED | OUTPATIENT
Start: 2023-08-15 | End: 2023-08-15 | Stop reason: SURG

## 2023-08-15 RX ORDER — HYDROCODONE BITARTRATE AND ACETAMINOPHEN 5; 325 MG/1; MG/1
1 TABLET ORAL ONCE AS NEEDED
Status: DISCONTINUED | OUTPATIENT
Start: 2023-08-15 | End: 2023-08-15 | Stop reason: HOSPADM

## 2023-08-15 RX ORDER — SODIUM CHLORIDE 9 MG/ML
1 INJECTION, SOLUTION INTRAVENOUS CONTINUOUS
Status: DISCONTINUED | OUTPATIENT
Start: 2023-08-15 | End: 2023-08-15

## 2023-08-15 RX ORDER — NITROGLYCERIN 0.4 MG/1
0.4 TABLET SUBLINGUAL
Status: DISCONTINUED | OUTPATIENT
Start: 2023-08-15 | End: 2023-08-15 | Stop reason: HOSPADM

## 2023-08-15 RX ORDER — SODIUM CHLORIDE 9 MG/ML
40 INJECTION, SOLUTION INTRAVENOUS AS NEEDED
Status: DISCONTINUED | OUTPATIENT
Start: 2023-08-15 | End: 2023-08-15 | Stop reason: HOSPADM

## 2023-08-15 RX ORDER — SUCRALFATE 1 G/1
1 TABLET ORAL
Qty: 40 TABLET | Refills: 0 | Status: SHIPPED | OUTPATIENT
Start: 2023-08-16 | End: 2023-08-26

## 2023-08-15 RX ORDER — HEPARIN SODIUM 1000 [USP'U]/ML
INJECTION, SOLUTION INTRAVENOUS; SUBCUTANEOUS
Status: DISCONTINUED | OUTPATIENT
Start: 2023-08-15 | End: 2023-08-15 | Stop reason: HOSPADM

## 2023-08-15 RX ORDER — PANTOPRAZOLE SODIUM 40 MG/1
40 TABLET, DELAYED RELEASE ORAL
Qty: 30 TABLET | Refills: 0 | Status: SHIPPED | OUTPATIENT
Start: 2023-08-16

## 2023-08-15 RX ORDER — ACETAMINOPHEN 325 MG/1
650 TABLET ORAL EVERY 4 HOURS PRN
Status: DISCONTINUED | OUTPATIENT
Start: 2023-08-15 | End: 2023-08-15 | Stop reason: HOSPADM

## 2023-08-15 RX ORDER — SODIUM CHLORIDE 0.9 % (FLUSH) 0.9 %
3-10 SYRINGE (ML) INJECTION AS NEEDED
Status: DISCONTINUED | OUTPATIENT
Start: 2023-08-15 | End: 2023-08-15 | Stop reason: HOSPADM

## 2023-08-15 RX ORDER — LIDOCAINE HYDROCHLORIDE 10 MG/ML
INJECTION, SOLUTION INFILTRATION; PERINEURAL AS NEEDED
Status: DISCONTINUED | OUTPATIENT
Start: 2023-08-15 | End: 2023-08-15 | Stop reason: SURG

## 2023-08-15 RX ORDER — HYDRALAZINE HYDROCHLORIDE 20 MG/ML
20 INJECTION INTRAMUSCULAR; INTRAVENOUS ONCE
Status: COMPLETED | OUTPATIENT
Start: 2023-08-15 | End: 2023-08-15

## 2023-08-15 RX ORDER — PHENYLEPHRINE HYDROCHLORIDE 10 MG/ML
INJECTION INTRAVENOUS AS NEEDED
Status: DISCONTINUED | OUTPATIENT
Start: 2023-08-15 | End: 2023-08-15 | Stop reason: SURG

## 2023-08-15 RX ORDER — SODIUM CHLORIDE, SODIUM LACTATE, POTASSIUM CHLORIDE, CALCIUM CHLORIDE 600; 310; 30; 20 MG/100ML; MG/100ML; MG/100ML; MG/100ML
INJECTION, SOLUTION INTRAVENOUS CONTINUOUS PRN
Status: DISCONTINUED | OUTPATIENT
Start: 2023-08-15 | End: 2023-08-15 | Stop reason: SURG

## 2023-08-15 RX ORDER — FENTANYL CITRATE 50 UG/ML
50 INJECTION, SOLUTION INTRAMUSCULAR; INTRAVENOUS
Status: DISCONTINUED | OUTPATIENT
Start: 2023-08-15 | End: 2023-08-15 | Stop reason: HOSPADM

## 2023-08-15 RX ORDER — LABETALOL HYDROCHLORIDE 5 MG/ML
5 INJECTION, SOLUTION INTRAVENOUS
Status: DISCONTINUED | OUTPATIENT
Start: 2023-08-15 | End: 2023-08-15 | Stop reason: HOSPADM

## 2023-08-15 RX ORDER — KETOROLAC TROMETHAMINE 15 MG/ML
15 INJECTION, SOLUTION INTRAMUSCULAR; INTRAVENOUS EVERY 8 HOURS
Status: DISCONTINUED | OUTPATIENT
Start: 2023-08-15 | End: 2023-08-15 | Stop reason: HOSPADM

## 2023-08-15 RX ORDER — LIDOCAINE HYDROCHLORIDE 10 MG/ML
INJECTION, SOLUTION INFILTRATION; PERINEURAL
Status: DISCONTINUED | OUTPATIENT
Start: 2023-08-15 | End: 2023-08-15 | Stop reason: HOSPADM

## 2023-08-15 RX ORDER — PANTOPRAZOLE SODIUM 40 MG/1
40 TABLET, DELAYED RELEASE ORAL
Status: DISCONTINUED | OUTPATIENT
Start: 2023-08-15 | End: 2023-08-15 | Stop reason: HOSPADM

## 2023-08-15 RX ORDER — EPHEDRINE SULFATE 50 MG/ML
INJECTION, SOLUTION INTRAVENOUS AS NEEDED
Status: DISCONTINUED | OUTPATIENT
Start: 2023-08-15 | End: 2023-08-15 | Stop reason: SURG

## 2023-08-15 RX ORDER — HYDRALAZINE HYDROCHLORIDE 20 MG/ML
5 INJECTION INTRAMUSCULAR; INTRAVENOUS
Status: DISCONTINUED | OUTPATIENT
Start: 2023-08-15 | End: 2023-08-15 | Stop reason: HOSPADM

## 2023-08-15 RX ORDER — ONDANSETRON 2 MG/ML
4 INJECTION INTRAMUSCULAR; INTRAVENOUS EVERY 6 HOURS PRN
Status: DISCONTINUED | OUTPATIENT
Start: 2023-08-15 | End: 2023-08-15 | Stop reason: HOSPADM

## 2023-08-15 RX ORDER — GLYCOPYRROLATE 0.2 MG/ML
INJECTION INTRAMUSCULAR; INTRAVENOUS AS NEEDED
Status: DISCONTINUED | OUTPATIENT
Start: 2023-08-15 | End: 2023-08-15 | Stop reason: SURG

## 2023-08-15 RX ORDER — DROPERIDOL 2.5 MG/ML
0.62 INJECTION, SOLUTION INTRAMUSCULAR; INTRAVENOUS
Status: DISCONTINUED | OUTPATIENT
Start: 2023-08-15 | End: 2023-08-15 | Stop reason: HOSPADM

## 2023-08-15 RX ADMIN — PHENYLEPHRINE HYDROCHLORIDE 10 MCG/MIN: 10 INJECTION INTRAVENOUS at 14:45

## 2023-08-15 RX ADMIN — KETOROLAC TROMETHAMINE 15 MG: 15 INJECTION, SOLUTION INTRAMUSCULAR; INTRAVENOUS at 18:27

## 2023-08-15 RX ADMIN — FENTANYL CITRATE 100 MCG: 50 INJECTION, SOLUTION INTRAMUSCULAR; INTRAVENOUS at 14:36

## 2023-08-15 RX ADMIN — SODIUM CHLORIDE, POTASSIUM CHLORIDE, SODIUM LACTATE AND CALCIUM CHLORIDE: 600; 310; 30; 20 INJECTION, SOLUTION INTRAVENOUS at 14:33

## 2023-08-15 RX ADMIN — GLYCOPYRROLATE 0.1 MG: 0.2 INJECTION INTRAMUSCULAR; INTRAVENOUS at 14:34

## 2023-08-15 RX ADMIN — ROCURONIUM BROMIDE 10 MG: 10 SOLUTION INTRAVENOUS at 16:32

## 2023-08-15 RX ADMIN — ROCURONIUM BROMIDE 50 MG: 10 SOLUTION INTRAVENOUS at 14:36

## 2023-08-15 RX ADMIN — LIDOCAINE HYDROCHLORIDE 50 MG: 10 INJECTION, SOLUTION INFILTRATION; PERINEURAL at 14:36

## 2023-08-15 RX ADMIN — ONDANSETRON 4 MG: 2 INJECTION INTRAMUSCULAR; INTRAVENOUS at 16:38

## 2023-08-15 RX ADMIN — PROPOFOL 140 MG: 10 INJECTION, EMULSION INTRAVENOUS at 14:36

## 2023-08-15 RX ADMIN — PANTOPRAZOLE SODIUM 40 MG: 40 TABLET, DELAYED RELEASE ORAL at 18:28

## 2023-08-15 RX ADMIN — HYDRALAZINE HYDROCHLORIDE 20 MG: 20 INJECTION INTRAMUSCULAR; INTRAVENOUS at 11:53

## 2023-08-15 RX ADMIN — ROCURONIUM BROMIDE 10 MG: 10 SOLUTION INTRAVENOUS at 15:18

## 2023-08-15 RX ADMIN — SUCRALFATE 1 G: 1 TABLET ORAL at 18:28

## 2023-08-15 RX ADMIN — PHENYLEPHRINE HYDROCHLORIDE 50 MCG: 10 INJECTION INTRAVENOUS at 15:08

## 2023-08-15 RX ADMIN — SUGAMMADEX 200 MG: 100 INJECTION, SOLUTION INTRAVENOUS at 16:42

## 2023-08-15 RX ADMIN — EPHEDRINE SULFATE 5 MG: 50 INJECTION INTRAVENOUS at 15:08

## 2023-08-15 RX ADMIN — DEXAMETHASONE SODIUM PHOSPHATE 4 MG: 4 INJECTION, SOLUTION INTRAMUSCULAR; INTRAVENOUS at 14:46

## 2023-08-15 RX ADMIN — FUROSEMIDE 40 MG: 10 INJECTION, SOLUTION INTRAMUSCULAR; INTRAVENOUS at 18:28

## 2023-08-15 RX ADMIN — ROCURONIUM BROMIDE 10 MG: 10 SOLUTION INTRAVENOUS at 15:56

## 2023-08-15 NOTE — ANESTHESIA PROCEDURE NOTES
Airway  Urgency: elective    Date/Time: 8/15/2023 2:39 PM  Airway not difficult    General Information and Staff    Patient location during procedure: OR  CRNA/CAA: Tato Holm CRNA    Indications and Patient Condition  Indications for airway management: airway protection    Preoxygenated: yes  MILS not maintained throughout  Mask difficulty assessment: 2 - vent by mask + OA or adjuvant +/- NMBA    Final Airway Details  Final airway type: endotracheal airway      Successful airway: ETT  Cuffed: yes   Successful intubation technique: video laryngoscopy  Endotracheal tube insertion site: oral  Blade: Carrion  Blade size: 3  ETT size (mm): 7.5  Cormack-Lehane Classification: grade I - full view of glottis  Placement verified by: chest auscultation and capnometry   Cuff volume (mL): 8  Measured from: lips  ETT/EBT  to lips (cm): 22  Number of attempts at approach: 1  Assessment: lips, teeth, and gum same as pre-op and atraumatic intubation    Additional Comments  Negative epigastric sounds, Breath sound equal bilaterally with symmetric chest rise and fall

## 2023-08-15 NOTE — ANESTHESIA POSTPROCEDURE EVALUATION
Patient: Jason Lane    Procedure Summary       Date: 08/15/23 Room / Location: [facility] / [facility]    Anesthesia Start: 1421 Anesthesia Stop: 1713    Procedure: Ablation atrial fibrillation. Hold Eliquis morning of procedure. Schedule in 3 to 4 months Diagnosis:       Paroxysmal atrial fibrillation      (afib)    Providers: Jeffrey Goff MD Provider: Lucien Garcia MD    Anesthesia Type: general ASA Status: 3            Anesthesia Type: general    Vitals  Vitals Value Taken Time   /79 08/15/23 1713   Temp 97 øF (36.1 øC) 08/15/23 1713   Pulse 63 08/15/23 1713   Resp 14 08/15/23 1713   SpO2 98 % 08/15/23 1713           Post Anesthesia Care and Evaluation    Patient location during evaluation: PACU  Patient participation: complete - patient participated  Level of consciousness: awake and alert  Pain score: 0  Pain management: adequate    Airway patency: patent  Anesthetic complications: No anesthetic complications  PONV Status: none  Cardiovascular status: hemodynamically stable and acceptable  Respiratory status: nonlabored ventilation, acceptable and nasal cannula  Hydration status: acceptable

## 2023-08-15 NOTE — Clinical Note
"  Rufina Sands Patient Age: 42year old  MESSAGE:   Patient returning call             Next and Last Visit with Provider and Department  Last visit with this provider: 3/19/2019  Last visit with this department: Visit date not found    Next visit with this provider: Visit date not found  Next visit with this department: Visit date not found    WEIGHT AND HEIGHT:   Wt Readings from Last 1 Encounters:   11/14/18 74.8 kg (165 lb)     Ht Readings from Last 1 Encounters:   11/14/18 5' 8"" (1.727 m)     BMI Readings from Last 1 Encounters:   11/14/18 25.09 kg/mÂ²       ALLERGIES:  Azithromycin and Sulfa antibiotics  Current Outpatient Medications   Medication   â¢ lisdexamfetamine (VYVANSE) 40 MG capsule   â¢ Valacyclovir HCl (VALTREX) 1000 MG Tab   â¢ Multiple Vitamins-Minerals (MULTIVITAMIN PO)   â¢ nitrofurantoin, macrocrystal-monohydrate, (MACROBID) 100 MG capsule   â¢ DULoxetine (CYMBALTA) 30 MG capsule     No current facility-administered medications for this visit. PHARMACY to use: ask patient           Pharmacy preference(s) on file:   [address] [place] [address] S. [address] [place] [address] S.  [address] [place] [address]  Phone: [phone] Fax: [phone] [facility] to leave response (including medical information) with family member or on answering machine  ROUTING: Patient's physician/staff        PCP: Danyelle Still MD         INS: Payor: [address] [place] [address] Road / Plan: PPO [id number] / Product Type: PPO MISC   PATIENT ADDRESS:  [address] [place] [address]  " EP Catheter inserted thru vizigo sheath

## 2023-08-15 NOTE — H&P
Cardiology H&P     Jason Lane  1955  [phone]  There is no work phone number on file.    08/15/23    DATE OF ADMISSION: 8/15/2023  [facility] MONO Weinberg, Anali GONZALEZ, PA-C  [address] / [facility] [id number]  Referring Provider: Jeffrey Goff MD     CC: Atrial Fibrillation     Problem List:      Persistent Atrial fibrillation:  Rapid ventricular response of unknown duration, hospitalized on 01/01/2012.   Pradaxa and sotalol initiated, 01/02/2012.   Echocardiogram, January 2012 with mild mitral regurgitation, ejection fraction 35% to 40%.   Echocardiogram, May 2012: Left ventricular ejection fraction of 45% to 50%, mild TR.   Echocardiogram, 12/15/2014: EF 50% to 55%. Mild-to-moderate TR.  Sotalol discontinued   AMY guided ECV with initiation of Amiodarone 5/13/2022  Amiodarone discontinued 5/2023  Dilated cardiomyopathy new onset 5/13/2022 diagnosed by AMY guided cardioversion  Started on amiodarone   AMY 5/13/2022: EF 48%  Alcohol abuse.   Tobacco abuse.   Hypertension.   Acute systolic congestive heart failure secondary to atrial fibrillation with rapid ventricular response.   Syncope:  Left and right heart catheterization, Dr. Larson, 03/15/2013, with normal pressures and normal coronary arteries: EF 55%      History of Present Illness:   Jason Lane is a 67-year-old male with above-stated past medical history who presents today for pulmonary vein ablation with Dr. Goff.  He has a history of persistent atrial fibrillation since 2012 and has previously been treated with sotalol.  However in 2022 he had recurrent atrial fibrillation and was started on amiodarone after AMY guided cardioversion.  There has been concern with long-term use of amiodarone due to possibility of lung toxicity especially in setting of previous tobacco abuse and possible COPD.  He has now been off amiodarone for 3 months.  He does not recall having any atrial fibrillation and does  check his heart rates regularly and uses his EKG function on his Apple watch.  He has been on Eliquis without missed doses.  He held it this morning as instructed before the procedure today.  He has been trying to cut back on his alcohol use and is drinking 5 beers daily.  He denies any recent stroke symptoms.  He does have chronic dyspnea on exertion.  Of note his CTA of his chest dated 8/8/2023 showed a small plaque like 19 x 8 mm lesion of the posterior left lower chest wall perhaps postinflammatory scarring with differential diagnosis including mild/early changes of pleural-based neoplasm with recommended follow-up CT scan in 3 months and referral to pulmonology.      No Known Allergies    Prior to Admission Medications       Prescriptions Last Dose Informant Patient Reported? Taking?    apixaban (ELIQUIS) 5 MG tablet tablet 8/14/2023 Medication Bottle No Yes    Take 1 tablet by mouth Every 12 (Twelve) Hours.    brimonidine (ALPHAGAN) 0.2 % ophthalmic solution 8/14/2023 Medication Bottle Yes Yes    Administer 1 drop to both eyes 2 (Two) Times a Day.    dorzolamide (TRUSOPT) 2 % ophthalmic solution  Medication Bottle Yes No    Administer 1 drop to both eyes 2 (Two) Times a Day.    latanoprost (XALATAN) 0.005 % ophthalmic solution 8/14/2023 Medication Bottle Yes Yes    Administer 1 drop to both eyes Every Night.    lisinopril (PRINIVIL,ZESTRIL) 20 MG tablet 8/14/2023 Medication Bottle No Yes    Take 1 tablet by mouth Daily.    timolol (TIMOPTIC) 0.5 % ophthalmic solution 8/14/2023 Medication Bottle Yes Yes    Administer 1 drop to both eyes Daily.    carvedilol (COREG) 6.25 MG tablet 8/14/2023 Medication Bottle No Yes    Take 1 tablet by mouth 2 (Two) Times a Day.              Current Facility-Administered Medications:     acetaminophen (TYLENOL) tablet 650 mg, 650 mg, Oral, Q4H PRN, Tiesha Thompson APRN    hydrALAZINE (APRESOLINE) injection 20 mg, 20 mg, Intravenous, Once, Deidre Ozuna PA     nitroglycerin (NITROSTAT) SL tablet 0.4 mg, 0.4 mg, Sublingual, Q5 Min PRN, Thompson, Tiesha Abigail, APRN    sodium chloride 0.9 % flush 10 mL, 10 mL, Intravenous, PRN, Thompson, Tiesha Abigail, APRN    sodium chloride 0.9 % flush 3 mL, 3 mL, Intravenous, Q12H, Thompson, Tiesha Abigail, APRN    sodium chloride 0.9 % infusion 40 mL, 40 mL, Intravenous, PRN, Thompson, Tiesha Abigail, APRN    sodium chloride 0.9 % infusion, 1 mL/kg/hr (Order-Specific), Intravenous, Continuous, Thompson, Tiesha Abigail, APRN    Social History     Socioeconomic History    Marital status:    Tobacco Use    Smoking status: Former     Packs/day: 1.00     Years: 35.00     Pack years: 35.00     Types: Cigarettes     Quit date: 2012     Years since quittin.6    Smokeless tobacco: Never   Vaping Use    Vaping Use: Never used   Substance and Sexual Activity    Alcohol use: Yes     Alcohol/week: 56.0 standard drinks     Types: 56 Cans of beer per week     Comment: 1-6 per day    Drug use: No    Sexual activity: Defer       Family History   Problem Relation Age of Onset    Heart disease Mother     Thyroid disease Mother     Hyperlipidemia Mother     Kidney disease Mother     Heart disease Maternal Uncle     Heart disease Paternal Uncle     Anemia Other         cousins       REVIEW OF SYSTEMS:   CONSTITUTIONAL:         No weight loss, fever, chills, weakness + fatigue.   HEENT:                            No visual loss, blurred vision, double vision, yellow sclerae.                                             No hearing loss, congestion, sore throat.   SKIN:                                No rashes, urticaria, ulcers, sores.     RESPIRATORY:               +shortness of breath,  - hemoptysis, cough, sputum.   GI:                                     No anorexia, nausea, vomiting, diarrhea. No abdominal pain, melena.   :                                   No burning on urination, hematuria or increased frequency.  ENDOCRINE:                    "No diaphoresis, cold or heat intolerance. No polyuria or polydipsia.   NEURO:                            No headache, dizziness, syncope, paralysis, ataxia, or parasthesias.                                            No change in bowel or bladder control. No history of CVA/TIA  MUSCULOSKELETAL:    No muscle, back pain, joint pain or stiffness.   HEMATOLOGY:               No anemia, bleeding, bruising. No history of DVT/PE.  PSYCH:                            No history of depression, anxiety    Vitals:    08/15/23 0923 08/15/23 0925 08/15/23 1000   BP: (!) 184/88 (!) 188/91 174/96   BP Location: Right arm Left arm    Patient Position:  Lying    Pulse: (!) 48 (!) 48 (!) 44   Temp: 97.2 øF (36.2 øC)     TempSrc: Temporal     SpO2: 97% 97% 98%   Height: 180.3 cm (71\")           Vital Sign Min/Max for last 24 hours  Temp  Min: 97.2 øF (36.2 øC)  Max: 97.2 øF (36.2 øC)   BP  Min: 174/96  Max: 188/91   Pulse  Min: 44  Max: 48   No data recorded   SpO2  Min: 97 %  Max: 98 %   No data recorded    No intake or output data in the 24 hours ending 08/15/23 1153          Physical Exam:  GEN: Well nourished, Well- developed  No acute distress  HEENT: Normocephalic, Atraumatic, PERRLA, moist mucous membranes  NECK: supple, NO JVD, no thyromegaly, no lymphadenopathy  CARDIAC: S1S2  RRR with ectopic beats,  no murmur, gallop, rub  LUNGS: Clear to ausculation, normal respiratory effort  ABDOMEN: Soft, nontender, normal bowel sounds  EXTREMITIES:No gross deformities,  No clubbing, cyanosis, or edema  SKIN: Warm, dry  NEURO: No focal deficits  PSYCHIATRIC: Normal affect and mood      I personally viewed and interpreted the patient's EKG/Telemetry/lab data    Data:       Results from last 7 days   Lab Units 08/15/23  1043   SODIUM mmol/L 137   POTASSIUM mmol/L 4.3   CHLORIDE mmol/L 102   CO2 mmol/L 26.0   BUN mg/dL 14   CREATININE mg/dL 1.27   GLUCOSE mg/dL 90          Results from last 7 days   Lab Units 08/15/23  1043   TSH uIU/mL " 0.854                         No intake or output data in the 24 hours ending 08/15/23 1153    Lab Results   Component Value Date    GLUCOSE 90 08/15/2023    CALCIUM 8.8 08/15/2023     08/15/2023    K 4.3 08/15/2023    CO2 26.0 08/15/2023     08/15/2023    BUN 14 08/15/2023    CREATININE 1.27 08/15/2023    EGFRRESULT 57 (L) 08/16/2022    EGFR 62.7 08/15/2023    BCR 11.0 08/15/2023    ANIONGAP 9.0 08/15/2023     Lab Results   Component Value Date    WBC 6.72 08/08/2023    HGB 13.1 08/08/2023    HCT 41.0 08/08/2023    MCV 96.5 08/08/2023     08/08/2023     TSH: 0.854      Telemetry: NSR with PVCs          Assessment and Plan:   Persistent atrial fibrillation:  -Patient previously failed sotalol with recurrence, has been treated with amiodarone for the past year maintaining normal sinus rhythm however there is concern of long-term use of amiodarone with potential of pulmonary toxicity especially in the setting of previous tobacco use and COPD.  Therefore he will undergo pulmonary vein ablation today with Dr. Goff. The risks, benefits, and alternatives of the procedure have been reviewed and the patient wishes to proceed.   -He has been compliant with his Eliquis.  Last dose of Eliquis was last night  Abnormal CTA chest:  - Small plaque-like 19 x 8 mm lesion of the posterior left lower chest wall, perhaps an unusual focus of postinflammatory scarring. Differential would include mild/early changes of pleural-based neoplasm, and comparison with any previous outside studies   is suggested. Otherwise consider follow-up CT scan within 3 months. If the patient has current symptoms that refer specifically to this region, consider evaluation by pulmonology and perhaps PET/CT scan  3.  Dilated cardiomyopathy:  -EF 48%: Etiology likely due to A-fib with RVR plus alcohol use.  Current class I CHF symptoms, continue lisinopril and Coreg.  4.  Hypertension:  -Patient reports blood pressures at home usually in  the 120s.  Today blood pressure is elevated in the 180s to 200s systolic.  We will give 1 dose of IV hydralazine 20 mg now.  5.  EtOH:  -Patient drinks approximately 5 beers per day        Electronically signed by WILLIAM Rosas, 08/15/23, 11:03 AM EDT.    I have read the above note and agree

## 2023-08-15 NOTE — Clinical Note
Replaced previous sheath in the right femoral vein. RFV 7f short sheath exchanegd for acuguide mini sheath over wire

## 2023-08-15 NOTE — Clinical Note
Replaced previous sheath in the right femoral vein. RFV 7fr sheath exchanged for acuguide mini sheath over wire

## 2023-08-15 NOTE — Clinical Note
Replaced previous sheath in the right femoral vein. RFV vizigo sheath exchanged for 7fr short sheath over wire

## 2023-08-15 NOTE — ANESTHESIA PREPROCEDURE EVALUATION
Anesthesia Evaluation     Patient summary reviewed and Nursing notes reviewed                Airway   Mallampati: II  TM distance: >3 FB  Neck ROM: full  No difficulty expected  Dental - normal exam     Pulmonary - normal exam   (+) a smoker (2012) Former,  Cardiovascular - normal exam    (+) hypertensiondysrhythmias (eliquis yesterday) Paroxysmal Atrial Fib    ROS comment:   Echo 8/22: EF 48%, no significant valve disease    Neuro/Psych- negative ROS  GI/Hepatic/Renal/Endo - negative ROS     Musculoskeletal (-) negative ROS    Abdominal  - normal exam    Bowel sounds: normal.   Substance History   (+) alcohol use (56 drinks/week)     OB/GYN negative ob/gyn ROS         Other                        Anesthesia Plan    ASA 3     general     intravenous induction     Anesthetic plan, risks, benefits, and alternatives have been provided, discussed and informed consent has been obtained with: patient.    Plan discussed with CRNA.    CODE STATUS:

## 2023-08-15 NOTE — Clinical Note
Replaced previous sheath in the right femoral vein. RFV acuguide mini sheath exchanged for 7fr short sheath over wire

## 2023-08-15 NOTE — Clinical Note
Replaced previous sheath in the right femoral vein. RFV acuguide mini sheath exchanged for vizigo sheath over wire

## 2023-08-16 ENCOUNTER — CALL CENTER PROGRAMS (OUTPATIENT)
Dept: CALL CENTER | Facility: HOSPITAL | Age: 68
End: 2023-08-16
Payer: MEDICARE

## 2023-08-16 NOTE — OUTREACH NOTE
PCI/Device Survey      Flowsheet Row Responses   Facility patient discharged from? [place]   Procedure date 08/15/23   Procedure (if device, specify in description) Ablation  [bilat groin]   Performing MD Dr. Jeffrey Goff   Attempt successful? Yes   Call start time 1020   Call end time 1025   Nursing interventions Patient education provided   Is the patient taking prescribed medications: --  [Eliquis to start 8-16-23]   Nursing intervention Reminded to continue to take prescribed medications, Nurse provided patient education   Does the patient have any of the following symptoms related to the cath/surgical site? --  [bilat groin sites are without issues per pt.]   Nursing intervention Patient education provided   Does the patient have an appointment scheduled with the cardiologist? Yes   If the patient is a current smoker, are they able to teach back resources for cessation? Not a smoker   Did the patient feel prepared to go home on the same day as the procedure? Yes   Is the patient satisfied with the same day discharge process? Yes   PCI/Device call completed Yes            Brittany MARQUEZ - Registered Nurse

## 2023-08-17 ENCOUNTER — TELEPHONE (OUTPATIENT)
Dept: CARDIOLOGY | Facility: CLINIC | Age: 68
End: 2023-08-17
Payer: MEDICARE

## 2023-08-17 DIAGNOSIS — R93.89 ABNORMAL CHEST CT: Primary | ICD-10-CM

## 2023-08-17 NOTE — TELEPHONE ENCOUNTER
----- Message from Jeffrey Goff MD sent at 8/16/2023 11:24 AM EDT -----  Please refer to Pulmonary associated for abnormal Chest CT scan      ----- Message -----  From: [facility] [place] [facility]  Sent: 8/9/2023   9:25 PM EDT  To: Jeffrey Goff MD

## 2023-09-19 ENCOUNTER — OFFICE VISIT (OUTPATIENT)
Dept: CARDIOLOGY | Facility: HOSPITAL | Age: 68
End: 2023-09-19
Payer: MEDICARE

## 2023-09-19 ENCOUNTER — HOSPITAL ENCOUNTER (OUTPATIENT)
Dept: CARDIOLOGY | Facility: HOSPITAL | Age: 68
Discharge: HOME | End: 2023-09-19
Payer: MEDICARE

## 2023-09-19 VITALS
TEMPERATURE: 98.1 F | SYSTOLIC BLOOD PRESSURE: 142 MMHG | OXYGEN SATURATION: 99 % | HEART RATE: 55 BPM | BODY MASS INDEX: 28.03 KG/M2 | HEIGHT: 71 IN | DIASTOLIC BLOOD PRESSURE: 73 MMHG | RESPIRATION RATE: 16 BRPM | WEIGHT: 200.2 LBS

## 2023-09-19 DIAGNOSIS — I42.0 CARDIOMYOPATHY, DILATED: ICD-10-CM

## 2023-09-19 DIAGNOSIS — I48.19 ATRIAL FIBRILLATION, PERSISTENT: Primary | ICD-10-CM

## 2023-09-19 DIAGNOSIS — I10 PRIMARY HYPERTENSION: ICD-10-CM

## 2023-09-19 DIAGNOSIS — I48.19 ATRIAL FIBRILLATION, PERSISTENT: ICD-10-CM

## 2023-09-19 NOTE — PROGRESS NOTES
"Chief Complaint  Atrial Fibrillation    Subjective    History of Present Illness {  Problem List  Visit  Diagnosis   Encounters  Notes  Medications  Labs  Result Review Imaging  Media :23}       History of Present Illness   67-year-old male presents the office today for ongoing evaluation  of his paroxysmal atrial fibrillation. Patient underwent a PVA per Dr. Goff with radiofrequency ablation of the pulmonary veins, radiofrequency catheter ablation of the creation of 2 linear lines along the left atrial groove and left atrial posterior wall on 8/15/2023.  He reports that he is feeling well and has not had any A-fib episodes since the PVA.  Reports that his energy is improving.  Denies chest pain, dyspnea, dizziness, presyncope or syncope.  Objective     Vital Signs:   Vitals:    09/19/23 1246   BP: 142/73   BP Location: Left arm   Patient Position: Sitting   Cuff Size: Adult   Pulse: 55   Resp: 16   Temp: 98.1 °F (36.7 °C)   TempSrc: Temporal   SpO2: 99%   Weight: 90.8 kg (200 lb 3.2 oz)   Height: 180.3 cm (71\")     Body mass index is 27.92 kg/m².  Physical Exam  Vitals and nursing note reviewed.   Constitutional:       Appearance: Normal appearance.   HENT:      Head: Normocephalic.   Eyes:      Pupils: Pupils are equal, round, and reactive to light.   Cardiovascular:      Rate and Rhythm: Normal rate and regular rhythm. Bradycardia present.      Pulses: Normal pulses.      Heart sounds: Normal heart sounds. No murmur heard.  Pulmonary:      Effort: Pulmonary effort is normal.      Breath sounds: Normal breath sounds.   Abdominal:      General: Bowel sounds are normal.      Palpations: Abdomen is soft.   Musculoskeletal:         General: Normal range of motion.      Cervical back: Normal range of motion.      Right lower leg: No edema.      Left lower leg: No edema.   Skin:     General: Skin is warm and dry.      Capillary Refill: Capillary refill takes less than 2 seconds.   Neurological:      " Mental Status: He is alert and oriented to person, place, and time.   Psychiatric:         Mood and Affect: Mood normal.         Thought Content: Thought content normal.            Result Review  Data Reviewed:{ Labs  Result Review  Imaging  Med Tab  Media :23}     Hemoglobin A1c (08/08/2023 09:25)  CBC (No diff) (08/08/2023 09:25)  Basic metabolic panel (08/08/2023 09:25)  Protime-INR (08/08/2023 09:25)     EKG today sinus bradycardia at 57 bpm     Assessment and Plan {CC Problem List  Visit Diagnosis  ROS  Review (Popup)  Health Maintenance  Quality  BestPractice  Medications  SmartSets  SnapShot Encounters  Media :23}   1. Atrial fibrillation, persistent  S/p pva   CHADS-VASc Risk Assessment              3 Total Score    1 CHF    1 Hypertension    1 Age 65-74        Criteria that do not apply:    Age >/= 75    DM    PRIOR STROKE/TIA/THROMBO    Vascular Disease    Sex: Female        Eliquis samples given 5 mg boxes x4 boxes  Lot: [id number] exp 5/25    - ECG 12 Lead; Future    2. Primary hypertension  Stable on coreg, lisinopril    3. Cardiomyopathy, dilated  Stable on lisinopril, coreg           Follow Up {Instructions Charge Capture  Follow-up Communications :23}   Return if symptoms worsen or fail to improve.    Patient was given instructions and counseling regarding his condition or for health maintenance advice. Please see specific information pulled into the AVS if appropriate.  Patient was instructed to call the [facility] with any questions, concerns, or worsening symptoms.

## 2023-09-27 RX ORDER — LISINOPRIL 20 MG/1
20 TABLET ORAL DAILY
Qty: 90 TABLET | Refills: 3 | Status: SHIPPED | OUTPATIENT
Start: 2023-09-27

## 2023-10-23 ENCOUNTER — TELEPHONE (OUTPATIENT)
Dept: CARDIOLOGY | Facility: CLINIC | Age: 68
End: 2023-10-23
Payer: MEDICARE

## 2023-10-23 RX ORDER — LISINOPRIL 30 MG/1
30 TABLET ORAL DAILY
Qty: 90 TABLET | Refills: 2 | Status: SHIPPED | OUTPATIENT
Start: 2023-10-23

## 2023-10-23 NOTE — TELEPHONE ENCOUNTER
Lets increase Lisinopril to 30mg daily. Continue to monitor BP and HR over the next week. He should let us know if he starts developing any symptoms or report to the ER if he begins experiencing CP.

## 2023-10-23 NOTE — TELEPHONE ENCOUNTER
Patient's wife called back and states patient's BP is currently 170/90. I notified patient's wife someone will be in contact to follow up.

## 2023-10-23 NOTE — TELEPHONE ENCOUNTER
Patient's wife called with concerns with patient's BP. She states patient's BP is 186/103  HR 59 bpm this morning. Patient took his Lisinopril and Carvedilol 10 mins prior to checking BP. She states patient does not feel poorly but patient communicated to her that his face felt hot.     She states after taking his BP medication his systolic will be in the 140s throughout the day but will increase to a systolic to the 180s by the end of the day.     I communicated to patient's wife to have the patient recheck his BP in a hour and call the office to let us know what it is.         Patient currently takes Carvedilol 6.25 mg BID and Lisinopril 20 mg daily.

## 2023-10-25 ENCOUNTER — TELEPHONE (OUTPATIENT)
Dept: FAMILY MEDICINE CLINIC | Facility: CLINIC | Age: 68
End: 2023-10-25
Payer: MEDICARE

## 2023-10-25 NOTE — TELEPHONE ENCOUNTER
I would recommend going to the emergency room with blood pressure that high and then following up with cardiology.  As he is a current cardiac patient.

## 2023-10-25 NOTE — TELEPHONE ENCOUNTER
HIS  WIFE CALLED AND SAID THAT HIS BLOOD PRESSURE IS RUNNING HIGH. SAID THAT CARDIO INCREASED HIS BLOOD PRESSURE MED ON MONDAY AND ITS INCREASING, HAS GOT UP /105. HE IS NOT COVERED WITH [facility]ARDIO THROUGH HIS [employer] INSURANCE RIGHT NOW. WANTED TO KNOW IF HE CAN BE WORKED IN TODAY OR IF SOMEONE CAN CALL BACK WITH SOME DIRECTION.     PLEASE ADVISE    [phone]

## 2023-10-30 NOTE — TELEPHONE ENCOUNTER
Patient called back to let you know that his BP is still fluctuating. It is ranging from 106/67 to 180/99 and his HR usually stays in the low 60's.     He states that he does not feel bad. He can just tell when his BP is high because his face feels flushed.    He would like to know if his medications could be adjusted to help his BP be more controlled?

## 2023-11-15 ENCOUNTER — OFFICE VISIT (OUTPATIENT)
Dept: CARDIOLOGY | Facility: CLINIC | Age: 68
End: 2023-11-15
Payer: MEDICARE

## 2023-11-15 VITALS
HEART RATE: 64 BPM | HEIGHT: 71 IN | SYSTOLIC BLOOD PRESSURE: 136 MMHG | DIASTOLIC BLOOD PRESSURE: 66 MMHG | WEIGHT: 201.2 LBS | BODY MASS INDEX: 28.17 KG/M2 | OXYGEN SATURATION: 98 %

## 2023-11-15 DIAGNOSIS — I48.19 PERSISTENT ATRIAL FIBRILLATION: Primary | ICD-10-CM

## 2023-11-15 DIAGNOSIS — I10 PRIMARY HYPERTENSION: ICD-10-CM

## 2023-11-15 DIAGNOSIS — F10.10 ALCOHOL ABUSE: ICD-10-CM

## 2023-11-15 DIAGNOSIS — I42.0 CARDIOMYOPATHY, DILATED: ICD-10-CM

## 2023-11-15 NOTE — PROGRESS NOTES
Jason Lane  1955  [phone]    11/15/2023    [facility] CARDIOLOGY     Referring Provider: No ref. provider found     Anali Weinberg, PA-C  [address] [facility] [address]  [place] [id number]    Chief Complaint   Patient presents with    Atrial Fibrillation     Problem List:      Persistent Atrial fibrillation:  Rapid ventricular response of unknown duration, hospitalized on 01/01/2012.   Pradaxa and sotalol initiated, 01/02/2012.   Echocardiogram, January 2012 with mild mitral regurgitation, ejection fraction 35% to 40%.   Echocardiogram, May 2012: Left ventricular ejection fraction of 45% to 50%, mild TR.   Echocardiogram, 12/15/2014: EF 50% to 55%. Mild-to-moderate TR.  Sotalol discontinued   AMY guided ECV with initiation of Amiodarone 5/13/2022  Amiodarone discontinued 5/2023   s/p EPS + PVA, RFA of left atrial roof of the left posterior wall 8/15/23  Dilated cardiomyopathy new onset 5/13/2022 diagnosed by AMY guided cardioversion  Started on amiodarone   AMY 5/13/2022: EF 48%  Alcohol abuse.   Tobacco abuse.   Hypertension.   Acute systolic congestive heart failure secondary to atrial fibrillation with rapid ventricular response.   Syncope:  Left and right heart catheterization, Dr. Larson, 03/15/2013, with normal pressures and normal coronary arteries: EF 55%    Allergies  No Known Allergies    Current Medications    Current Outpatient Medications:     apixaban (ELIQUIS) 5 MG tablet tablet, Take 1 tablet by mouth Every 12 (Twelve) Hours. First dose tomorrow, Disp: 60 tablet, Rfl: 6    brimonidine (ALPHAGAN) 0.2 % ophthalmic solution, Administer 1 drop to both eyes 2 (Two) Times a Day., Disp: , Rfl:     carvedilol (COREG) 6.25 MG tablet, Take 1 tablet by mouth 2 (Two) Times a Day., Disp: 60 tablet, Rfl: 11    dorzolamide (TRUSOPT) 2 % ophthalmic solution, Administer 1 drop to both eyes 2 (Two) Times a Day., Disp: , Rfl:     latanoprost (XALATAN) 0.005 % ophthalmic solution,  "Administer 1 drop to both eyes Every Night., Disp: , Rfl:     lisinopril (PRINIVIL,ZESTRIL) 40 MG tablet, Take 1 tablet by mouth Daily., Disp: 30 tablet, Rfl: 6    timolol (TIMOPTIC) 0.5 % ophthalmic solution, Administer 1 drop to both eyes Daily., Disp: , Rfl:     History of Present Illness     Pt presents for follow up of persistent afib s/p PVA 8/15/23, dilated CM, HTN. Since we last saw the pt, pt denies any palpitations, SOB, CP, LH, and dizziness. Denies any hospitalizations, ER visits, bleeding, or TIA/CVA symptoms. BP is well controlled today but has been hypertensive over the last few months since his PVA. His lisinopril was decreased on his post op paperwork after PVA and BP has gradually worsened. His lisinopril was increased back to his previous dose and BP has improved in the last week. Overall feels well.    ROS:  General:  Denies fatigue, weight gain or loss  Cardiovascular:  Denies CP, PND, syncope, near syncope, edema or palpitations.  Pulmonary:  Denies JONES, cough, or wheezing      Vitals:    11/15/23 1416   BP: 136/66   BP Location: Left arm   Patient Position: Sitting   Cuff Size: Adult   Pulse: 64   SpO2: 98%   Weight: 91.3 kg (201 lb 3.2 oz)   Height: 180.3 cm (70.98\")     Body mass index is 28.07 kg/m².  PE:  General: NAD  Neck: no JVD, no carotid bruits, no TM  Heart RRR, NL S1, S2, S4 present, no rubs, murmurs  Lungs: CTA, no wheezes, rhonchi, or rales  Abd: soft, non-tender, NL BS  Ext: No musculoskeletal deformities, no edema, cyanosis, or clubbing  Psych: normal mood and affect    Diagnostic Data:        ECG 12 Lead    Date/Time: 11/15/2023 3:11 PM  Performed by: Tiesha Thompson APRN    Authorized by: Felicitas Archuleta APRN  Comparison: compared with previous ECG from 9/28/2022  Similar to previous ECG  Rhythm: sinus rhythm  Rate: normal  BPM: 68            1. Persistent atrial fibrillation    2. Cardiomyopathy, dilated    3. Primary hypertension    4. Alcohol abuse  "       Plan:  Persistent atrial fibrillation:  -Patient previously failed sotalol with recurrence, has been treated with amiodarone for the past year maintaining normal sinus rhythm however there is concern of long-term use of amiodarone.   -now s/p EPS + PVA, RFA of left atrial roof of the left posterior wall   -Doing well with no known recurrence of afib.   -anticoagulation: Eliquis     Abnormal CTA chest:  - Small plaque-like 19 x 8 mm lesion of the posterior left lower chest wall, perhaps an unusual focus of postinflammatory scarring. Differential would include mild/early changes of pleural-based neoplasm, and comparison with any previous outside studies   is suggested. He has an appointment with pulmonology coming up to evaluate.     3.  Dilated cardiomyopathy:  -EF 48%: Etiology likely due to A-fib with RVR plus alcohol use.  Denies heart failure symptoms    4.  Hypertension:  -Patient reports blood pressures have been increased recently but he had been taking half of his normal dose of lisinopril since his PVA. We increased his lisinopril back to his previous dose 40mg daily and BP has improved in the last week. He will let us know if he has further hypertensive episodes.     5.  EtOH:  -Patient drinks approximately 5 beers per day    Follow up in 6 months    Electronically signed by SUSANA Villatoro, 11/15/23, 2:56 PM EST.

## 2023-12-01 ENCOUNTER — TELEPHONE (OUTPATIENT)
Dept: CARDIOLOGY | Facility: CLINIC | Age: 68
End: 2023-12-01
Payer: MEDICARE

## 2023-12-01 NOTE — TELEPHONE ENCOUNTER
Patient saw you in clinic on 11/15/23. You increased his Lisinopril to 40 mg daily.    He states that his BP is still ranging in the 130's/80's to 180's/90's. His HR is ranging between 59-65 bpm.    He denies chest pain, shortness of breath or edema. He states that his face will just feel flushed.    He would like to know if his medications could be adjusted to help control his BP?

## 2023-12-04 RX ORDER — CARVEDILOL 12.5 MG/1
12.5 TABLET ORAL 2 TIMES DAILY
Qty: 180 TABLET | Refills: 3 | Status: SHIPPED | OUTPATIENT
Start: 2023-12-04

## 2023-12-15 ENCOUNTER — OFFICE VISIT (OUTPATIENT)
Dept: FAMILY MEDICINE CLINIC | Facility: CLINIC | Age: 68
End: 2023-12-15
Payer: MEDICARE

## 2023-12-15 VITALS
DIASTOLIC BLOOD PRESSURE: 86 MMHG | SYSTOLIC BLOOD PRESSURE: 148 MMHG | OXYGEN SATURATION: 97 % | WEIGHT: 201.1 LBS | HEART RATE: 71 BPM | TEMPERATURE: 97.7 F | BODY MASS INDEX: 28.15 KG/M2 | HEIGHT: 71 IN

## 2023-12-15 DIAGNOSIS — H40.1134 PRIMARY OPEN ANGLE GLAUCOMA (POAG) OF BOTH EYES, INDETERMINATE STAGE: ICD-10-CM

## 2023-12-15 DIAGNOSIS — I48.19 PERSISTENT ATRIAL FIBRILLATION: ICD-10-CM

## 2023-12-15 DIAGNOSIS — Z12.5 PROSTATE CANCER SCREENING: ICD-10-CM

## 2023-12-15 DIAGNOSIS — Z00.00 GENERAL MEDICAL EXAM: ICD-10-CM

## 2023-12-15 DIAGNOSIS — R09.81 CHRONIC NASAL CONGESTION: ICD-10-CM

## 2023-12-15 DIAGNOSIS — I10 PRIMARY HYPERTENSION: ICD-10-CM

## 2023-12-15 DIAGNOSIS — Z00.00 MEDICARE ANNUAL WELLNESS VISIT, SUBSEQUENT: Primary | ICD-10-CM

## 2023-12-15 RX ORDER — DOXAZOSIN MESYLATE 1 MG/1
1 TABLET ORAL NIGHTLY
Qty: 30 TABLET | Refills: 6 | Status: SHIPPED | OUTPATIENT
Start: 2023-12-15

## 2023-12-15 NOTE — PROGRESS NOTES
The ABCs of the Annual Wellness Visit  Subsequent Medicare Wellness Visit    Chief Complaint   Patient presents with    Medicare Wellness-subsequent     Medicare Wellness, pt of       Subjective   History of Present Illness:  Jason Lane is a 68 y.o. male who presents for a Subsequent Medicare Wellness Visit.    The following portions of the patient's history were reviewed and   updated as appropriate: allergies, current medications, past family history, past social history, past surgical history, and problem list.    Compared to one year ago, the patient feels his physical   health is better.    Compared to one year ago, the patient feels his mental   health is better.    Recent Hospitalizations:  He was admitted within the past 365 days at [facility], [place], operated by [facility].       Current Medical Providers:  Patient Care Team:  Anali Weinberg PA-C as PCP - General (Family Medicine)  Hernan Fernandez PA as Physician Assistant (Cardiology)  Jeffrey Goff MD as Consulting Physician (Cardiac Electrophysiology)    Outpatient Medications Prior to Visit   Medication Sig Dispense Refill    apixaban (ELIQUIS) 5 MG tablet tablet Take 1 tablet by mouth Every 12 (Twelve) Hours. First dose tomorrow 60 tablet 6    brimonidine (ALPHAGAN) 0.2 % ophthalmic solution Administer 1 drop to both eyes 2 (Two) Times a Day.      carvedilol (COREG) 12.5 MG tablet Take 1 tablet by mouth 2 (Two) Times a Day. 180 tablet 3    dorzolamide (TRUSOPT) 2 % ophthalmic solution Administer 1 drop to both eyes 2 (Two) Times a Day.      latanoprost (XALATAN) 0.005 % ophthalmic solution Administer 1 drop to both eyes Every Night.      lisinopril (PRINIVIL,ZESTRIL) 40 MG tablet Take 1 tablet by mouth Daily. 30 tablet 6    timolol (TIMOPTIC) 0.5 % ophthalmic solution Administer 1 drop to both eyes Daily.       No facility-administered medications prior to visit.       No opioid medication identified on active medication list. I have  "reviewed chart for other potential  high risk medication/s and harmful drug interactions in the elderly.        Aspirin is not on active medication list.  Aspirin use is contraindicated for this patient due to: current use of Eliquis.  .    Patient Active Problem List   Diagnosis    Anemia    Atrial fibrillation    Benign prostatic hyperplasia    Reduced libido    Hypertension    Seasonal allergic rhinitis    Eczema    Alcohol abuse    Chronic low back pain without sciatica    Cardiomyopathy, dilated    Long term current use of antiarrhythmic drug    Chronic anticoagulation    Primary open angle glaucoma (POAG) of both eyes, indeterminate stage     Advance Care Planning  ACP discussion was declined by the patient.      Review of Systems      Objective      Vitals:    12/15/23 1410   BP: 148/86   Pulse: 71   Temp: 97.7 °F (36.5 °C)   SpO2: 97%   Weight: 91.2 kg (201 lb 1.6 oz)   Height: 180.3 cm (71\")   PainSc:   3     BMI Readings from Last 1 Encounters:   12/15/23 28.05 kg/m²   BMI is above normal parameters. Recommendations include: Information on healthy weight added to patient's after visit summary    Does the patient have evidence of cognitive impairment? No    Physical Exam            HEALTH RISK ASSESSMENT    Smoking Status:  Social History     Tobacco Use   Smoking Status Former    Packs/day: 1.00    Years: 35.00    Additional pack years: 0.00    Total pack years: 35.00    Types: Cigarettes    Quit date: 2012    Years since quittin.9   Smokeless Tobacco Never     Alcohol Consumption:  Social History     Substance and Sexual Activity   Alcohol Use Yes    Alcohol/week: 35.0 standard drinks of alcohol    Types: 35 Cans of beer per week    Comment: 5 per day     Fall Risk Screen:    EDVIN Fall Risk Assessment was completed, and patient is at LOW risk for falls.Assessment completed on:12/15/2023    Depression Screenin/15/2023     2:09 PM   PHQ-2/PHQ-9 Depression Screening   Little Interest or " Pleasure in Doing Things 0-->not at all   Feeling Down, Depressed or Hopeless 0-->not at all   PHQ-9: Brief Depression Severity Measure Score 0       Health Habits and Functional and Cognitive Screenin/15/2023     2:09 PM   Functional & Cognitive Status   Do you have difficulty preparing food and eating? No   Do you have difficulty bathing yourself, getting dressed or grooming yourself? No   Do you have difficulty using the toilet? No   Do you have difficulty moving around from place to place? No   Do you have trouble with steps or getting out of a bed or a chair? No   Current Diet Well Balanced Diet   Dental Exam Up to date   Eye Exam Up to date   Exercise (times per week) 0 times per week   Current Exercises Include No Regular Exercise   Do you need help using the phone?  No   Are you deaf or do you have serious difficulty hearing?  Yes   Do you need help to go to places out of walking distance? No   Do you need help shopping? No   Do you need help preparing meals?  No   Do you need help with housework?  No   Do you need help with laundry? No   Do you need help taking your medications? No   Do you need help managing money? No   Do you ever drive or ride in a car without wearing a seat belt? No   Have you felt unusual stress, anger or loneliness in the last month? No   Who do you live with? Spouse   If you need help, do you have trouble finding someone available to you? No   Have you been bothered in the last four weeks by sexual problems? No   Do you have difficulty concentrating, remembering or making decisions? No       Age-appropriate Screening Schedule:  Refer to the list below for future screening recommendations based on patient's age, sex and/or medical conditions. Orders for these recommended tests are listed in the plan section. The patient has been provided with a written plan.    Health Maintenance   Topic Date Due    COVID-19 Vaccine (3 - 2023-24 season) 2023    AAA SCREEN (ONE-TIME)   12/15/2023 (Originally 11/22/2022)    TDAP/TD VACCINES (1 - Tdap) 12/08/2027 (Originally 11/8/1974)    BMI FOLLOWUP  01/12/2024    LUNG CANCER SCREENING  08/08/2024    ANNUAL WELLNESS VISIT  12/15/2024    COLORECTAL CANCER SCREENING  03/01/2026    HEPATITIS C SCREENING  Completed    INFLUENZA VACCINE  Discontinued    Pneumococcal Vaccine 65+  Discontinued    ZOSTER VACCINE  Discontinued              Assessment & Plan     CMS Preventative Services Quick Reference  Risk Factors Identified During Encounter  Vision Screening Recommended  The above risks/problems have been discussed with the patient.  Follow up actions/plans if indicated are seen below in the Assessment/Plan Section.  Pertinent information has been shared with the patient in the After Visit Summary.    Diagnoses and all orders for this visit:    1. Medicare annual wellness visit, subsequent (Primary)    2. General medical exam    3. Primary hypertension  -     Lipid Panel; Future    4. Persistent atrial fibrillation  -     Lipid Panel; Future    5. Primary open angle glaucoma (POAG) of both eyes, indeterminate stage    6. Prostate cancer screening  -     PSA Screen; Future    7. Chronic nasal congestion  -     Ambulatory Referral to ENT (Otolaryngology)    Other orders  -     doxazosin (Cardura) 1 MG tablet; Take 1 tablet by mouth Every Night. For BP and prostate  Dispense: 30 tablet; Refill: 6        Follow Up:  Return in about 6 months (around 6/15/2024).     An After Visit Summary and PPPS were given to the patient.

## 2023-12-15 NOTE — PROGRESS NOTES
Subjective   Jason Lane is a 68 y.o. male  Medicare Wellness-subsequent (Medicare Wellness, pt of )      History of Present Illness  Patient presents today for a preventive medical visit.  Patient is here to determine screening labs and tests that are due and to determine immunization status as well.  Patient will be counseled regarding preventative medicine issues such as regular exercise and healthy diet as well.  The following portions of the patient's history were reviewed and updated as appropriate: allergies, current medications, past social history and problem list    Review of Systems   Constitutional: Negative.    HENT:  Positive for congestion (chronic nasal congestion pt requests seeing an ENT at [facility]).    Eyes: Negative.    Respiratory: Negative.     Cardiovascular: Negative.         BP rising up to 180 systolic at points in time daily   Gastrointestinal: Negative.    Endocrine: Negative.    Genitourinary: Negative.         Nocturia, chronically stable   Musculoskeletal: Negative.    Skin: Negative.    Allergic/Immunologic: Negative.    Neurological:  Positive for headaches (at times when BP rises).   Hematological: Negative.    Psychiatric/Behavioral: Negative.     All other systems reviewed and are negative.      Objective     Vitals:    12/15/23 1410   BP: 148/86   Pulse: 71   Temp: 97.7 °F (36.5 °C)   SpO2: 97%       Physical Exam  Vitals and nursing note reviewed.   Constitutional:       General: He is not in acute distress.     Appearance: Normal appearance. He is well-developed. He is not ill-appearing, toxic-appearing or diaphoretic.      Comments: BMI28   HENT:      Head: Normocephalic and atraumatic.      Right Ear: External ear normal.      Left Ear: External ear normal.   Eyes:      Conjunctiva/sclera: Conjunctivae normal.      Pupils: Pupils are equal, round, and reactive to light.   Neck:      Thyroid: No thyromegaly.      Vascular: No carotid bruit.    Cardiovascular:      Rate and Rhythm: Normal rate and regular rhythm.      Pulses: Normal pulses.      Heart sounds: Normal heart sounds. No murmur heard.  Pulmonary:      Effort: Pulmonary effort is normal. No respiratory distress.      Breath sounds: Normal breath sounds.   Abdominal:      General: Bowel sounds are normal.      Palpations: Abdomen is soft. There is no mass.      Tenderness: There is no abdominal tenderness.   Musculoskeletal:         General: No swelling. Normal range of motion.      Cervical back: Normal range of motion and neck supple.   Lymphadenopathy:      Cervical: No cervical adenopathy.   Skin:     General: Skin is warm and dry.      Findings: No lesion or rash.   Neurological:      Mental Status: He is alert and oriented to person, place, and time.      Cranial Nerves: No cranial nerve deficit.      Sensory: No sensory deficit.      Motor: No weakness.      Coordination: Coordination normal.      Gait: Gait normal.      Deep Tendon Reflexes: Reflexes are normal and symmetric.   Psychiatric:         Mood and Affect: Mood normal.         Behavior: Behavior normal.         Thought Content: Thought content normal.         Judgment: Judgment normal.       Discussed preventative medicine issues with patient including regular exercise, healthy diet, stress reduction, adequate sleep and recommended age-appropriate screening studies.  Assessment & Plan     Diagnoses and all orders for this visit:    1. Medicare annual wellness visit, subsequent (Primary)    2. General medical exam    3. Primary hypertension  -     Lipid Panel; Future    4. Persistent atrial fibrillation  -     Lipid Panel; Future    5. Primary open angle glaucoma (POAG) of both eyes, indeterminate stage    6. Prostate cancer screening  -     PSA Screen; Future    7. Chronic nasal congestion  -     Ambulatory Referral to ENT (Otolaryngology)    Other orders  -     doxazosin (Cardura) 1 MG tablet; Take 1 tablet by mouth Every  Night. For BP and prostate  Dispense: 30 tablet; Refill: 6

## 2023-12-18 ENCOUNTER — TELEPHONE (OUTPATIENT)
Dept: FAMILY MEDICINE CLINIC | Facility: CLINIC | Age: 68
End: 2023-12-18
Payer: MEDICARE

## 2023-12-18 RX ORDER — DEXTROMETHORPHAN HYDROBROMIDE AND PROMETHAZINE HYDROCHLORIDE 15; 6.25 MG/5ML; MG/5ML
5 SYRUP ORAL 4 TIMES DAILY PRN
Qty: 180 ML | Refills: 0 | Status: SHIPPED | OUTPATIENT
Start: 2023-12-18

## 2023-12-18 RX ORDER — CEFDINIR 300 MG/1
300 CAPSULE ORAL 2 TIMES DAILY
Qty: 14 CAPSULE | Refills: 0 | Status: SHIPPED | OUTPATIENT
Start: 2023-12-18

## 2023-12-18 NOTE — TELEPHONE ENCOUNTER
Please notify patient have sent in an antibiotic as well as a cough syrup and medicine for nausea to his pharmacy, have him get started on the medication tonight.

## 2023-12-18 NOTE — TELEPHONE ENCOUNTER
Provider: DOCTOR MORENO    Caller: ARNULFO CANO    Relationship to Patient: SPOUSE    Pharmacy: [facility] [place] - [phone]  - [phone]  [phone]     Phone Number: [phone]     Reason for Call: MRS. CANO STATED COUGH, LOW GRADE FEVER AND NAUSEA.    When was the patient last seen: 12/15/23 FOR WELLNESS APPOINTMENT    When did it start: FRIDAY AND SATURDAY    Where is it located: NA    Characteristics of symptom/severity: BAD COUGH     Timing- Is it constant or intermittent: CONTANST    What makes it worse: NA    What makes it better: NA    What therapies/medications have you tried: COUGH MEDICINE AND TYELENOL

## 2023-12-18 NOTE — TELEPHONE ENCOUNTER
Spoke to patient, he has some at home covid tests. He will test himself and call back to let PCP results.

## 2024-01-02 ENCOUNTER — OFFICE VISIT (OUTPATIENT)
Dept: PULMONOLOGY | Facility: CLINIC | Age: 69
End: 2024-01-02
Payer: MEDICARE

## 2024-01-02 VITALS
WEIGHT: 198 LBS | TEMPERATURE: 98.7 F | SYSTOLIC BLOOD PRESSURE: 100 MMHG | HEIGHT: 71 IN | HEART RATE: 73 BPM | BODY MASS INDEX: 27.72 KG/M2 | DIASTOLIC BLOOD PRESSURE: 62 MMHG | OXYGEN SATURATION: 97 %

## 2024-01-02 DIAGNOSIS — J44.9 COPD MIXED TYPE: ICD-10-CM

## 2024-01-02 DIAGNOSIS — R06.09 DOE (DYSPNEA ON EXERTION): Primary | ICD-10-CM

## 2024-01-02 DIAGNOSIS — R91.1 PULMONARY NODULE: ICD-10-CM

## 2024-01-02 DIAGNOSIS — Z87.891 FORMER SMOKER: ICD-10-CM

## 2024-01-02 RX ORDER — PANTOPRAZOLE SODIUM 40 MG/1
40 TABLET, DELAYED RELEASE ORAL DAILY
COMMUNITY

## 2024-01-02 RX ORDER — FLUTICASONE FUROATE, UMECLIDINIUM BROMIDE AND VILANTEROL TRIFENATATE 100; 62.5; 25 UG/1; UG/1; UG/1
1 POWDER RESPIRATORY (INHALATION)
Qty: 1 EACH | Refills: 11 | Status: SHIPPED | OUTPATIENT
Start: 2024-01-02

## 2024-01-02 NOTE — PROGRESS NOTES
PULMONARY  NOTE    Chief Complaint     Abnormal CT scan of the chest, dyspnea on exertion, former smoker, cardiomyopathy    History of Present Illness     68-year-old male referred for an abnormal CT scan of the chest    He has a history of tobacco abuse with 30 years of smoking.  He smoked up to 3 packs/day.  He stopped in January 2012    He has a cardiomyopathy    He has dyspnea on exertion  Activity such as going up a flight of stairs would cause shortness of breath  He recently was prescribed an albuterol inhaler for a URI but has not used it    Recently had a URI but other than that typically does not have cough on a regular basis  No hemoptysis    He underwent a CT angiogram with results as noted below  He has never had chest imaging, such as an LDCT, in the past    Patient Active Problem List   Diagnosis    Anemia    Atrial fibrillation    Benign prostatic hyperplasia    Reduced libido    Hypertension    Seasonal allergic rhinitis    Eczema    Alcohol abuse    Chronic low back pain without sciatica    Cardiomyopathy, dilated    Long term current use of antiarrhythmic drug    Chronic anticoagulation    Primary open angle glaucoma (POAG) of both eyes, indeterminate stage    Stage IV (Very severe) COPD    Former smoker (Stopped 2012)    Pulmonary nodule (LLL pleural based)    JONES (dyspnea on exertion)      No Known Allergies    Current Outpatient Medications:     apixaban (ELIQUIS) 5 MG tablet tablet, Take 1 tablet by mouth Every 12 (Twelve) Hours. First dose tomorrow, Disp: 60 tablet, Rfl: 6    brimonidine (ALPHAGAN) 0.2 % ophthalmic solution, Administer 1 drop to both eyes 2 (Two) Times a Day., Disp: , Rfl:     carvedilol (COREG) 12.5 MG tablet, Take 1 tablet by mouth 2 (Two) Times a Day., Disp: 180 tablet, Rfl: 3    cefdinir (OMNICEF) 300 MG capsule, Take 1 capsule by mouth 2 (Two) Times a Day., Disp: 14 capsule, Rfl: 0    dorzolamide (TRUSOPT) 2 % ophthalmic solution, Administer 1 drop to both eyes 2 (Two)  Times a Day., Disp: , Rfl:     doxazosin (Cardura) 1 MG tablet, Take 1 tablet by mouth Every Night. For BP and prostate, Disp: 30 tablet, Rfl: 6    latanoprost (XALATAN) 0.005 % ophthalmic solution, Administer 1 drop to both eyes Every Night., Disp: , Rfl:     lisinopril (PRINIVIL,ZESTRIL) 40 MG tablet, Take 1 tablet by mouth Daily., Disp: 30 tablet, Rfl: 6    pantoprazole (PROTONIX) 40 MG EC tablet, Take 1 tablet by mouth Daily., Disp: , Rfl:     promethazine-dextromethorphan (PROMETHAZINE-DM) 6.25-15 MG/5ML syrup, Take 5 mL by mouth 4 (Four) Times a Day As Needed for Cough., Disp: 180 mL, Rfl: 0    timolol (TIMOPTIC) 0.5 % ophthalmic solution, Administer 1 drop to both eyes Daily., Disp: , Rfl:   MEDICATION LIST AND ALLERGIES REVIEWED.    Family History   Problem Relation Age of Onset    Heart disease Mother     Thyroid disease Mother     Hyperlipidemia Mother     Kidney disease Mother     Heart disease Maternal Uncle     Heart disease Paternal Uncle     Anemia Other         cousins     Social History     Tobacco Use    Smoking status: Former     Packs/day: 1.00     Years: 35.00     Additional pack years: 0.00     Total pack years: 35.00     Types: Cigarettes     Quit date: 2012     Years since quittin.0    Smokeless tobacco: Never   Vaping Use    Vaping Use: Never used   Substance Use Topics    Alcohol use: Yes     Alcohol/week: 35.0 standard drinks of alcohol     Types: 35 Cans of beer per week     Comment: 5 per day    Drug use: No     Social History     Social History Narrative        Smoked up through     Smoked up to 3 packs of cigarettes per day    Drinks 35 beers on a weekly basis    Has not worked around asbestos     FAMILY AND SOCIAL HISTORY REVIEWED.    Review of Systems  IF PRESENT REFER TO SCANNED ROS SHEET FROM SAME DATE  OTHERWISE ROS OBTAINED AND NON-CONTRIBUTORY OVER HPI.    /62 (BP Location: Left arm, Patient Position: Sitting, Cuff Size: Adult)   Pulse 73   Temp 98.7  "°F (37.1 °C)   Ht 180.3 cm (71\")   Wt 89.8 kg (198 lb)   SpO2 97% Comment: Room air at rest  BMI 27.62 kg/m²   Physical Exam  Vitals and nursing note reviewed.   Constitutional:       General: He is not in acute distress.     Appearance: He is well-developed. He is not diaphoretic.   HENT:      Head: Normocephalic and atraumatic.   Neck:      Thyroid: No thyromegaly.   Cardiovascular:      Rate and Rhythm: Normal rate and regular rhythm.      Heart sounds: Normal heart sounds. No murmur heard.  Pulmonary:      Effort: Pulmonary effort is normal.      Breath sounds: Normal breath sounds. No stridor.   Abdominal:      General: Bowel sounds are normal.   Lymphadenopathy:      Cervical: No cervical adenopathy.      Upper Body:      Right upper body: No supraclavicular or epitrochlear adenopathy.      Left upper body: No supraclavicular or epitrochlear adenopathy.   Skin:     General: Skin is warm and dry.   Neurological:      Mental Status: He is alert and oriented to person, place, and time.   Psychiatric:         Behavior: Behavior normal.         Results     CT angiogram reviewed on PACS  No pulmonary emboli  No evidence of interstitial lung disease  A somewhat flattened pleural-based density noted in the left lower lobe  No old chest CT scans available for comparison    Chest x-ray today reveals cardiomegaly without effusions, infiltrates, or consolidation    PFTs today reveal very severe airway obstruction, no restriction, and a reduced diffusion capacity    Immunization History   Administered Date(s) Administered    COVID-19 (PFIZER) Purple Cap Monovalent 03/17/2021, 04/07/2021     Problem List       ICD-10-CM ICD-9-CM   1. JONES (dyspnea on exertion)  R06.09 786.09   2. Former smoker (Stopped 2012)  Z87.891 V15.82   3. Stage IV (Very severe) COPD  J44.9 496   4. Pulmonary nodule (LLL pleural based)  R91.1 793.11       Discussion     We reviewed his test results    He has significant obstructive airways " disease  This most likely represents COPD due to his past history of cigarette use  Will get an alpha-1 antitrypsin screen today    We discussed maintenance therapy  He is interested in trying maintenance therapy  I will prescribe Trelegy.  I gave him samples, written instructions, and a demonstration of use  He has albuterol he can use on an as-needed basis    We discussed his chest imaging  He has a pleural-based density in the left base  Unfortunately, no chest imaging available for comparison  This is not something that shows up on a chest x-ray  I would suggest further serial chest imaging and treat it like we do a parenchymal nodule  Will get a CT scan of the chest now and if stable follow-up with a repeat CT scan of the chest in 6 months  Will probably follow for 2 years to ensure radiographic stability and after that time he would qualify for LDCT screening    Follow-up after the CT scan of his chest    Moderate level of Medical Decision Making complexity based on 2 or more chronic stable illnesses and an independent review of test results and/or prescription drug management.    Ash Mancilla MD  Note electronically signed    CC: Anali Weinberg, RIA

## 2024-01-03 DIAGNOSIS — R91.1 PULMONARY NODULE: Primary | ICD-10-CM

## 2024-01-05 ENCOUNTER — TELEPHONE (OUTPATIENT)
Dept: FAMILY MEDICINE CLINIC | Facility: CLINIC | Age: 69
End: 2024-01-05

## 2024-01-05 ENCOUNTER — LAB (OUTPATIENT)
Dept: LAB | Facility: HOSPITAL | Age: 69
End: 2024-01-05
Payer: MEDICARE

## 2024-01-05 PROCEDURE — G0103 PSA SCREENING: HCPCS | Performed by: PHYSICIAN ASSISTANT

## 2024-01-05 PROCEDURE — 80061 LIPID PANEL: CPT | Performed by: PHYSICIAN ASSISTANT

## 2024-01-05 NOTE — TELEPHONE ENCOUNTER
LAB called stating patients insurance needed to be updated and the most recent labs need to reordered.     When the labs were ordered the patient had [employer] insurance so it went to [facility], now that the patient has [place] they can be drawn at  lab.     I have updated the patients insurance in the chart

## 2024-01-16 ENCOUNTER — HOSPITAL ENCOUNTER (OUTPATIENT)
Dept: CT IMAGING | Facility: HOSPITAL | Age: 69
Discharge: HOME | End: 2024-01-16
Admitting: INTERNAL MEDICINE
Payer: MEDICARE

## 2024-01-16 DIAGNOSIS — R91.1 PULMONARY NODULE: ICD-10-CM

## 2024-01-16 PROCEDURE — 71250 CT THORAX DX C-: CPT

## 2024-01-24 DIAGNOSIS — R06.02 SOB (SHORTNESS OF BREATH): Primary | ICD-10-CM

## 2024-01-24 NOTE — PROGRESS NOTES
"Hernan Fernandez PA Sullivan, Alexis, RN  Needs Echo and needs \"Clerance\" from Dr. Woo Mancilla regarding anesthesia as he has \"advanced COPD\".          Previous Messages       ----- Message -----  From: Mundo Johnson RN  Sent: 1/24/2024   8:59 AM EST  To: WILLIAM Stanton    Received a fax for cardiac clearance and eliquis holding instructions on this patient for a septoplasty and turbinate reduction under general anesthesia on 3/4/2024. Patient is scheduled to see you on 2/15/2024. Last echo 6/2022. Would you like an echo prior to his appointment?   "

## 2024-01-31 ENCOUNTER — OFFICE VISIT (OUTPATIENT)
Dept: PULMONOLOGY | Facility: CLINIC | Age: 69
End: 2024-01-31
Payer: MEDICARE

## 2024-01-31 VITALS
SYSTOLIC BLOOD PRESSURE: 142 MMHG | HEIGHT: 71 IN | BODY MASS INDEX: 28 KG/M2 | WEIGHT: 200 LBS | OXYGEN SATURATION: 98 % | HEART RATE: 60 BPM | TEMPERATURE: 98.7 F | DIASTOLIC BLOOD PRESSURE: 80 MMHG

## 2024-01-31 DIAGNOSIS — R91.1 PULMONARY NODULE: ICD-10-CM

## 2024-01-31 DIAGNOSIS — R06.09 DOE (DYSPNEA ON EXERTION): Primary | ICD-10-CM

## 2024-01-31 DIAGNOSIS — J44.9 COPD MIXED TYPE: ICD-10-CM

## 2024-01-31 DIAGNOSIS — Z87.891 FORMER SMOKER: ICD-10-CM

## 2024-01-31 NOTE — LETTER
January 31, 2024     Jason Cole MD  [facility]  2nd Floor  [place] [address]    Patient: Jason Lane   YOB: 1955   Date of Visit: 1/31/2024     Dear Dr. Douglas MD:    Thank you for referring Jason Lane to me for evaluation. Below are the relevant portions of my assessment and plan of care.    If you have questions, please do not hesitate to call me. I look forward to following Jason along with you.         Sincerely,        Ash Mancilla MD        CC: No Recipients      Progress Notes:  No notes on file

## 2024-01-31 NOTE — PROGRESS NOTES
PULMONARY  NOTE    Chief Complaint     Stage IV COPD, former smoker, abnormal CT scan of the chest, cardiomyopathy    History of Present Illness     68-year-old male returns today for follow-up  I originally saw him 1/2/2024    He has a history of tobacco abuse, resolved in January 2012  Approximately 90-pack-year smoking history    He has stage IV, very severe, chronic obstructive pulmonary disease  On his last visit we prescribed Trelegy with albuterol  He has had no acute exacerbation of symptoms    He has a history of a cardiomyopathy  No recent chest pain or decompensated heart failure    Abnormal CT scan of the chest as noted below  Some interval improvement on follow-up chest imaging    On his last visit he had a alpha-1 antitrypsin screen which was normal, MM    He is being considered for a septoplasty and turbinate reduction by Dr. Cole    Patient Active Problem List   Diagnosis    Anemia    Atrial fibrillation    Benign prostatic hyperplasia    Reduced libido    Hypertension    Seasonal allergic rhinitis    Eczema    Alcohol abuse    Chronic low back pain without sciatica    Cardiomyopathy, dilated    Long term current use of antiarrhythmic drug    Chronic anticoagulation    Primary open angle glaucoma (POAG) of both eyes, indeterminate stage    Stage IV (Very severe) COPD    Former smoker (Stopped 2012)    Pulmonary nodule (LLL pleural based)    JONES (dyspnea on exertion)      No Known Allergies    Current Outpatient Medications:     apixaban (ELIQUIS) 5 MG tablet tablet, Take 1 tablet by mouth Every 12 (Twelve) Hours. First dose tomorrow, Disp: 60 tablet, Rfl: 6    brimonidine (ALPHAGAN) 0.2 % ophthalmic solution, Administer 1 drop to both eyes 2 (Two) Times a Day., Disp: , Rfl:     carvedilol (COREG) 12.5 MG tablet, Take 1 tablet by mouth 2 (Two) Times a Day., Disp: 180 tablet, Rfl: 3    cefdinir (OMNICEF) 300 MG capsule, Take 1 capsule by mouth 2 (Two) Times a Day., Disp: 14 capsule, Rfl: 0     dorzolamide (TRUSOPT) 2 % ophthalmic solution, Administer 1 drop to both eyes 2 (Two) Times a Day., Disp: , Rfl:     doxazosin (Cardura) 1 MG tablet, Take 1 tablet by mouth Every Night. For BP and prostate, Disp: 30 tablet, Rfl: 6    Fluticasone-Umeclidin-Vilant (Trelegy Ellipta) 100-62.5-25 MCG/ACT inhaler, Inhale 1 puff Daily., Disp: 1 each, Rfl: 11    latanoprost (XALATAN) 0.005 % ophthalmic solution, Administer 1 drop to both eyes Every Night., Disp: , Rfl:     lisinopril (PRINIVIL,ZESTRIL) 40 MG tablet, Take 1 tablet by mouth Daily., Disp: 30 tablet, Rfl: 6    pantoprazole (PROTONIX) 40 MG EC tablet, Take 1 tablet by mouth Daily., Disp: , Rfl:     promethazine-dextromethorphan (PROMETHAZINE-DM) 6.25-15 MG/5ML syrup, Take 5 mL by mouth 4 (Four) Times a Day As Needed for Cough., Disp: 180 mL, Rfl: 0    timolol (TIMOPTIC) 0.5 % ophthalmic solution, Administer 1 drop to both eyes Daily., Disp: , Rfl:   MEDICATION LIST AND ALLERGIES REVIEWED.    Family History   Problem Relation Age of Onset    Heart disease Mother     Thyroid disease Mother     Hyperlipidemia Mother     Kidney disease Mother     Heart disease Maternal Uncle     Heart disease Paternal Uncle     Anemia Other         cousins     Social History     Tobacco Use    Smoking status: Former     Packs/day: 1.00     Years: 35.00     Additional pack years: 0.00     Total pack years: 35.00     Types: Cigarettes     Quit date: 2012     Years since quittin.0    Smokeless tobacco: Never   Vaping Use    Vaping Use: Never used   Substance Use Topics    Alcohol use: Yes     Alcohol/week: 35.0 standard drinks of alcohol     Types: 35 Cans of beer per week     Comment: 5 per day    Drug use: No     Social History     Social History Narrative        Smoked up through     Smoked up to 3 packs of cigarettes per day    Drinks 35 beers on a weekly basis    Has not worked around asbestos     FAMILY AND SOCIAL HISTORY REVIEWED.    Review of Systems  IF  "PRESENT REFER TO SCANNED ROS SHEET FROM SAME DATE  OTHERWISE ROS OBTAINED AND NON-CONTRIBUTORY OVER HPI.    /80   Pulse 60   Temp 98.7 °F (37.1 °C) (Infrared)   Ht 180.3 cm (70.98\")   Wt 90.7 kg (200 lb)   SpO2 98% Comment: room air at rest  BMI 27.91 kg/m²   Physical Exam  Vitals and nursing note reviewed.   Constitutional:       General: He is not in acute distress.     Appearance: He is well-developed. He is not diaphoretic.   HENT:      Head: Normocephalic and atraumatic.   Neck:      Thyroid: No thyromegaly.   Cardiovascular:      Rate and Rhythm: Normal rate and regular rhythm.      Heart sounds: Normal heart sounds. No murmur heard.  Pulmonary:      Effort: Pulmonary effort is normal.      Breath sounds: Normal breath sounds. No stridor.   Lymphadenopathy:      Cervical: No cervical adenopathy.      Upper Body:      Right upper body: No supraclavicular or epitrochlear adenopathy.      Left upper body: No supraclavicular or epitrochlear adenopathy.   Skin:     General: Skin is warm and dry.   Neurological:      Mental Status: He is alert and oriented to person, place, and time.   Psychiatric:         Behavior: Behavior normal.         Results     CT scan of the chest from 1/16/2024 reviewed on PACS  Stability in the previously noted 6 mm pulmonary nodule and some interval decrease in the size of the pleural-based plaque-like density in the left lower lobe    Immunization History   Administered Date(s) Administered    COVID-19 (PFIZER) Purple Cap Monovalent 03/17/2021, 04/07/2021     Problem List       ICD-10-CM ICD-9-CM   1. JONES (dyspnea on exertion)  R06.09 786.09   2. Former smoker (Stopped 2012)  Z87.891 V15.82   3. Pulmonary nodule (LLL pleural based)  R91.1 793.11   4. Stage IV (Very severe) COPD  J44.9 496       Discussion     We reviewed his chest imaging which is a stable/improved  I recommended a 6-month follow-up CT scan of the chest which we will arrange    He is can to remain on Trelegy " with albuterol as needed  Has had no recent acute exacerbation of COPD    From a a pulmonary standpoint he does represent an increased risk for pulmonary complications related to general anesthesia and surgery due to his advanced stage COPD however these risks are not prohibitive to him undergoing surgery    I will see him back in 6 months with a repeat CT scan of the chest    Moderate level of Medical Decision Making complexity based on 2 or more chronic stable illnesses and an independent review of test results and/or prescription drug management.  Ash Mancilla MD  Note electronically signed    CC: Anali Weinberg PA-C

## 2024-02-01 ENCOUNTER — HOSPITAL ENCOUNTER (OUTPATIENT)
Dept: CARDIOLOGY | Facility: HOSPITAL | Age: 69
Discharge: HOME | End: 2024-02-01
Admitting: PHYSICIAN ASSISTANT
Payer: MEDICARE

## 2024-02-01 VITALS — WEIGHT: 199.96 LBS | HEIGHT: 71 IN | BODY MASS INDEX: 27.99 KG/M2

## 2024-02-01 DIAGNOSIS — R91.1 PULMONARY NODULE: Primary | ICD-10-CM

## 2024-02-01 DIAGNOSIS — R06.02 SOB (SHORTNESS OF BREATH): ICD-10-CM

## 2024-02-01 LAB
BH CV ECHO MEAS - AO MAX PG: 5.4 MMHG
BH CV ECHO MEAS - AO MEAN PG: 3 MMHG
BH CV ECHO MEAS - AO ROOT DIAM: 3.5 CM
BH CV ECHO MEAS - AO V2 MAX: 116 CM/SEC
BH CV ECHO MEAS - AO V2 VTI: 22.6 CM
BH CV ECHO MEAS - AVA(I,D): 2.6 CM2
BH CV ECHO MEAS - EDV(CUBED): 237.2 ML
BH CV ECHO MEAS - EDV(MOD-SP2): 91 ML
BH CV ECHO MEAS - EDV(MOD-SP4): 95 ML
BH CV ECHO MEAS - EF(MOD-BP): 50 %
BH CV ECHO MEAS - EF(MOD-SP2): 50.5 %
BH CV ECHO MEAS - EF(MOD-SP4): 46.3 %
BH CV ECHO MEAS - ESV(CUBED): 111.3 ML
BH CV ECHO MEAS - ESV(MOD-SP2): 45 ML
BH CV ECHO MEAS - ESV(MOD-SP4): 51 ML
BH CV ECHO MEAS - FS: 22.3 %
BH CV ECHO MEAS - IVS/LVPW: 0.99 CM
BH CV ECHO MEAS - IVSD: 1.14 CM
BH CV ECHO MEAS - LA DIMENSION: 3.9 CM
BH CV ECHO MEAS - LAT PEAK E' VEL: 8.4 CM/SEC
BH CV ECHO MEAS - LV DIASTOLIC VOL/BSA (35-75): 45.2 CM2
BH CV ECHO MEAS - LV MASS(C)D: 310.6 GRAMS
BH CV ECHO MEAS - LV MAX PG: 2.8 MMHG
BH CV ECHO MEAS - LV MEAN PG: 1 MMHG
BH CV ECHO MEAS - LV SYSTOLIC VOL/BSA (12-30): 24.3 CM2
BH CV ECHO MEAS - LV V1 MAX: 84.2 CM/SEC
BH CV ECHO MEAS - LV V1 VTI: 16.7 CM
BH CV ECHO MEAS - LVIDD: 6.2 CM
BH CV ECHO MEAS - LVIDS: 4.8 CM
BH CV ECHO MEAS - LVOT AREA: 3.5 CM2
BH CV ECHO MEAS - LVOT DIAM: 2.1 CM
BH CV ECHO MEAS - LVPWD: 1.15 CM
BH CV ECHO MEAS - MED PEAK E' VEL: 6.8 CM/SEC
BH CV ECHO MEAS - MV A MAX VEL: 37.5 CM/SEC
BH CV ECHO MEAS - MV DEC SLOPE: 321 CM/SEC2
BH CV ECHO MEAS - MV DEC TIME: 0.19 SEC
BH CV ECHO MEAS - MV E MAX VEL: 61.2 CM/SEC
BH CV ECHO MEAS - MV E/A: 1.63
BH CV ECHO MEAS - MV MAX PG: 2.11 MMHG
BH CV ECHO MEAS - MV MEAN PG: 1 MMHG
BH CV ECHO MEAS - MV P1/2T: 74.3 MSEC
BH CV ECHO MEAS - MV V2 VTI: 27.2 CM
BH CV ECHO MEAS - MVA(P1/2T): 3 CM2
BH CV ECHO MEAS - MVA(VTI): 2.13 CM2
BH CV ECHO MEAS - PA ACC SLOPE: 304 CM/SEC2
BH CV ECHO MEAS - PA ACC TIME: 0.17 SEC
BH CV ECHO MEAS - RAP SYSTOLE: 8 MMHG
BH CV ECHO MEAS - RVSP: 34 MMHG
BH CV ECHO MEAS - SI(MOD-SP2): 21.9 ML/M2
BH CV ECHO MEAS - SI(MOD-SP4): 21 ML/M2
BH CV ECHO MEAS - SV(LVOT): 57.8 ML
BH CV ECHO MEAS - SV(MOD-SP2): 46 ML
BH CV ECHO MEAS - SV(MOD-SP4): 44 ML
BH CV ECHO MEAS - TAPSE (>1.6): 1.7 CM
BH CV ECHO MEAS - TR MAX PG: 26 MMHG
BH CV ECHO MEAS - TR MAX VEL: 255 CM/SEC
BH CV ECHO MEASUREMENTS AVERAGE E/E' RATIO: 8.05
BH CV VAS BP LEFT ARM: NORMAL MMHG
BH CV XLRA - RV BASE: 3.9 CM
BH CV XLRA - RV LENGTH: 8.2 CM
BH CV XLRA - RV MID: 3.4 CM
BH CV XLRA - TDI S': 10.6 CM/SEC
IVRT: 95 MS
LEFT ATRIUM VOLUME INDEX: 28.1 ML/M2
LEFT ATRIUM VOLUME: 59 ML
LV EF 2D ECHO EST: 55 %

## 2024-02-01 PROCEDURE — 93306 TTE W/DOPPLER COMPLETE: CPT

## 2024-02-02 ENCOUNTER — DOCUMENTATION (OUTPATIENT)
Dept: CARDIOLOGY | Facility: CLINIC | Age: 69
End: 2024-02-02
Payer: MEDICARE

## 2024-02-02 NOTE — PROGRESS NOTES
02/02/24      Re: Jason Lane, 1955   Procedure/Surgery -            Jason Lane, 1955 is LOW  RISK  for scheduled procedure/surgery from a cardiac/EP standpoint.  Any questions please call [phone].    []  Continue Aspirin   []  Hold Plavix/Effient/Brilinta     [x]  Hold /Eliquis  2 Days  []  Other:        Sincerely,      WILLIAM Salter PA

## 2024-02-15 ENCOUNTER — OFFICE VISIT (OUTPATIENT)
Dept: CARDIOLOGY | Facility: CLINIC | Age: 69
End: 2024-02-15
Payer: MEDICARE

## 2024-02-15 VITALS
WEIGHT: 204 LBS | HEART RATE: 72 BPM | HEIGHT: 71 IN | OXYGEN SATURATION: 98 % | SYSTOLIC BLOOD PRESSURE: 122 MMHG | BODY MASS INDEX: 28.56 KG/M2 | DIASTOLIC BLOOD PRESSURE: 68 MMHG

## 2024-02-15 DIAGNOSIS — I10 PRIMARY HYPERTENSION: ICD-10-CM

## 2024-02-15 DIAGNOSIS — I48.0 PAROXYSMAL ATRIAL FIBRILLATION: Primary | ICD-10-CM

## 2024-02-15 DIAGNOSIS — I42.0 CARDIOMYOPATHY, DILATED: ICD-10-CM

## 2024-02-15 PROCEDURE — 3078F DIAST BP <80 MM HG: CPT | Performed by: PHYSICIAN ASSISTANT

## 2024-02-15 PROCEDURE — 3074F SYST BP LT 130 MM HG: CPT | Performed by: PHYSICIAN ASSISTANT

## 2024-02-15 PROCEDURE — 99214 OFFICE O/P EST MOD 30 MIN: CPT | Performed by: PHYSICIAN ASSISTANT

## 2024-02-15 PROCEDURE — 93000 ELECTROCARDIOGRAM COMPLETE: CPT | Performed by: PHYSICIAN ASSISTANT

## 2024-02-15 RX ORDER — ALBUTEROL SULFATE 90 UG/1
2 AEROSOL, METERED RESPIRATORY (INHALATION) EVERY 4 HOURS PRN
COMMUNITY

## 2024-05-21 ENCOUNTER — OFFICE VISIT (OUTPATIENT)
Dept: FAMILY MEDICINE CLINIC | Facility: CLINIC | Age: 69
End: 2024-05-21
Payer: MEDICARE

## 2024-05-21 ENCOUNTER — TELEPHONE (OUTPATIENT)
Dept: FAMILY MEDICINE CLINIC | Facility: CLINIC | Age: 69
End: 2024-05-21

## 2024-05-21 VITALS
OXYGEN SATURATION: 100 % | BODY MASS INDEX: 28.63 KG/M2 | HEIGHT: 71 IN | HEART RATE: 74 BPM | TEMPERATURE: 97.8 F | DIASTOLIC BLOOD PRESSURE: 82 MMHG | SYSTOLIC BLOOD PRESSURE: 136 MMHG | WEIGHT: 204.5 LBS

## 2024-05-21 DIAGNOSIS — T16.1XXA FOREIGN BODY IN AURICLE OF RIGHT EAR, INITIAL ENCOUNTER: Primary | ICD-10-CM

## 2024-05-21 PROCEDURE — 69200 CLEAR OUTER EAR CANAL: CPT | Performed by: PHYSICIAN ASSISTANT

## 2024-05-21 PROCEDURE — 1159F MED LIST DOCD IN RCRD: CPT | Performed by: PHYSICIAN ASSISTANT

## 2024-05-21 PROCEDURE — 1160F RVW MEDS BY RX/DR IN RCRD: CPT | Performed by: PHYSICIAN ASSISTANT

## 2024-05-21 PROCEDURE — 3075F SYST BP GE 130 - 139MM HG: CPT | Performed by: PHYSICIAN ASSISTANT

## 2024-05-21 PROCEDURE — 99212 OFFICE O/P EST SF 10 MIN: CPT | Performed by: PHYSICIAN ASSISTANT

## 2024-05-21 PROCEDURE — 3079F DIAST BP 80-89 MM HG: CPT | Performed by: PHYSICIAN ASSISTANT

## 2024-05-21 PROCEDURE — 1125F AMNT PAIN NOTED PAIN PRSNT: CPT | Performed by: PHYSICIAN ASSISTANT

## 2024-05-21 NOTE — PROGRESS NOTES
Subjective   Jason Lane is a 68 y.o. male  Foreign Body in Ear (Hearing aid dome is stuck in right ear)      History of Present Illness  Matt Lane, 1955,  is a 68-year-old male who is coming in today feeling like he lost part of his hearing aid in his ** (laterality not specified) ear.    He experienced a misplaced portion of his hearing aid, which was subsequently dislodged in his right ear. He was able to remove the aid from his right ear and he subsequently replaced it with a new device the following day. Currently, he reports an improvement in his auditory function.    The following portions of the patient's history were reviewed and updated as appropriate: allergies, current medications, past social history and problem list    Review of Systems   Constitutional:  Positive for activity change.   HENT:  Positive for hearing loss.    Neurological: Negative.        Objective     Vitals:    05/21/24 1407   BP: 136/82   Pulse: 74   Temp: 97.8 °F (36.6 °C)   SpO2: 100%       Physical Exam  Vitals and nursing note reviewed.   Constitutional:       General: He is not in acute distress.     Appearance: Normal appearance. He is well-developed. He is not ill-appearing, toxic-appearing or diaphoretic.   HENT:      Head: Normocephalic and atraumatic.      Right Ear: A foreign body is present.      Ears:      Comments: Hearing aid dome seen in right ear canal.  After obtaining patient's consent irrigation performed to remove hearing aid dome, following removal is able to visualize TM clearly and ear canal clearly no abnormality noted after foreign body removed.  Eyes:      Conjunctiva/sclera: Conjunctivae normal.   Cardiovascular:      Rate and Rhythm: Normal rate and regular rhythm.   Pulmonary:      Effort: Pulmonary effort is normal.      Breath sounds: Normal breath sounds.   Neurological:      Mental Status: He is alert and oriented to person, place, and time.      Coordination: Coordination normal.    Psychiatric:         Attention and Perception: He is attentive.         Mood and Affect: Mood normal.         Behavior: Behavior normal.         Thought Content: Thought content normal.         Judgment: Judgment normal.         Assessment & Plan     Diagnoses and all orders for this visit:    1. Foreign body in auricle of right ear, initial encounter (Primary)    Auditory impairment.  Improved after removing the piece of hearing aid from his ear. Discussed if he cannot find a piece of his aid in the future to make an appointment for further evaluation.      I spent 15 minutes in patient care: Reviewing records prior to the visit, examining the patient, entering orders and documentation    Part of this note may be an electronic transcription/translation of spoken language to printed text using the Dragon Dictation System.      Transcribed from ambient dictation for Anali Weinberg PA-C by Kerry White.  05/21/24   14:27 EDT    Patient or patient representative verbalized consent to the visit recording.  I have personally performed the services described in this document as transcribed by the above individual, and it is both accurate and complete.

## 2024-05-21 NOTE — TELEPHONE ENCOUNTER
Hub staff attempted to follow warm transfer process and was unsuccessful     Caller: Melina Lane    Relationship to patient: Emergency Contact    Best call back number: [phone]    Patient is needing: PATIENT HAS A TIP OF HIS HEARING AIDE STUCK IN HIS EAR. WOULD LIKE APPOINTMENT FOR TODAY HOWEVER NOTHING WAS AVAILABLE. PLEASE ADVISE

## 2024-07-10 RX ORDER — DOXAZOSIN 2 MG/1
TABLET ORAL
Qty: 15 TABLET | Refills: 6 | Status: SHIPPED | OUTPATIENT
Start: 2024-07-10

## 2024-07-15 ENCOUNTER — HOSPITAL ENCOUNTER (OUTPATIENT)
Dept: CT IMAGING | Facility: HOSPITAL | Age: 69
Discharge: HOME | End: 2024-07-15
Admitting: INTERNAL MEDICINE
Payer: MEDICARE

## 2024-07-15 DIAGNOSIS — R91.1 PULMONARY NODULE: ICD-10-CM

## 2024-07-15 PROCEDURE — 71250 CT THORAX DX C-: CPT

## 2024-07-31 ENCOUNTER — OFFICE VISIT (OUTPATIENT)
Dept: PULMONOLOGY | Facility: CLINIC | Age: 69
End: 2024-07-31
Payer: MEDICARE

## 2024-07-31 VITALS
HEIGHT: 71 IN | DIASTOLIC BLOOD PRESSURE: 90 MMHG | BODY MASS INDEX: 28.14 KG/M2 | TEMPERATURE: 98.6 F | OXYGEN SATURATION: 97 % | HEART RATE: 50 BPM | WEIGHT: 201 LBS | SYSTOLIC BLOOD PRESSURE: 150 MMHG

## 2024-07-31 DIAGNOSIS — R06.09 DOE (DYSPNEA ON EXERTION): ICD-10-CM

## 2024-07-31 DIAGNOSIS — I42.0 CARDIOMYOPATHY, DILATED: ICD-10-CM

## 2024-07-31 DIAGNOSIS — Z79.01 CHRONIC ANTICOAGULATION: ICD-10-CM

## 2024-07-31 DIAGNOSIS — Z87.891 FORMER SMOKER: ICD-10-CM

## 2024-07-31 DIAGNOSIS — R91.1 PULMONARY NODULE: Primary | ICD-10-CM

## 2024-07-31 DIAGNOSIS — J44.9 COPD MIXED TYPE: ICD-10-CM

## 2024-07-31 PROCEDURE — 3080F DIAST BP >= 90 MM HG: CPT | Performed by: INTERNAL MEDICINE

## 2024-07-31 PROCEDURE — 3077F SYST BP >= 140 MM HG: CPT | Performed by: INTERNAL MEDICINE

## 2024-07-31 PROCEDURE — 1160F RVW MEDS BY RX/DR IN RCRD: CPT | Performed by: INTERNAL MEDICINE

## 2024-07-31 PROCEDURE — 99214 OFFICE O/P EST MOD 30 MIN: CPT | Performed by: INTERNAL MEDICINE

## 2024-07-31 PROCEDURE — 1159F MED LIST DOCD IN RCRD: CPT | Performed by: INTERNAL MEDICINE

## 2024-07-31 NOTE — PROGRESS NOTES
PULMONARY  NOTE    Chief Complaint     Stage IV COPD, former smoker, pulmonary nodules, cardiomyopathy    History of Present Illness     68-year-old male returns today for follow-up  I last saw him 1/31/2024    He has a 90-pack-year smoking history and stopped smoking in January 2012    He has stage IV, very severe, chronic obstructive pulmonary disease  He has been on Trelegy with albuterol  He has had no acute exacerbation of symptoms    He has had abnormal chest imaging with pulmonary nodules  There had been some interval improvement on the last CT scan but most recent CT scan of the chest below reveals a few pulmonary nodules in the left base    He denies regular reflux symptoms    He has a history of a cardiomyopathy but no evidence of congestive heart failure    Patient Active Problem List   Diagnosis    Anemia    Atrial fibrillation    Benign prostatic hyperplasia    Reduced libido    Hypertension    Seasonal allergic rhinitis    Eczema    Alcohol abuse    Chronic low back pain without sciatica    Cardiomyopathy, dilated    Long term current use of antiarrhythmic drug    Chronic anticoagulation    Primary open angle glaucoma (POAG) of both eyes, indeterminate stage    Stage IV (Very severe) COPD    Former smoker (Stopped 2012)    Pulmonary nodule (LLL pleural based)    JONES (dyspnea on exertion)      No Known Allergies    Current Outpatient Medications:     albuterol sulfate  (90 Base) MCG/ACT inhaler, Inhale 2 puffs Every 4 (Four) Hours As Needed for Wheezing., Disp: , Rfl:     apixaban (ELIQUIS) 5 MG tablet tablet, Take 1 tablet by mouth Every 12 (Twelve) Hours. First dose tomorrow, Disp: 60 tablet, Rfl: 6    brimonidine (ALPHAGAN) 0.2 % ophthalmic solution, Administer 1 drop to both eyes 2 (Two) Times a Day., Disp: , Rfl:     carvedilol (COREG) 12.5 MG tablet, Take 1 tablet by mouth 2 (Two) Times a Day., Disp: 180 tablet, Rfl: 3    dorzolamide (TRUSOPT) 2 % ophthalmic solution, Administer 1 drop to  both eyes 2 (Two) Times a Day., Disp: , Rfl:     doxazosin (CARDURA) 2 MG tablet, Take 1/2 tablet by mouth Every Night. For BP and prostate, Disp: 15 tablet, Rfl: 6    Fluticasone-Umeclidin-Vilant (Trelegy Ellipta) 100-62.5-25 MCG/ACT inhaler, Inhale 1 puff Daily., Disp: 1 each, Rfl: 11    latanoprost (XALATAN) 0.005 % ophthalmic solution, Administer 1 drop to both eyes Every Night., Disp: , Rfl:     lisinopril (PRINIVIL,ZESTRIL) 40 MG tablet, Take 1 tablet by mouth Daily., Disp: 30 tablet, Rfl: 6    pantoprazole (PROTONIX) 40 MG EC tablet, Take 1 tablet by mouth Daily., Disp: , Rfl:     timolol (TIMOPTIC) 0.5 % ophthalmic solution, Administer 1 drop to both eyes Daily., Disp: , Rfl:   MEDICATION LIST AND ALLERGIES REVIEWED.    Family History   Problem Relation Age of Onset    Heart disease Mother     Thyroid disease Mother     Hyperlipidemia Mother     Kidney disease Mother     Heart disease Maternal Uncle     Heart disease Paternal Uncle     Anemia Other         cousins     Social History     Tobacco Use    Smoking status: Former     Current packs/day: 0.00     Average packs/day: 1 pack/day for 42.0 years (42.0 ttl pk-yrs)     Types: Cigarettes     Start date:      Quit date: 2012     Years since quittin.5     Passive exposure: Past    Smokeless tobacco: Never   Vaping Use    Vaping status: Never Used   Substance Use Topics    Alcohol use: Yes     Alcohol/week: 35.0 standard drinks of alcohol     Types: 35 Cans of beer per week     Comment: 5 per day    Drug use: No     Social History     Social History Narrative        Smoked up through     Smoked up to 3 packs of cigarettes per day    Drinks 35 beers on a weekly basis    Has not worked around asbestos     FAMILY AND SOCIAL HISTORY REVIEWED.    Review of Systems  IF PRESENT REFER TO SCANNED ROS SHEET FROM SAME DATE  OTHERWISE ROS OBTAINED AND NON-CONTRIBUTORY OVER HPI.    /90   Pulse 50   Temp 98.6 °F (37 °C) (Infrared)   Ht 180.3  "cm (71\")   Wt 91.2 kg (201 lb)   SpO2 97% Comment: resting room air  BMI 28.03 kg/m²   Physical Exam  Vitals and nursing note reviewed.   Constitutional:       General: He is not in acute distress.     Appearance: He is well-developed. He is not diaphoretic.   HENT:      Head: Normocephalic and atraumatic.   Neck:      Thyroid: No thyromegaly.   Cardiovascular:      Rate and Rhythm: Normal rate and regular rhythm.      Heart sounds: Normal heart sounds. No murmur heard.  Pulmonary:      Effort: Pulmonary effort is normal.      Breath sounds: Normal breath sounds. No stridor.   Lymphadenopathy:      Cervical: No cervical adenopathy.      Upper Body:      Right upper body: No supraclavicular or epitrochlear adenopathy.      Left upper body: No supraclavicular or epitrochlear adenopathy.   Skin:     General: Skin is warm and dry.   Neurological:      Mental Status: He is alert and oriented to person, place, and time.   Psychiatric:         Behavior: Behavior normal.         Results     CT scan of the chest on 7/15/2024 revealed stable subpleural nodule but a new solid and groundglass opacity in the left base    Immunization History   Administered Date(s) Administered    COVID-19 (PFIZER) Purple Cap Monovalent 03/17/2021, 04/07/2021     Problem List       ICD-10-CM ICD-9-CM   1. Pulmonary nodule (LLL pleural based)  R91.1 793.11   2. Stage IV (Very severe) COPD  J44.9 496   3. Former smoker (Stopped 2012)  Z87.891 V15.82   4. JONES (dyspnea on exertion)  R06.09 786.09   5. Cardiomyopathy, dilated  I42.0 425.4   6. Chronic anticoagulation  Z79.01 V58.61       Discussion     We reviewed his chest imaging together on PACS  He has new solid and semisolid opacities in the left base  These are too small to biopsy  Other radiographic changes are stable  I recommended a short interval follow-up CT scan of the chest    I encourage continued use of Trelegy with albuterol on an as-needed basis  I gave him samples    I will plan " to see him back after his follow-up CT scan of the chest    Moderate level of Medical Decision Making complexity based on 2 or more chronic stable illnesses and an independent review of test results and/or prescription drug management.    Ash Mancilla MD  Note electronically signed    CC: Anali Weinberg, RIA

## 2024-08-01 DIAGNOSIS — R91.1 PULMONARY NODULE: Primary | ICD-10-CM

## 2024-08-12 ENCOUNTER — TELEPHONE (OUTPATIENT)
Dept: CARDIOLOGY | Facility: HOSPITAL | Age: 69
End: 2024-08-12
Payer: MEDICARE

## 2024-08-12 NOTE — TELEPHONE ENCOUNTER
Pt sent message stating his watch should he was in Afib.  I called him and he saying not having any symptoms. Blood pressure 140/65 and heart rate fluctuating between 68-90. Taking carvedilol 12.5 mg twice daily. He has not been seen here since last September but has a follow up with Dr Goff 8/21/24.

## 2024-08-13 ENCOUNTER — TELEPHONE (OUTPATIENT)
Dept: CARDIOLOGY | Facility: CLINIC | Age: 69
End: 2024-08-13
Payer: MEDICARE

## 2024-08-13 DIAGNOSIS — I48.19 PERSISTENT ATRIAL FIBRILLATION: Primary | ICD-10-CM

## 2024-08-13 NOTE — TELEPHONE ENCOUNTER
Pt's daughter called and LM regarding concern of patient being in afib. On chart review, discussion had already been started about this issue yesterday. Called pt and he is now uncontrolled with rate, fluctuating from . Not experiencing any new symptoms. Instructed pt not to miss any doses of eliquis to which he said he was not.

## 2024-08-13 NOTE — TELEPHONE ENCOUNTER
Called patient. He states BP is well controlled 116/80 but HR is . We will increase his Coreg to 25 mg BID and he will continue to monitor BP/HR let us know if any worsening. Right now he is asymptomatic but will let us know if symptoms develop. He has missed no doses of Eliquis and will continue. Keep follow up next week, if still in afib will consider ECV.

## 2024-08-16 NOTE — TELEPHONE ENCOUNTER
Patient notified and aware that procedure scheduling is going to call him to schedule a cardioversion. Order placed.

## 2024-08-16 NOTE — TELEPHONE ENCOUNTER
Patient called back today to let us now that he is still in Afib. Today his BP is 106/70 and his HR is 80 bpm. He states that according to his Apple watch his HR has been fluctuating between  bpm. He is fatigued, short of breath on exertion and has slight chest tightness. He denies any increased edema or weight gain. He states that since the dose of Coreg was increased, he really can't tell a difference.    Even though he follows up with Dr. Goff on 8/21/24 in clinic, he would like to know if he could have a cardioversion done before then? He has not missed any doses of Eliquis.

## 2024-08-21 ENCOUNTER — OFFICE VISIT (OUTPATIENT)
Dept: CARDIOLOGY | Facility: CLINIC | Age: 69
End: 2024-08-21
Payer: MEDICARE

## 2024-08-21 VITALS
DIASTOLIC BLOOD PRESSURE: 60 MMHG | BODY MASS INDEX: 28.14 KG/M2 | OXYGEN SATURATION: 98 % | WEIGHT: 201 LBS | SYSTOLIC BLOOD PRESSURE: 100 MMHG | HEART RATE: 65 BPM | HEIGHT: 71 IN

## 2024-08-21 DIAGNOSIS — I48.19 PERSISTENT ATRIAL FIBRILLATION: Primary | ICD-10-CM

## 2024-08-21 DIAGNOSIS — I10 PRIMARY HYPERTENSION: ICD-10-CM

## 2024-08-21 DIAGNOSIS — I42.0 CARDIOMYOPATHY, DILATED: ICD-10-CM

## 2024-08-21 NOTE — PROGRESS NOTES
Jason Lane  1955  [phone]    08/21/2024    [facility] CARDIOLOGY     Referring Provider: No ref. provider found     Anali Weinberg, RIA  [address] [facility] [address]  [place] [id number]    Chief Complaint   Patient presents with    Persistent atrial fibrillation    Shortness of Breath     Problem List:   Persistent Atrial fibrillation:  Rapid ventricular response of unknown duration, hospitalized on 01/01/2012.   Pradaxa and sotalol initiated, 01/02/2012.   Echocardiogram, January 2012 with mild mitral regurgitation, ejection fraction 35% to 40%.   Echocardiogram, May 2012: Left ventricular ejection fraction of 45% to 50%, mild TR.   Echocardiogram, 12/15/2014: EF 50% to 55%. Mild-to-moderate TR.  Sotalol discontinued   AMY guided ECV with initiation of Amiodarone 5/13/2022  Amiodarone discontinued 5/2023   s/p EPS + PVA, RFA of left atrial roof of the left posterior wall 8/15/23  Dilated cardiomyopathy new onset 5/13/2022 diagnosed by AMY guided cardioversion  Started on amiodarone   AMY 5/13/2022: EF 48%  Alcohol abuse.   Tobacco abuse.   Hypertension.   Acute systolic congestive heart failure secondary to atrial fibrillation with rapid ventricular response.   Syncope:  Left and right heart catheterization, Dr. Larson, 03/15/2013, with normal pressures and normal coronary arteries: EF 55%       Allergies  No Known Allergies    Current Medications    Current Outpatient Medications:     albuterol sulfate  (90 Base) MCG/ACT inhaler, Inhale 2 puffs Every 4 (Four) Hours As Needed for Wheezing., Disp: , Rfl:     apixaban (ELIQUIS) 5 MG tablet tablet, Take 1 tablet by mouth Every 12 (Twelve) Hours. First dose tomorrow, Disp: 60 tablet, Rfl: 6    brimonidine (ALPHAGAN) 0.2 % ophthalmic solution, Administer 1 drop to both eyes 2 (Two) Times a Day., Disp: , Rfl:     carvedilol (COREG) 12.5 MG tablet, Take 1 tablet by mouth 2 (Two) Times a Day., Disp: 180 tablet, Rfl: 3     "dorzolamide (TRUSOPT) 2 % ophthalmic solution, Administer 1 drop to both eyes 2 (Two) Times a Day., Disp: , Rfl:     doxazosin (CARDURA) 2 MG tablet, Take 1/2 tablet by mouth Every Night. For BP and prostate, Disp: 15 tablet, Rfl: 6    Fluticasone-Umeclidin-Vilant (Trelegy Ellipta) 100-62.5-25 MCG/ACT inhaler, Inhale 1 puff Daily., Disp: 1 each, Rfl: 11    latanoprost (XALATAN) 0.005 % ophthalmic solution, Administer 1 drop to both eyes Every Night., Disp: , Rfl:     lisinopril (PRINIVIL,ZESTRIL) 40 MG tablet, Take 1 tablet by mouth Daily., Disp: 30 tablet, Rfl: 6    timolol (TIMOPTIC) 0.5 % ophthalmic solution, Administer 1 drop to both eyes Daily., Disp: , Rfl:     pantoprazole (PROTONIX) 40 MG EC tablet, Take 1 tablet by mouth Daily. (Patient not taking: Reported on 8/21/2024), Disp: , Rfl:     History of Present Illness     Pt presents for follow up of persistent afib s/p PVA 8/15/23, dilated CM, HTN. Since we last saw the pt, he noticed on 8/12/24 that he was back in afib after checking his pulse and feeling that it was irregular. He was not symptomatic at the time but reached out to our office and an ECV has been scheduled for this coming Friday. He notes today that over the last week his SOB has worsened but otherwise no symptoms from afib. Heart rates were 110-130's so Coreg was increased. HR are now 70s. He has been taking his Eliquis with no missed doses. He denies CP, LH, and dizziness. Denies any hospitalizations, ER visits, bleeding, or TIA/CVA symptoms. BP is well controlled.     ROS:  General:  Denies fatigue, weight gain or loss  Cardiovascular:  Denies CP, PND, syncope, near syncope, edema or palpitations.  Pulmonary:  Denies JONES, cough, or wheezing      Vitals:    08/21/24 1336   BP: 100/60   BP Location: Left arm   Patient Position: Sitting   Pulse: 65   SpO2: 98%   Weight: 91.2 kg (201 lb)   Height: 180.3 cm (71\")     Body mass index is 28.03 kg/m².  PE:  General: NAD  Neck: no JVD, no carotid " bruits, no TM  Heart IRR,IRR, NL S1, S2, S4 present, no rubs, murmurs  Lungs: CTA, no wheezes, rhonchi, or rales  Abd: soft, non-tender, NL BS  Ext: No musculoskeletal deformities, no edema, cyanosis, or clubbing  Psych: normal mood and affect    Diagnostic Data:        ECG 12 Lead    Date/Time: 8/21/2024 2:10 PM  Performed by: Tiesha Thompson APRN    Authorized by: Tiesha Thompson APRN  Comparison: compared with previous ECG from 2/15/2024  Comparison to previous ECG: Afib has replaced NSR  Rhythm: atrial fibrillation  Rate: normal  BPM: 69          Advance Care Planning   ACP discussion was declined by the patient. Patient does not have an advance directive, declines further assistance.       1. Persistent atrial fibrillation    2. Cardiomyopathy, dilated    3. Primary hypertension          Plan:  Persistent atrial fibrillation:  -Patient previously failed sotalol with recurrence. Amiodarone stopped due to concern for long term side effects.  -now s/p EPS + PVA, RFA of left atrial roof of the left posterior wall 8/2023  -Recurrence of afib 8/12/24, Coreg increased to 25 mg BID for better rate control and ordered ECV which he will have on Friday.   -anticoagulation: Eliquis, no missed doses.       Dilated cardiomyopathy:  -previous EF 48%: Etiology likely due to A-fib with RVR plus alcohol use.   Echocardiogram 2/1/2024 revealing normal LV function, normal EF normal valvular function.     3.  Hypertension:  -well controlled currently      4.  EtOH:  -Patient has reduced alcohol intake      Electronically signed by SUSANA Villatoro, 08/21/24, 2:08 PM EDT.

## 2024-08-21 NOTE — H&P (VIEW-ONLY)
Jason Lane  1955  [phone]    08/21/2024    [facility] CARDIOLOGY     Referring Provider: No ref. provider found     Anali Weinberg, RIA  [address] [facility] [address]  [place] [id number]    Chief Complaint   Patient presents with    Persistent atrial fibrillation    Shortness of Breath     Problem List:   Persistent Atrial fibrillation:  Rapid ventricular response of unknown duration, hospitalized on 01/01/2012.   Pradaxa and sotalol initiated, 01/02/2012.   Echocardiogram, January 2012 with mild mitral regurgitation, ejection fraction 35% to 40%.   Echocardiogram, May 2012: Left ventricular ejection fraction of 45% to 50%, mild TR.   Echocardiogram, 12/15/2014: EF 50% to 55%. Mild-to-moderate TR.  Sotalol discontinued   AMY guided ECV with initiation of Amiodarone 5/13/2022  Amiodarone discontinued 5/2023   s/p EPS + PVA, RFA of left atrial roof of the left posterior wall 8/15/23  Dilated cardiomyopathy new onset 5/13/2022 diagnosed by AMY guided cardioversion  Started on amiodarone   AMY 5/13/2022: EF 48%  Alcohol abuse.   Tobacco abuse.   Hypertension.   Acute systolic congestive heart failure secondary to atrial fibrillation with rapid ventricular response.   Syncope:  Left and right heart catheterization, Dr. Larson, 03/15/2013, with normal pressures and normal coronary arteries: EF 55%       Allergies  No Known Allergies    Current Medications    Current Outpatient Medications:     albuterol sulfate  (90 Base) MCG/ACT inhaler, Inhale 2 puffs Every 4 (Four) Hours As Needed for Wheezing., Disp: , Rfl:     apixaban (ELIQUIS) 5 MG tablet tablet, Take 1 tablet by mouth Every 12 (Twelve) Hours. First dose tomorrow, Disp: 60 tablet, Rfl: 6    brimonidine (ALPHAGAN) 0.2 % ophthalmic solution, Administer 1 drop to both eyes 2 (Two) Times a Day., Disp: , Rfl:     carvedilol (COREG) 12.5 MG tablet, Take 1 tablet by mouth 2 (Two) Times a Day., Disp: 180 tablet, Rfl: 3     "dorzolamide (TRUSOPT) 2 % ophthalmic solution, Administer 1 drop to both eyes 2 (Two) Times a Day., Disp: , Rfl:     doxazosin (CARDURA) 2 MG tablet, Take 1/2 tablet by mouth Every Night. For BP and prostate, Disp: 15 tablet, Rfl: 6    Fluticasone-Umeclidin-Vilant (Trelegy Ellipta) 100-62.5-25 MCG/ACT inhaler, Inhale 1 puff Daily., Disp: 1 each, Rfl: 11    latanoprost (XALATAN) 0.005 % ophthalmic solution, Administer 1 drop to both eyes Every Night., Disp: , Rfl:     lisinopril (PRINIVIL,ZESTRIL) 40 MG tablet, Take 1 tablet by mouth Daily., Disp: 30 tablet, Rfl: 6    timolol (TIMOPTIC) 0.5 % ophthalmic solution, Administer 1 drop to both eyes Daily., Disp: , Rfl:     pantoprazole (PROTONIX) 40 MG EC tablet, Take 1 tablet by mouth Daily. (Patient not taking: Reported on 8/21/2024), Disp: , Rfl:     History of Present Illness     Pt presents for follow up of persistent afib s/p PVA 8/15/23, dilated CM, HTN. Since we last saw the pt, he noticed on 8/12/24 that he was back in afib after checking his pulse and feeling that it was irregular. He was not symptomatic at the time but reached out to our office and an ECV has been scheduled for this coming Friday. He notes today that over the last week his SOB has worsened but otherwise no symptoms from afib. Heart rates were 110-130's so Coreg was increased. HR are now 70s. He has been taking his Eliquis with no missed doses. He denies CP, LH, and dizziness. Denies any hospitalizations, ER visits, bleeding, or TIA/CVA symptoms. BP is well controlled.     ROS:  General:  Denies fatigue, weight gain or loss  Cardiovascular:  Denies CP, PND, syncope, near syncope, edema or palpitations.  Pulmonary:  Denies JONES, cough, or wheezing      Vitals:    08/21/24 1336   BP: 100/60   BP Location: Left arm   Patient Position: Sitting   Pulse: 65   SpO2: 98%   Weight: 91.2 kg (201 lb)   Height: 180.3 cm (71\")     Body mass index is 28.03 kg/m².  PE:  General: NAD  Neck: no JVD, no carotid " bruits, no TM  Heart IRR,IRR, NL S1, S2, S4 present, no rubs, murmurs  Lungs: CTA, no wheezes, rhonchi, or rales  Abd: soft, non-tender, NL BS  Ext: No musculoskeletal deformities, no edema, cyanosis, or clubbing  Psych: normal mood and affect    Diagnostic Data:        ECG 12 Lead    Date/Time: 8/21/2024 2:10 PM  Performed by: Tiesha Thompson APRN    Authorized by: Tiesha Thompson APRN  Comparison: compared with previous ECG from 2/15/2024  Comparison to previous ECG: Afib has replaced NSR  Rhythm: atrial fibrillation  Rate: normal  BPM: 69          Advance Care Planning   ACP discussion was declined by the patient. Patient does not have an advance directive, declines further assistance.       1. Persistent atrial fibrillation    2. Cardiomyopathy, dilated    3. Primary hypertension          Plan:  Persistent atrial fibrillation:  -Patient previously failed sotalol with recurrence. Amiodarone stopped due to concern for long term side effects.  -now s/p EPS + PVA, RFA of left atrial roof of the left posterior wall 8/2023  -Recurrence of afib 8/12/24, Coreg increased to 25 mg BID for better rate control and ordered ECV which he will have on Friday.   -anticoagulation: Eliquis, no missed doses.       Dilated cardiomyopathy:  -previous EF 48%: Etiology likely due to A-fib with RVR plus alcohol use.   Echocardiogram 2/1/2024 revealing normal LV function, normal EF normal valvular function.     3.  Hypertension:  -well controlled currently      4.  EtOH:  -Patient has reduced alcohol intake      Electronically signed by SUSANA Villatoro, 08/21/24, 2:08 PM EDT.

## 2024-08-22 ENCOUNTER — PREP FOR SURGERY (OUTPATIENT)
Dept: OTHER | Facility: HOSPITAL | Age: 69
End: 2024-08-22
Payer: MEDICARE

## 2024-08-22 DIAGNOSIS — I48.19 PERSISTENT ATRIAL FIBRILLATION: Primary | ICD-10-CM

## 2024-08-22 RX ORDER — SODIUM CHLORIDE 0.9 % (FLUSH) 0.9 %
3 SYRINGE (ML) INJECTION EVERY 12 HOURS SCHEDULED
Status: CANCELLED | OUTPATIENT
Start: 2024-08-22

## 2024-08-22 RX ORDER — CARVEDILOL 25 MG/1
25 TABLET ORAL 2 TIMES DAILY
Qty: 60 TABLET | Refills: 6 | Status: SHIPPED | OUTPATIENT
Start: 2024-08-22

## 2024-08-22 RX ORDER — SODIUM CHLORIDE 0.9 % (FLUSH) 0.9 %
10 SYRINGE (ML) INJECTION AS NEEDED
Status: CANCELLED | OUTPATIENT
Start: 2024-08-22

## 2024-08-22 RX ORDER — SODIUM CHLORIDE 9 MG/ML
40 INJECTION, SOLUTION INTRAVENOUS AS NEEDED
Status: CANCELLED | OUTPATIENT
Start: 2024-08-22

## 2024-08-22 RX ORDER — NITROGLYCERIN 0.4 MG/1
0.4 TABLET SUBLINGUAL
Status: CANCELLED | OUTPATIENT
Start: 2024-08-22

## 2024-08-22 RX ORDER — ACETAMINOPHEN 325 MG/1
650 TABLET ORAL EVERY 4 HOURS PRN
Status: CANCELLED | OUTPATIENT
Start: 2024-08-22

## 2024-08-23 ENCOUNTER — HOSPITAL ENCOUNTER (OUTPATIENT)
Dept: CARDIOLOGY | Facility: HOSPITAL | Age: 69
Discharge: HOME | End: 2024-08-23
Attending: INTERNAL MEDICINE | Admitting: INTERNAL MEDICINE
Payer: MEDICARE

## 2024-08-23 VITALS
HEART RATE: 58 BPM | SYSTOLIC BLOOD PRESSURE: 141 MMHG | RESPIRATION RATE: 14 BRPM | HEIGHT: 71 IN | TEMPERATURE: 96.8 F | OXYGEN SATURATION: 100 % | BODY MASS INDEX: 28.11 KG/M2 | DIASTOLIC BLOOD PRESSURE: 80 MMHG | WEIGHT: 200.8 LBS

## 2024-08-23 DIAGNOSIS — I48.19 PERSISTENT ATRIAL FIBRILLATION: ICD-10-CM

## 2024-08-23 LAB
ANION GAP SERPL CALCULATED.3IONS-SCNC: 11 MMOL/L (ref 5–15)
BUN SERPL-MCNC: 16 MG/DL (ref 8–23)
BUN/CREAT SERPL: 11.9 (ref 7–25)
CALCIUM SPEC-SCNC: 8.8 MG/DL (ref 8.6–10.5)
CHLORIDE SERPL-SCNC: 103 MMOL/L (ref 98–107)
CO2 SERPL-SCNC: 24 MMOL/L (ref 22–29)
CREAT SERPL-MCNC: 1.35 MG/DL (ref 0.76–1.27)
DEPRECATED RDW RBC AUTO: 45.8 FL (ref 37–54)
EGFRCR SERPLBLD CKD-EPI 2021: 57.9 ML/MIN/1.73
ERYTHROCYTE [DISTWIDTH] IN BLOOD BY AUTOMATED COUNT: 13 % (ref 12.3–15.4)
GLUCOSE SERPL-MCNC: 99 MG/DL (ref 65–99)
HBA1C MFR BLD: 5.1 % (ref 4.8–5.6)
HCT VFR BLD AUTO: 37.5 % (ref 37.5–51)
HGB BLD-MCNC: 12.7 G/DL (ref 13–17.7)
INR PPP: 1.56 (ref 0.89–1.12)
MCH RBC QN AUTO: 32.2 PG (ref 26.6–33)
MCHC RBC AUTO-ENTMCNC: 33.9 G/DL (ref 31.5–35.7)
MCV RBC AUTO: 95.2 FL (ref 79–97)
PLATELET # BLD AUTO: 235 10*3/MM3 (ref 140–450)
PMV BLD AUTO: 9.3 FL (ref 6–12)
POTASSIUM SERPL-SCNC: 4.8 MMOL/L (ref 3.5–5.2)
PROTHROMBIN TIME: 18.8 SECONDS (ref 12.2–14.5)
RBC # BLD AUTO: 3.94 10*6/MM3 (ref 4.14–5.8)
SODIUM SERPL-SCNC: 138 MMOL/L (ref 136–145)
WBC NRBC COR # BLD AUTO: 8.32 10*3/MM3 (ref 3.4–10.8)

## 2024-08-23 PROCEDURE — 85027 COMPLETE CBC AUTOMATED: CPT | Performed by: PHYSICIAN ASSISTANT

## 2024-08-23 PROCEDURE — 25010000002 MIDAZOLAM PER 1 MG: Performed by: INTERNAL MEDICINE

## 2024-08-23 PROCEDURE — 92960 CARDIOVERSION ELECTRIC EXT: CPT

## 2024-08-23 PROCEDURE — 92960 CARDIOVERSION ELECTRIC EXT: CPT | Performed by: INTERNAL MEDICINE

## 2024-08-23 PROCEDURE — 83036 HEMOGLOBIN GLYCOSYLATED A1C: CPT | Performed by: PHYSICIAN ASSISTANT

## 2024-08-23 PROCEDURE — 93005 ELECTROCARDIOGRAM TRACING: CPT | Performed by: INTERNAL MEDICINE

## 2024-08-23 PROCEDURE — 85610 PROTHROMBIN TIME: CPT | Performed by: PHYSICIAN ASSISTANT

## 2024-08-23 PROCEDURE — 36415 COLL VENOUS BLD VENIPUNCTURE: CPT

## 2024-08-23 PROCEDURE — 80048 BASIC METABOLIC PNL TOTAL CA: CPT | Performed by: PHYSICIAN ASSISTANT

## 2024-08-23 PROCEDURE — 25010000002 FENTANYL CITRATE (PF) 50 MCG/ML SOLUTION: Performed by: INTERNAL MEDICINE

## 2024-08-23 RX ORDER — MIDAZOLAM HYDROCHLORIDE 1 MG/ML
INJECTION INTRAMUSCULAR; INTRAVENOUS
Status: COMPLETED | OUTPATIENT
Start: 2024-08-23 | End: 2024-08-23

## 2024-08-23 RX ORDER — MIDAZOLAM HYDROCHLORIDE 1 MG/ML
2-8 INJECTION INTRAMUSCULAR; INTRAVENOUS ONCE AS NEEDED
Status: DISCONTINUED | OUTPATIENT
Start: 2024-08-23 | End: 2024-08-23 | Stop reason: HOSPADM

## 2024-08-23 RX ORDER — ACETAMINOPHEN 325 MG/1
650 TABLET ORAL EVERY 4 HOURS PRN
Status: DISCONTINUED | OUTPATIENT
Start: 2024-08-23 | End: 2024-08-23 | Stop reason: HOSPADM

## 2024-08-23 RX ORDER — ETOMIDATE 2 MG/ML
0.1 INJECTION INTRAVENOUS ONCE AS NEEDED
Status: DISCONTINUED | OUTPATIENT
Start: 2024-08-23 | End: 2024-08-23 | Stop reason: HOSPADM

## 2024-08-23 RX ORDER — FENTANYL CITRATE 50 UG/ML
INJECTION, SOLUTION INTRAMUSCULAR; INTRAVENOUS
Status: COMPLETED | OUTPATIENT
Start: 2024-08-23 | End: 2024-08-23

## 2024-08-23 RX ORDER — NALOXONE HCL 0.4 MG/ML
0.4 VIAL (ML) INJECTION ONCE AS NEEDED
Status: DISCONTINUED | OUTPATIENT
Start: 2024-08-23 | End: 2024-08-23 | Stop reason: HOSPADM

## 2024-08-23 RX ORDER — NITROGLYCERIN 0.4 MG/1
0.4 TABLET SUBLINGUAL
Status: DISCONTINUED | OUTPATIENT
Start: 2024-08-23 | End: 2024-08-23 | Stop reason: HOSPADM

## 2024-08-23 RX ORDER — FLUMAZENIL 0.1 MG/ML
0.5 INJECTION INTRAVENOUS ONCE AS NEEDED
Status: DISCONTINUED | OUTPATIENT
Start: 2024-08-23 | End: 2024-08-23 | Stop reason: HOSPADM

## 2024-08-23 RX ORDER — SODIUM CHLORIDE 9 MG/ML
40 INJECTION, SOLUTION INTRAVENOUS AS NEEDED
Status: DISCONTINUED | OUTPATIENT
Start: 2024-08-23 | End: 2024-08-23 | Stop reason: HOSPADM

## 2024-08-23 RX ORDER — FENTANYL CITRATE 50 UG/ML
50-100 INJECTION, SOLUTION INTRAMUSCULAR; INTRAVENOUS ONCE AS NEEDED
Status: DISCONTINUED | OUTPATIENT
Start: 2024-08-23 | End: 2024-08-23 | Stop reason: HOSPADM

## 2024-08-23 RX ORDER — SODIUM CHLORIDE 0.9 % (FLUSH) 0.9 %
3 SYRINGE (ML) INJECTION EVERY 12 HOURS SCHEDULED
Status: DISCONTINUED | OUTPATIENT
Start: 2024-08-23 | End: 2024-08-23 | Stop reason: HOSPADM

## 2024-08-23 RX ORDER — ETOMIDATE 2 MG/ML
0.05 INJECTION INTRAVENOUS
Status: DISCONTINUED | OUTPATIENT
Start: 2024-08-23 | End: 2024-08-23 | Stop reason: HOSPADM

## 2024-08-23 RX ORDER — SODIUM CHLORIDE 0.9 % (FLUSH) 0.9 %
10 SYRINGE (ML) INJECTION AS NEEDED
Status: DISCONTINUED | OUTPATIENT
Start: 2024-08-23 | End: 2024-08-23 | Stop reason: HOSPADM

## 2024-08-23 RX ADMIN — FENTANYL CITRATE 50 MCG: 50 INJECTION, SOLUTION INTRAMUSCULAR; INTRAVENOUS at 13:56

## 2024-08-23 RX ADMIN — MIDAZOLAM HYDROCHLORIDE 2 MG: 1 INJECTION, SOLUTION INTRAMUSCULAR; INTRAVENOUS at 13:53

## 2024-08-23 RX ADMIN — MIDAZOLAM HYDROCHLORIDE 2 MG: 1 INJECTION, SOLUTION INTRAMUSCULAR; INTRAVENOUS at 13:55

## 2024-08-23 RX ADMIN — FENTANYL CITRATE 50 MCG: 50 INJECTION, SOLUTION INTRAMUSCULAR; INTRAVENOUS at 13:53

## 2024-08-23 NOTE — PROCEDURES
Diagnosis:  Atrial fibrillation     PROCEDURE PERFORMED: External electrical cardioversion.    Anesthesia: Sedation with:  1. 4 mg Versed  2. 100 mcg fentanyl      PROCEDURE IN DETAIL: The patient was brought into the CV[facility] in a fasting  state. The patient was given moderate sedation during which he received 200  joules of external electrical cardioversion that converted atrial  fibrillation to normal sinus heart rhythm.  Patient has been continuously anticoagulated with Eliquis    I supervised and directed an independent trained observer with the assistance of monitoring the patient's level of consciousness and physiological status throughout the procedure.  Intraoperative service time of 10 minutes      IMPRESSION: Successful conversion of atrial fibrillation to normal sinus  heart rhythm.

## 2024-08-23 NOTE — INTERVAL H&P NOTE
H&P reviewed. The patient was examined and there are no changes to the H&P.      Patient underwent successful cardioversion to sinus rhythm.  Will continue current cardiac medications

## 2024-08-26 LAB
QT INTERVAL: 438 MS
QTC INTERVAL: 419 MS

## 2024-08-28 ENCOUNTER — TELEPHONE (OUTPATIENT)
Dept: CARDIOLOGY | Facility: CLINIC | Age: 69
End: 2024-08-28
Payer: MEDICARE

## 2024-08-28 NOTE — TELEPHONE ENCOUNTER
Patient had ECV on 8/23/24. He states his Apple watch has been telling him that his rhythm is inconclusive and he is feeling extremely fatigued and SOB. Denies CP or swelling. He is going to attempt to send apple watch strips through [employer]. Current HR 71 and /70. Patient is also requesting a follow up after ECV. Will have him follow up with Deon Fernandez PA-C

## 2024-08-29 ENCOUNTER — OFFICE VISIT (OUTPATIENT)
Dept: CARDIOLOGY | Facility: CLINIC | Age: 69
End: 2024-08-29
Payer: MEDICARE

## 2024-08-29 VITALS
SYSTOLIC BLOOD PRESSURE: 118 MMHG | OXYGEN SATURATION: 97 % | HEIGHT: 71 IN | WEIGHT: 200.4 LBS | BODY MASS INDEX: 28.06 KG/M2 | DIASTOLIC BLOOD PRESSURE: 66 MMHG | HEART RATE: 56 BPM

## 2024-08-29 DIAGNOSIS — I48.0 PAROXYSMAL ATRIAL FIBRILLATION: Primary | ICD-10-CM

## 2024-08-29 DIAGNOSIS — Z79.01 CHRONIC ANTICOAGULATION: ICD-10-CM

## 2024-08-29 DIAGNOSIS — I10 PRIMARY HYPERTENSION: ICD-10-CM

## 2024-08-29 RX ORDER — CARVEDILOL 25 MG/1
12.5 TABLET ORAL 2 TIMES DAILY
Qty: 60 TABLET | Refills: 6 | Status: SHIPPED | OUTPATIENT
Start: 2024-08-29

## 2024-08-29 NOTE — PROGRESS NOTES
Jason Lane  1955  [phone]      [facility] CARDIOLOGY     Blues, CINDY BensonC  [address] [facility] [address] [place], [place] [id number]    Chief Complaint   Patient presents with    Paroxysmal atrial fibrillation     Problem List:   Persistent Atrial fibrillation:  Rapid ventricular response of unknown duration, hospitalized on 01/01/2012.   Pradaxa and sotalol initiated, 01/02/2012.   Echocardiogram, January 2012 with mild mitral regurgitation, ejection fraction 35% to 40%.   Echocardiogram, May 2012: Left ventricular ejection fraction of 45% to 50%, mild TR.   Echocardiogram, 12/15/2014: EF 50% to 55%. Mild-to-moderate TR.  Sotalol discontinued   AMY guided ECV with initiation of Amiodarone 5/13/2022  Amiodarone discontinued 5/2023   s/p EPS + PVA, RFA of left atrial roof of the left posterior wall 8/15/23  Dilated cardiomyopathy new onset 5/13/2022 diagnosed by AMY guided cardioversion  Started on amiodarone   AMY 5/13/2022: EF 48%  Alcohol abuse.   Tobacco abuse.   Hypertension.   Acute systolic congestive heart failure secondary to atrial fibrillation with rapid ventricular response.   Syncope:  Left and right heart catheterization, Dr. Larson, 03/15/2013, with normal pressures and normal coronary arteries: EF 55%       Allergies  No Known Allergies    Current Medications    Current Outpatient Medications:     albuterol sulfate  (90 Base) MCG/ACT inhaler, Inhale 2 puffs Every 4 (Four) Hours As Needed for Wheezing., Disp: , Rfl:     apixaban (ELIQUIS) 5 MG tablet tablet, Take 1 tablet by mouth Every 12 (Twelve) Hours. First dose tomorrow, Disp: 60 tablet, Rfl: 6    brimonidine (ALPHAGAN) 0.2 % ophthalmic solution, Administer 1 drop to both eyes 2 (Two) Times a Day., Disp: , Rfl:     carvedilol (COREG) 25 MG tablet, Take 0.5 tablets by mouth 2 (Two) Times a Day., Disp: 60 tablet, Rfl: 6    dorzolamide (TRUSOPT) 2 % ophthalmic solution, Administer 1 drop to both eyes 2 (Two)  "Times a Day., Disp: , Rfl:     doxazosin (CARDURA) 2 MG tablet, Take 1/2 tablet by mouth Every Night. For BP and prostate, Disp: 15 tablet, Rfl: 6    Fluticasone-Umeclidin-Vilant (Trelegy Ellipta) 100-62.5-25 MCG/ACT inhaler, Inhale 1 puff Daily., Disp: 1 each, Rfl: 11    latanoprost (XALATAN) 0.005 % ophthalmic solution, Administer 1 drop to both eyes Every Night., Disp: , Rfl:     lisinopril (PRINIVIL,ZESTRIL) 40 MG tablet, Take 1 tablet by mouth Daily., Disp: 30 tablet, Rfl: 6    timolol (TIMOPTIC) 0.5 % ophthalmic solution, Administer 1 drop to both eyes Daily., Disp: , Rfl:     History of Present Illness     68 year old  presents for follow up of persistent afib s/p PVA 8/15/23, dilated CM, HTN.  He has had recurrent afib in setting of ETOH use. He is s/p ECV 8/23/2024. He has cut back ETOH use.  He thinks he is mostly been staying in sinus rhythm.  He does have Apple Watch that sometimes tells him that his heartbeat is interpretable.  He has not noticed rapid heartbeats..  He does continue to have some fatigue and low blood pressure.  He was originally increased his Coreg 25 twice daily but he is not able to tolerate this therefore he is reduced to back down to 12.5 m twice daily.  His energy level is has not quite come back yet since his last cardioversion.      Vitals:    08/29/24 1122   BP: 118/66   BP Location: Right arm   Patient Position: Sitting   Pulse: 56   SpO2: 97%   Weight: 90.9 kg (200 lb 6.4 oz)   Height: 180.3 cm (71\")       Body mass index is 27.95 kg/m².  PE:  General: NAD  Neck: no JVD, no carotid bruits, no TM  Heart IRR,IRR, NL S1, S2, S4 present, no rubs, murmurs  Lungs: CTA, no wheezes, rhonchi, or rales  Abd: soft, non-tender, NL BS  Ext: No musculoskeletal deformities, no edema, cyanosis, or clubbing  Psych: normal mood and affect    Diagnostic Data:        ECG 12 Lead    Date/Time: 8/29/2024 11:33 AM  Performed by: Hernan Fernandez PA    Authorized by: Hernan Fernandez PA  " Comparison: compared with previous ECG from 8/26/2024  Similar to previous ECG  Rhythm: sinus rhythm  Rate: normal  Conduction: conduction normal  ST Segments: ST segments normal  T Waves: T waves normal  QRS axis: normal    Clinical impression: normal ECG          Advance Care Planning   ACP discussion was declined by the patient. Patient does not have an advance directive, declines further assistance.       1. Paroxysmal atrial fibrillation    2. Primary hypertension    3. Chronic anticoagulation            Plan:  Persistent atrial fibrillation:  -Patient previously failed sotalol with recurrence. Amiodarone stopped due to concern for long term side effects.  -now s/p EPS + PVA, RFA of left atrial roof of the left posterior wall 8/2023  -Recurrence of afib 8/12/24, Coreg increased to 25 mg BID for better rate control . ECV 8/23/2024-NSR currently.  Intolerant to higher dose Coreg due to marginal blood pressure will reduce back down to 12.5 mg twice daily.     -anticoagulation: Eliquis, no missed doses.       Dilated cardiomyopathy:  -previous EF 48%: Etiology likely due to A-fib with RVR plus alcohol use.   Echocardiogram 2/1/2024 revealing normal LV function, normal EF normal valvular function.     3.  Hypertension:  -well controlled currently, recently marginal in nature after increasing Coreg to 25 twice daily will reduce to 12.5 twice daily.     4.  EtOH:  -Patient has reduced alcohol intake    EKG today normal sinus rhythm.  Apple watch recordings reviewed this reveals sinus rhythm but occasional PACs and PVCs.  He does have some fatiguing on the higher dose of Coreg we will try a lower dose if it improves this.  Return follow-up or office in 6 months or sooner as needed.  Electronically signed by WILLIAM Salter, 08/29/24, 11:33 AM EDT.

## 2024-09-05 RX ORDER — APIXABAN 5 MG/1
5 TABLET, FILM COATED ORAL EVERY 12 HOURS
Qty: 60 TABLET | Refills: 6 | Status: SHIPPED | OUTPATIENT
Start: 2024-09-05

## 2024-09-19 ENCOUNTER — OFFICE VISIT (OUTPATIENT)
Dept: FAMILY MEDICINE CLINIC | Facility: CLINIC | Age: 69
End: 2024-09-19
Payer: MEDICARE

## 2024-09-19 VITALS
HEART RATE: 78 BPM | OXYGEN SATURATION: 95 % | WEIGHT: 199.8 LBS | BODY MASS INDEX: 27.97 KG/M2 | SYSTOLIC BLOOD PRESSURE: 136 MMHG | TEMPERATURE: 98.8 F | DIASTOLIC BLOOD PRESSURE: 72 MMHG | HEIGHT: 71 IN

## 2024-09-19 DIAGNOSIS — R52 BODY ACHES: ICD-10-CM

## 2024-09-19 DIAGNOSIS — J44.9 COPD MIXED TYPE: ICD-10-CM

## 2024-09-19 DIAGNOSIS — J40 BRONCHITIS: ICD-10-CM

## 2024-09-19 DIAGNOSIS — R05.9 COUGH, UNSPECIFIED TYPE: Primary | ICD-10-CM

## 2024-09-19 PROCEDURE — 99214 OFFICE O/P EST MOD 30 MIN: CPT | Performed by: PHYSICIAN ASSISTANT

## 2024-09-19 PROCEDURE — 3078F DIAST BP <80 MM HG: CPT | Performed by: PHYSICIAN ASSISTANT

## 2024-09-19 PROCEDURE — 1159F MED LIST DOCD IN RCRD: CPT | Performed by: PHYSICIAN ASSISTANT

## 2024-09-19 PROCEDURE — 3075F SYST BP GE 130 - 139MM HG: CPT | Performed by: PHYSICIAN ASSISTANT

## 2024-09-19 PROCEDURE — 1160F RVW MEDS BY RX/DR IN RCRD: CPT | Performed by: PHYSICIAN ASSISTANT

## 2024-09-19 PROCEDURE — 87428 SARSCOV & INF VIR A&B AG IA: CPT | Performed by: PHYSICIAN ASSISTANT

## 2024-09-19 PROCEDURE — 1125F AMNT PAIN NOTED PAIN PRSNT: CPT | Performed by: PHYSICIAN ASSISTANT

## 2024-09-19 PROCEDURE — G2211 COMPLEX E/M VISIT ADD ON: HCPCS | Performed by: PHYSICIAN ASSISTANT

## 2024-09-19 RX ORDER — SULFAMETHOXAZOLE/TRIMETHOPRIM 800-160 MG
1 TABLET ORAL 2 TIMES DAILY
Qty: 20 TABLET | Refills: 0 | Status: SHIPPED | OUTPATIENT
Start: 2024-09-19

## 2024-09-19 RX ORDER — DEXTROMETHORPHAN HYDROBROMIDE AND PROMETHAZINE HYDROCHLORIDE 15; 6.25 MG/5ML; MG/5ML
5 SYRUP ORAL 4 TIMES DAILY PRN
Qty: 180 ML | Refills: 1 | Status: SHIPPED | OUTPATIENT
Start: 2024-09-19

## 2024-09-19 RX ORDER — PREDNISONE 20 MG/1
TABLET ORAL
Qty: 12 TABLET | Refills: 0 | Status: SHIPPED | OUTPATIENT
Start: 2024-09-19

## 2024-11-01 ENCOUNTER — HOSPITAL ENCOUNTER (OUTPATIENT)
Dept: CT IMAGING | Facility: HOSPITAL | Age: 69
Discharge: HOME | End: 2024-11-01
Payer: MEDICARE

## 2024-11-01 DIAGNOSIS — R91.1 PULMONARY NODULE: ICD-10-CM

## 2024-11-01 PROCEDURE — 71250 CT THORAX DX C-: CPT

## 2024-11-20 ENCOUNTER — CLINICAL SUPPORT (OUTPATIENT)
Dept: CARDIOLOGY | Facility: CLINIC | Age: 69
End: 2024-11-20
Payer: MEDICARE

## 2024-11-20 ENCOUNTER — TELEPHONE (OUTPATIENT)
Dept: CARDIOLOGY | Facility: CLINIC | Age: 69
End: 2024-11-20
Payer: MEDICARE

## 2024-11-20 DIAGNOSIS — I48.19 PERSISTENT ATRIAL FIBRILLATION: Primary | ICD-10-CM

## 2024-11-20 NOTE — TELEPHONE ENCOUNTER
Patient daughter called regarding [facility] admission. She would like to know what the side effects are with with alcohol as patient drinks 4-6 beers daily. She also is concerned about the hospital stay and patient going in to withdrawal.

## 2024-11-20 NOTE — TELEPHONE ENCOUNTER
He is back in AFIB. He is going to need an antiarrythmic as he just had a cardioversion in August. Would recommend [facility] admission.

## 2024-11-20 NOTE — TELEPHONE ENCOUNTER
Patient called stating he thinks he is back in afib. He is SOB. Denies CP or swelling. HR 93 and /73. Taking medications as prescribed.  He is coming in to get an EKG.

## 2024-11-20 NOTE — TELEPHONE ENCOUNTER
Spoke with patient about [facility] admission. He is going to get with his wife and daughter and call me back.

## 2024-11-21 NOTE — TELEPHONE ENCOUNTER
Ok, I disucssed this situation with Dr. Goff and he said to put him on for redo PVA with PFA. Cancel plans for [facility] admission. Stay on anticoagulation. No meds to hold.

## 2024-11-21 NOTE — TELEPHONE ENCOUNTER
Spoke with patient and daughter. They are agreeable to redo PVA. [facility] admission canceled. Orders placed for PVA.

## 2024-12-05 ENCOUNTER — OFFICE VISIT (OUTPATIENT)
Dept: PULMONOLOGY | Facility: CLINIC | Age: 69
End: 2024-12-05
Payer: MEDICARE

## 2024-12-05 VITALS
WEIGHT: 204.13 LBS | HEART RATE: 66 BPM | TEMPERATURE: 98 F | DIASTOLIC BLOOD PRESSURE: 74 MMHG | BODY MASS INDEX: 28.58 KG/M2 | SYSTOLIC BLOOD PRESSURE: 122 MMHG | HEIGHT: 71 IN | RESPIRATION RATE: 16 BRPM | OXYGEN SATURATION: 96 %

## 2024-12-05 DIAGNOSIS — R06.09 DOE (DYSPNEA ON EXERTION): Primary | ICD-10-CM

## 2024-12-05 DIAGNOSIS — Z79.01 CHRONIC ANTICOAGULATION: ICD-10-CM

## 2024-12-05 DIAGNOSIS — I48.19 PERSISTENT ATRIAL FIBRILLATION: ICD-10-CM

## 2024-12-05 DIAGNOSIS — Z87.891 FORMER SMOKER: ICD-10-CM

## 2024-12-05 DIAGNOSIS — J44.9 COPD MIXED TYPE: ICD-10-CM

## 2024-12-05 RX ORDER — KETOCONAZOLE 20 MG/ML
SHAMPOO, SUSPENSION TOPICAL WEEKLY
COMMUNITY
Start: 2024-11-15

## 2024-12-05 RX ORDER — LISINOPRIL 20 MG/1
1 TABLET ORAL EVERY 12 HOURS SCHEDULED
COMMUNITY
Start: 2024-11-15

## 2024-12-05 NOTE — PROGRESS NOTES
PULMONARY  NOTE    Chief Complaint     Stage IV COPD, former smoker, pulmonary nodules, cardiomyopathy, persistent atrial fibrillation, anticoagulation    History of Present Illness     69-year-old male returns today for follow-up  I last saw him 7/31/2024    He has a 90-pack-year smoking history, resolved in January 2012    He has stage IV, very severe, chronic obstructive pulmonary disease  He remains on Trelegy with albuterol  No recent acute exacerbation    We have been following serial chest imaging for pulmonary nodules  Most recent CT scan of the chest is as noted below    He has a history of a cardiomyopathy and permanent atrial fibrillation  He scheduled for an ablation procedure    Patient Active Problem List   Diagnosis    Anemia    Atrial fibrillation    Benign prostatic hyperplasia    Reduced libido    Hypertension    Seasonal allergic rhinitis    Eczema    Alcohol abuse    Chronic low back pain without sciatica    Cardiomyopathy, dilated    Long term current use of antiarrhythmic drug    Chronic anticoagulation    Primary open angle glaucoma (POAG) of both eyes, indeterminate stage    Stage IV (Very severe) COPD    Former smoker (Stopped 2012)    Pulmonary nodule (LLL pleural based)    JONES (dyspnea on exertion)      No Known Allergies    Current Outpatient Medications:     albuterol sulfate  (90 Base) MCG/ACT inhaler, Inhale 2 puffs Every 4 (Four) Hours As Needed for Wheezing., Disp: , Rfl:     brimonidine (ALPHAGAN) 0.2 % ophthalmic solution, Administer 1 drop to both eyes 2 (Two) Times a Day., Disp: , Rfl:     carvedilol (COREG) 25 MG tablet, Take 0.5 tablets by mouth 2 (Two) Times a Day., Disp: 60 tablet, Rfl: 6    dorzolamide (TRUSOPT) 2 % ophthalmic solution, Administer 1 drop to both eyes 2 (Two) Times a Day., Disp: , Rfl:     doxazosin (CARDURA) 2 MG tablet, Take 1/2 tablet by mouth Every Night. For BP and prostate, Disp: 15 tablet, Rfl: 6    Eliquis 5 MG tablet tablet, Take 1 tablet by  mouth Every 12 (Twelve) Hours., Disp: 60 tablet, Rfl: 6    Fluticasone-Umeclidin-Vilant (Trelegy Ellipta) 100-62.5-25 MCG/ACT inhaler, Inhale 1 puff Daily., Disp: 1 each, Rfl: 11    ketoconazole (NIZORAL) 2 % shampoo, Apply  topically to the appropriate area as directed 1 (One) Time Per Week., Disp: , Rfl:     latanoprost (XALATAN) 0.005 % ophthalmic solution, Administer 1 drop to both eyes Every Night., Disp: , Rfl:     lisinopril (PRINIVIL,ZESTRIL) 20 MG tablet, Take 1 tablet by mouth Every 12 (Twelve) Hours., Disp: , Rfl:     lisinopril (PRINIVIL,ZESTRIL) 40 MG tablet, Take 1 tablet by mouth Daily., Disp: 30 tablet, Rfl: 6    predniSONE (DELTASONE) 20 MG tablet, Take one twice daily x 5 days then once daily x 2 days, Disp: 12 tablet, Rfl: 0    promethazine-dextromethorphan (PROMETHAZINE-DM) 6.25-15 MG/5ML syrup, Take 5 mL by mouth 4 (Four) Times a Day As Needed for Cough., Disp: 180 mL, Rfl: 1    sulfamethoxazole-trimethoprim (Bactrim DS) 800-160 MG per tablet, Take 1 tablet by mouth 2 (Two) Times a Day., Disp: 20 tablet, Rfl: 0    timolol (TIMOPTIC) 0.5 % ophthalmic solution, Administer 1 drop to both eyes Daily., Disp: , Rfl:   MEDICATION LIST AND ALLERGIES REVIEWED.    Family History   Problem Relation Age of Onset    Heart disease Mother     Thyroid disease Mother     Hyperlipidemia Mother     Kidney disease Mother     Heart disease Maternal Uncle     Heart disease Paternal Uncle     Anemia Other         cousins     Social History     Tobacco Use    Smoking status: Former     Current packs/day: 0.00     Average packs/day: 1 pack/day for 42.0 years (42.0 ttl pk-yrs)     Types: Cigarettes     Start date:      Quit date: 2012     Years since quittin.9     Passive exposure: Past    Smokeless tobacco: Never   Vaping Use    Vaping status: Never Used   Substance Use Topics    Alcohol use: Yes     Comment: 4 per day    Drug use: No     Social History     Social History Narrative        Smoked up  "through 2012    Smoked up to 3 packs of cigarettes per day    Drinks 35 beers on a weekly basis    Has not worked around asbestos     FAMILY AND SOCIAL HISTORY REVIEWED.    Review of Systems  IF PRESENT REFER TO SCANNED ROS SHEET FROM SAME DATE  OTHERWISE ROS OBTAINED AND NON-CONTRIBUTORY OVER HPI.    /74   Pulse 66   Temp 98 °F (36.7 °C)   Resp 16   Ht 180.3 cm (70.98\")   Wt 92.6 kg (204 lb 2 oz)   SpO2 96% Comment: room air at resting  BMI 28.48 kg/m²   Physical Exam  Vitals and nursing note reviewed.   Constitutional:       General: He is not in acute distress.     Appearance: He is well-developed. He is not diaphoretic.   HENT:      Head: Normocephalic and atraumatic.   Neck:      Thyroid: No thyromegaly.   Cardiovascular:      Rate and Rhythm: Normal rate. Rhythm irregular.      Heart sounds: No murmur heard.  Pulmonary:      Effort: Pulmonary effort is normal.      Breath sounds: Normal breath sounds. No stridor.   Lymphadenopathy:      Cervical: No cervical adenopathy.      Upper Body:      Right upper body: No supraclavicular or epitrochlear adenopathy.      Left upper body: No supraclavicular or epitrochlear adenopathy.   Skin:     General: Skin is warm and dry.   Neurological:      Mental Status: He is alert and oriented to person, place, and time.   Psychiatric:         Behavior: Behavior normal.         Results     CT scan of the chest from 11/1/2024 revealed interval improvement in the left lower lobe opacity with no new or progressive intrathoracic findings    Immunization History   Administered Date(s) Administered    COVID-19 (PFIZER) Purple Cap Monovalent 03/17/2021, 04/07/2021     Problem List       ICD-10-CM ICD-9-CM   1. JONES (dyspnea on exertion)  R06.09 786.09   2. Former smoker (Stopped 2012)  Z87.891 V15.82   3. Stage IV (Very severe) COPD  J44.9 496   4. Chronic anticoagulation  Z79.01 V58.61   5. Persistent atrial fibrillation  I48.19 427.31       Discussion     We reviewed his " chest imaging  Stable  Would recommend a follow-up CT scan of the chest at least in 1 year    He is good to remain on Trelegy with albuterol as needed    From a pulmonary standpoint no contraindication to him undergoing his ablation procedure    I will plan to see him back in 6 months or earlier if there are any problems in the meantime    Moderate level of Medical Decision Making complexity based on 2 or more chronic stable illnesses and an independent review of test results and/or prescription drug management.    Ash Mancilla MD  Note electronically signed    CC: Anali Weinberg PA-C

## 2024-12-09 ENCOUNTER — TELEPHONE (OUTPATIENT)
Dept: CARDIOLOGY | Facility: CLINIC | Age: 69
End: 2024-12-09
Payer: MEDICARE

## 2024-12-09 DIAGNOSIS — R91.1 PULMONARY NODULE: Primary | ICD-10-CM

## 2024-12-09 NOTE — TELEPHONE ENCOUNTER
Patient states that he has been in Afib for the past 2 weeks. He said that his HR has been ranging between 120-150 bpm for the past 3 days. He has not been checking his BP regularly. He states that he feels tired. He denies chest pain, shortness of breath or edema. He is taking all of his medications as prescribed. He states that he recently lost his grandson and that is when he started having episodes of Afib.    He is not home currently. He is going to check his BP and HR when he gets home and call me back with the readings.    He is aware to go to the closest ER to be evaluated if he becomes more symptomatic.

## 2024-12-10 NOTE — TELEPHONE ENCOUNTER
Please increase Coreg to 25 mg BID. Does Dr. Horvath or Ebenezer have any sooner ablations available on their schedule?

## 2024-12-10 NOTE — TELEPHONE ENCOUNTER
I called and instructed the patient to increase Coreg to 25 mg PO BID. He is agreeable and will call me back on Thursday with an update.        Dr. Sol's first available opening for a PVA is on 2/26/24 and Dr. Horvath's is on 1/21/24.

## 2024-12-10 NOTE — TELEPHONE ENCOUNTER
Ok either one of those is fine for ablation. I discussed with Dr. Goff as well. He is fine with Alexandru or Ebenezer doing the ablation

## 2024-12-11 ENCOUNTER — OFFICE VISIT (OUTPATIENT)
Dept: FAMILY MEDICINE CLINIC | Facility: CLINIC | Age: 69
End: 2024-12-11
Payer: MEDICARE

## 2024-12-11 ENCOUNTER — TELEPHONE (OUTPATIENT)
Dept: FAMILY MEDICINE CLINIC | Facility: CLINIC | Age: 69
End: 2024-12-11

## 2024-12-11 VITALS
WEIGHT: 201.6 LBS | HEIGHT: 71 IN | SYSTOLIC BLOOD PRESSURE: 110 MMHG | BODY MASS INDEX: 28.22 KG/M2 | TEMPERATURE: 98 F | DIASTOLIC BLOOD PRESSURE: 64 MMHG

## 2024-12-11 DIAGNOSIS — J40 BRONCHITIS: Primary | ICD-10-CM

## 2024-12-11 PROCEDURE — 3078F DIAST BP <80 MM HG: CPT | Performed by: PHYSICIAN ASSISTANT

## 2024-12-11 PROCEDURE — 99213 OFFICE O/P EST LOW 20 MIN: CPT | Performed by: PHYSICIAN ASSISTANT

## 2024-12-11 PROCEDURE — 1125F AMNT PAIN NOTED PAIN PRSNT: CPT | Performed by: PHYSICIAN ASSISTANT

## 2024-12-11 PROCEDURE — 3074F SYST BP LT 130 MM HG: CPT | Performed by: PHYSICIAN ASSISTANT

## 2024-12-11 RX ORDER — PREDNISONE 10 MG/1
10 TABLET ORAL DAILY
Qty: 14 TABLET | Refills: 0 | Status: SHIPPED | OUTPATIENT
Start: 2024-12-11

## 2024-12-11 RX ORDER — DOXYCYCLINE 100 MG/1
100 CAPSULE ORAL 2 TIMES DAILY
Qty: 20 CAPSULE | Refills: 0 | Status: SHIPPED | OUTPATIENT
Start: 2024-12-11

## 2024-12-11 RX ORDER — PROMETHAZINE HYDROCHLORIDE 25 MG/1
25 TABLET ORAL EVERY 6 HOURS PRN
Qty: 20 TABLET | Refills: 1 | Status: SHIPPED | OUTPATIENT
Start: 2024-12-11

## 2024-12-11 NOTE — PROGRESS NOTES
Subjective   Jason Lane is a 69 y.o. male  Nausea (Unable to take Zofran d/t Afib and is out of promethazine), Constipation, and Cough      Nausea  Symptoms include cough and nausea.    Pertinent negative symptoms include no chest pain, no chills, no fatigue and no fever.   Constipation  Associated symptoms include nausea. Pertinent negatives include no fever.   Cough  Associated symptoms include wheezing. Pertinent negatives include no chest pain, chills, fever or shortness of breath.     History of Present Illness  Patient is a pleasant 69-year-old male who comes in with nausea upset stomach cough congestion coughing up green-yellow sputum mild sore throat wheezing and chest congestion frontal headache mild sore throat.    The following portions of the patient's history were reviewed and updated as appropriate: allergies, current medications, past social history and problem list    Review of Systems   Constitutional:  Negative for chills, fatigue and fever.   HENT: Negative.     Respiratory:  Positive for cough, chest tightness and wheezing. Negative for shortness of breath.    Cardiovascular:  Negative for chest pain.   Gastrointestinal:  Positive for constipation and nausea.       Objective     Vitals:    12/11/24 1342   BP: 110/64   Temp: 98 °F (36.7 °C)       Physical Exam  Vitals and nursing note reviewed.   Constitutional:       General: He is not in acute distress.     Appearance: He is well-developed. He is not diaphoretic.   HENT:      Head: Normocephalic and atraumatic.      Nose: Nose normal.   Neck:      Vascular: No JVD.   Cardiovascular:      Rate and Rhythm: Normal rate and regular rhythm.      Heart sounds: Normal heart sounds. No murmur heard.  Pulmonary:      Effort: Pulmonary effort is normal. No respiratory distress.      Breath sounds: No stridor. Wheezing present.   Musculoskeletal:      Cervical back: Neck supple.   Lymphadenopathy:      Cervical: No cervical adenopathy.       Physical  Exam      Assessment & Plan   Assessment & Plan      Diagnoses and all orders for this visit:    1. Bronchitis (Primary)  -     doxycycline (MONODOX) 100 MG capsule; Take 1 capsule by mouth 2 (Two) Times a Day.  Dispense: 20 capsule; Refill: 0  -     predniSONE (DELTASONE) 10 MG tablet; Take 1 tablet by mouth Daily.  Dispense: 14 tablet; Refill: 0  -     promethazine (PHENERGAN) 25 MG tablet; Take 1 tablet by mouth Every 6 (Six) Hours As Needed for Nausea or Vomiting.  Dispense: 20 tablet; Refill: 1       I spent 15 minutes in patient care: Reviewing records prior to the visit, examining the patient, entering orders and documentation    Part of this note may be an electronic transcription/translation of spoken language to printed text using the Dragon Dictation System.     Patient or patient representative verbalized consent for the use of Ambient Listening during the visit with  WILLIAM Patel for chart documentation. 12/11/2024  15:33 EST

## 2024-12-11 NOTE — TELEPHONE ENCOUNTER
Hub staff attempted to follow warm transfer process and was unsuccessful     Caller: Melina Lane    Relationship to patient: Emergency Contact    Best call back number: [phone]    Patient is needing: PATIENT WOULD LIKE TO BE SEEN TODAY FOR NAUSEA AND OTHER COLD LIKE SYMPTOMS. PLEASE CALL SPOUSE BACK

## 2024-12-12 RX ORDER — CARVEDILOL 25 MG/1
25 TABLET ORAL 2 TIMES DAILY
Qty: 60 TABLET | Refills: 6 | Status: SHIPPED | OUTPATIENT
Start: 2024-12-12

## 2024-12-13 NOTE — NURSING NOTE
PRE-PVA ASSESSMENT  Jason Lane 1955   [address] [place] [address] [address] [place] [address]   [phone]        PCP: Anali Weinberg PA-C   Information obtained from: [x] Medical record review  [x] Patient report  Scheduled for: PVA w/ PFA on 01/21/2025 with Dr. Horvath   Allergies   No Known Allergies        AFib Specific History:  AFIBTYPE: paroxysmal     CHADS-VASc Risk Assessment                 3 Total Score     1 CHF     1 Hypertension     1 Age 65-74           Criteria that do not apply:     Age >/= 75     DM     PRIOR STROKE/TIA/THROMBO     Vascular Disease     Sex: Female                Anticoagulation: Eliquis 5 mg BID, NO MISSED DOSES.   Cardioversion x 1  Failed AAD(s): amiodarone   Prior Ablation: 08/13/2023 with Dr. Goff       Is Mr. Lane aware of his AFib? Yes              Onset: 2012                                                    Exacerbations: none              Frequency: daily         Alleviations: ECV                      Duration: days                Symptoms:              [] Palpitations:                       [] Chest Discomfort:                [] Dizziness:                           [] Presyncope:               [] Lightheadedness:               [] Syncope:               [x] Fatigue:                              [x] Other: weak              [] Short of Breath:      Last Echo(s):    Results for orders placed during the hospital encounter of 02/01/24    Adult Transthoracic Echo Complete w/ Color, Spectral and Contrast if necessary per protocol    Interpretation Summary    Left ventricular systolic function is normal. Estimated left ventricular EF = 55%    Left ventricular diastolic function is consistent with (grade I) impaired relaxation.    The cardiac valves are anatomically and functionally normal.    Estimated right ventricular systolic pressure from tricuspid regurgitation is normal (<35 mmHg).    Compared to echocardiogram performed 8/16/2022, there has been no significant  change.        Past medical History:               [] Diabetes     No                                                     2024: HgbA1c: 5.10       [] HYPOthyroidism No  [] HYPERthyroidism No   TSH Results:  Lab Results   Component Value Date    TSH 0.854 08/15/2023    TSH 1.740 08/16/2022    TSH 0.812 04/27/2022    TSH 0.774 09/17/2021    TSH 1.779 09/16/2015               [x] HTN                   [x] Tx lisinopril, Coreg, Cardura             [x] heart failure- cardiomyopathy    [] CVA     No                           [] TIA     No             [] CAD     No               [] MI   No               [] Dyslipidemia No   [] Statin indicated NO      [x] Ischemic Evaluation       [] Stress Test: No        [x] Heart Cath: Left and right heart catheterization, Dr. Larson, 03/15/2013, with normal pressures and normal coronary arteries: EF 55%      [] Sleep Apnea Suspected    No      [] Obesity No BMI 27.42       Summary of Patient Contact:      I spoke with Mr. Lane about his upcoming PVA.   He was well informed about the procedure from prior discussion with Dr. Horvath and from reading the provided literature.  We discussed the procedure at length including risks, anesthesia, intra-op procedures, recovery, bedrest, normal post-procedure expectations, and success rates.  I answered a few remaining questions. Mr. Lane verbalized understanding and he is ready to proceed.

## 2024-12-17 ENCOUNTER — HOSPITAL ENCOUNTER (EMERGENCY)
Facility: HOSPITAL | Age: 69
Discharge: HOME | End: 2024-12-17
Attending: EMERGENCY MEDICINE | Admitting: EMERGENCY MEDICINE
Payer: MEDICARE

## 2024-12-17 ENCOUNTER — APPOINTMENT (OUTPATIENT)
Dept: GENERAL RADIOLOGY | Facility: HOSPITAL | Age: 69
End: 2024-12-17
Payer: MEDICARE

## 2024-12-17 VITALS
SYSTOLIC BLOOD PRESSURE: 114 MMHG | RESPIRATION RATE: 16 BRPM | DIASTOLIC BLOOD PRESSURE: 75 MMHG | OXYGEN SATURATION: 100 % | BODY MASS INDEX: 28.28 KG/M2 | TEMPERATURE: 97.5 F | WEIGHT: 202 LBS | HEART RATE: 70 BPM | HEIGHT: 71 IN

## 2024-12-17 DIAGNOSIS — I48.91 ATRIAL FIBRILLATION WITH RVR: Primary | ICD-10-CM

## 2024-12-17 LAB
ALBUMIN SERPL-MCNC: 4.1 G/DL (ref 3.5–5.2)
ALBUMIN/GLOB SERPL: 1.6 G/DL
ALP SERPL-CCNC: 52 U/L (ref 39–117)
ALT SERPL W P-5'-P-CCNC: 12 U/L (ref 1–41)
ANION GAP SERPL CALCULATED.3IONS-SCNC: 12 MMOL/L (ref 5–15)
AST SERPL-CCNC: 13 U/L (ref 1–40)
BASOPHILS # BLD AUTO: 0.03 10*3/MM3 (ref 0–0.2)
BASOPHILS NFR BLD AUTO: 0.3 % (ref 0–1.5)
BILIRUB SERPL-MCNC: 1 MG/DL (ref 0–1.2)
BUN SERPL-MCNC: 29 MG/DL (ref 8–23)
BUN/CREAT SERPL: 14.1 (ref 7–25)
CALCIUM SPEC-SCNC: 9.4 MG/DL (ref 8.6–10.5)
CHLORIDE SERPL-SCNC: 97 MMOL/L (ref 98–107)
CO2 SERPL-SCNC: 19 MMOL/L (ref 22–29)
CREAT SERPL-MCNC: 2.05 MG/DL (ref 0.76–1.27)
DEPRECATED RDW RBC AUTO: 47.3 FL (ref 37–54)
EGFRCR SERPLBLD CKD-EPI 2021: 34.9 ML/MIN/1.73
EOSINOPHIL # BLD AUTO: 0.01 10*3/MM3 (ref 0–0.4)
EOSINOPHIL NFR BLD AUTO: 0.1 % (ref 0.3–6.2)
ERYTHROCYTE [DISTWIDTH] IN BLOOD BY AUTOMATED COUNT: 13.5 % (ref 12.3–15.4)
GEN 5 1HR TROPONIN T REFLEX: 18 NG/L
GLOBULIN UR ELPH-MCNC: 2.6 GM/DL
GLUCOSE SERPL-MCNC: 121 MG/DL (ref 65–99)
HCT VFR BLD AUTO: 37.1 % (ref 37.5–51)
HGB BLD-MCNC: 12.5 G/DL (ref 13–17.7)
IMM GRANULOCYTES # BLD AUTO: 0.08 10*3/MM3 (ref 0–0.05)
IMM GRANULOCYTES NFR BLD AUTO: 0.7 % (ref 0–0.5)
LYMPHOCYTES # BLD AUTO: 1.35 10*3/MM3 (ref 0.7–3.1)
LYMPHOCYTES NFR BLD AUTO: 12.6 % (ref 19.6–45.3)
MAGNESIUM SERPL-MCNC: 1.6 MG/DL (ref 1.6–2.4)
MCH RBC QN AUTO: 31.8 PG (ref 26.6–33)
MCHC RBC AUTO-ENTMCNC: 33.7 G/DL (ref 31.5–35.7)
MCV RBC AUTO: 94.4 FL (ref 79–97)
MONOCYTES # BLD AUTO: 0.38 10*3/MM3 (ref 0.1–0.9)
MONOCYTES NFR BLD AUTO: 3.6 % (ref 5–12)
NEUTROPHILS NFR BLD AUTO: 8.83 10*3/MM3 (ref 1.7–7)
NEUTROPHILS NFR BLD AUTO: 82.7 % (ref 42.7–76)
NRBC BLD AUTO-RTO: 0 /100 WBC (ref 0–0.2)
NT-PROBNP SERPL-MCNC: 5820 PG/ML (ref 0–900)
PLAT MORPH BLD: NORMAL
PLATELET # BLD AUTO: 256 10*3/MM3 (ref 140–450)
PMV BLD AUTO: 10.1 FL (ref 6–12)
POTASSIUM SERPL-SCNC: 4.2 MMOL/L (ref 3.5–5.2)
PROT SERPL-MCNC: 6.7 G/DL (ref 6–8.5)
RBC # BLD AUTO: 3.93 10*6/MM3 (ref 4.14–5.8)
RBC MORPH BLD: NORMAL
SODIUM SERPL-SCNC: 128 MMOL/L (ref 136–145)
TROPONIN T NUMERIC DELTA: 0 NG/L
TROPONIN T SERPL HS-MCNC: 18 NG/L
TSH SERPL DL<=0.05 MIU/L-ACNC: 0.64 UIU/ML (ref 0.27–4.2)
WBC MORPH BLD: NORMAL
WBC NRBC COR # BLD AUTO: 10.68 10*3/MM3 (ref 3.4–10.8)

## 2024-12-17 PROCEDURE — 71045 X-RAY EXAM CHEST 1 VIEW: CPT

## 2024-12-17 PROCEDURE — 85025 COMPLETE CBC W/AUTO DIFF WBC: CPT | Performed by: EMERGENCY MEDICINE

## 2024-12-17 PROCEDURE — 83735 ASSAY OF MAGNESIUM: CPT | Performed by: EMERGENCY MEDICINE

## 2024-12-17 PROCEDURE — 85007 BL SMEAR W/DIFF WBC COUNT: CPT | Performed by: EMERGENCY MEDICINE

## 2024-12-17 PROCEDURE — 83880 ASSAY OF NATRIURETIC PEPTIDE: CPT | Performed by: EMERGENCY MEDICINE

## 2024-12-17 PROCEDURE — 36415 COLL VENOUS BLD VENIPUNCTURE: CPT

## 2024-12-17 PROCEDURE — 93005 ELECTROCARDIOGRAM TRACING: CPT

## 2024-12-17 PROCEDURE — 99285 EMERGENCY DEPT VISIT HI MDM: CPT

## 2024-12-17 PROCEDURE — 80053 COMPREHEN METABOLIC PANEL: CPT | Performed by: EMERGENCY MEDICINE

## 2024-12-17 PROCEDURE — 25010000002 PROPOFOL 10 MG/ML EMULSION: Performed by: EMERGENCY MEDICINE

## 2024-12-17 PROCEDURE — 93005 ELECTROCARDIOGRAM TRACING: CPT | Performed by: EMERGENCY MEDICINE

## 2024-12-17 PROCEDURE — 84484 ASSAY OF TROPONIN QUANT: CPT | Performed by: EMERGENCY MEDICINE

## 2024-12-17 PROCEDURE — 84443 ASSAY THYROID STIM HORMONE: CPT | Performed by: EMERGENCY MEDICINE

## 2024-12-17 PROCEDURE — 92960 CARDIOVERSION ELECTRIC EXT: CPT

## 2024-12-17 RX ORDER — PROPOFOL 10 MG/ML
100 VIAL (ML) INTRAVENOUS ONCE
Status: COMPLETED | OUTPATIENT
Start: 2024-12-17 | End: 2024-12-17

## 2024-12-17 RX ADMIN — PROPOFOL 60 MG: 10 INJECTION, EMULSION INTRAVENOUS at 17:33

## 2024-12-17 NOTE — ED PROVIDER NOTES
Subjective   History of Present Illness    Pt presents with elevated HR.    Wife says he has history of AF and usually rate controlled in the 80s but last few days it has been 150s-160s.  Recently increased carvedilol 12.5 -> 25 mg BID, last week sometime.  BP has been running low at home since med change, as low as 64/50 sometimes.    Patient complains of a headache but otherwise feels pretty good.  Wife says he has shortness of breath as well.    He is scheduled for ablation on 1/21/25 with Dr Horvath.    He also has URI symptoms for a couple of weeks.  PCP put him on steroid and abx last week.    History provided by:  Patient and spouse      Review of Systems    Past Medical History:   Diagnosis Date    Acute systolic congestive heart failure     Acute systolic congestive heart failure secondary to atrial fibrillation with rapid ventricular response.    Alcohol abuse     Atrial fibrillation     COPD (chronic obstructive pulmonary disease)     Hearing loss     Hypertension     Syncope     Left and right heart catheterization, Dr. Larson, 03/15/2013, with normal pressures and normal coronary arteries: EF 55%.    Tobacco abuse        No Known Allergies    Past Surgical History:   Procedure Laterality Date    CARDIAC ELECTROPHYSIOLOGY PROCEDURE N/A 8/15/2023    Procedure: Ablation atrial fibrillation. Hold Eliquis morning of procedure. Schedule in 3 to 4 months;  Surgeon: Jeffrey Goff MD;  Location: [facility] INVASIVE LOCATION;  Service: Cardiovascular;  Laterality: N/A;    COLONOSCOPY      TEETH EXTRACTION      WRIST SURGERY Right     2015       Family History   Problem Relation Age of Onset    Heart disease Mother     Thyroid disease Mother     Hyperlipidemia Mother     Kidney disease Mother     Heart disease Maternal Uncle     Heart disease Paternal Uncle     Anemia Other         cousins       Social History     Socioeconomic History    Marital status:     Number of children: 2   Tobacco Use    Smoking  status: Former     Current packs/day: 0.00     Average packs/day: 1 pack/day for 42.0 years (42.0 ttl pk-yrs)     Types: Cigarettes     Start date:      Quit date: 2012     Years since quittin.9     Passive exposure: Past    Smokeless tobacco: Never   Vaping Use    Vaping status: Never Used   Substance and Sexual Activity    Alcohol use: Yes     Comment: 4 per day    Drug use: No    Sexual activity: Defer           Objective   Physical Exam  Vitals and nursing note reviewed.   Constitutional:       General: He is not in acute distress.     Appearance: Normal appearance. He is not ill-appearing.   HENT:      Head: Normocephalic and atraumatic.      Mouth/Throat:      Mouth: Mucous membranes are moist.   Eyes:      General: No scleral icterus.        Right eye: No discharge.         Left eye: No discharge.      Conjunctiva/sclera: Conjunctivae normal.   Cardiovascular:      Rate and Rhythm: Normal rate and regular rhythm. Tachycardia present. Rhythm irregular.      Heart sounds: No murmur heard.  Pulmonary:      Effort: Pulmonary effort is normal. No respiratory distress.      Breath sounds: Normal breath sounds. No wheezing.   Abdominal:      General: Bowel sounds are normal. There is no distension.      Palpations: Abdomen is soft.      Tenderness: There is no abdominal tenderness. There is no guarding or rebound.   Musculoskeletal:         General: No swelling. Normal range of motion.      Cervical back: Normal range of motion and neck supple.   Skin:     General: Skin is warm and dry.      Findings: No rash.   Neurological:      General: No focal deficit present.      Mental Status: He is alert and oriented to person, place, and time. Mental status is at baseline.   Psychiatric:         Mood and Affect: Mood normal.         Behavior: Behavior normal.         Thought Content: Thought content normal.         Electrical Cardioversion    Date/Time: 2024 7:49 PM    Performed by: Cosmo Hylton,  MD  Authorized by: Cosmo Hylton MD    Consent:     Consent obtained:  Verbal and written    Consent given by:  Patient    Risks discussed:  Induced arrhythmia    Alternatives discussed:  Rate-control medication and alternative treatment  Universal protocol:     Procedure explained and questions answered to patient or proxy's satisfaction: yes      Test results available: yes      Imaging studies available: yes      Patient identity confirmed:  Verbally with patient  Pre-procedure details:     Cardioversion basis:  Emergent    Rhythm:  Atrial fibrillation  Attempt one:     Cardioversion mode:  Synchronous    Waveform:  Biphasic    Shock (Joules):  120    Shock outcome:  Conversion to normal sinus rhythm  Post-procedure details:     Patient status:  Alert    Procedure completion:  Tolerated well, no immediate complications  Procedural Sedation    Date/Time: 12/17/2024 7:50 PM    Performed by: Cosmo Hylton MD  Authorized by: Cosmo Hylton MD    Consent:     Consent obtained:  Verbal and written    Consent given by:  Patient    Risks discussed:  Prolonged hypoxia resulting in organ damage, prolonged sedation necessitating reversal and respiratory compromise necessitating ventilatory assistance and intubation  Universal protocol:     Procedure explained and questions answered to patient or proxy's satisfaction: yes      Test results available: yes      Imaging studies available: yes      Patient identity confirmed:  Verbally with patient  Indications:     Procedure performed:  Cardioversion    Procedure necessitating sedation performed by:  Physician performing sedation    Intended level of sedation:  Moderate  Pre-sedation assessment:     NPO status caution: urgency dictates proceeding with non-ideal NPO status      ASA classification: class 3 - patient with severe systemic disease      Mallampati score:  I - soft palate, uvula, fauces, pillars visible    Neck mobility: normal      Pre-sedation  assessments completed and reviewed: airway patency, anesthesia/sedation history, cardiovascular function, mental status, nausea/vomiting, pain level and respiratory function      History of difficult intubation: no    Immediate pre-procedure details:     Reassessment: Patient reassessed immediately prior to procedure      Reviewed: vital signs      Verified: bag valve mask available, emergency equipment available, intubation equipment available, IV patency confirmed, oxygen available and suction available    Procedure details (see MAR for exact dosages):     Sedation start time:  12/17/2024 5:33 PM    Preoxygenation:  Nonrebreather mask    Sedation:  Propofol    Analgesia:  None    Intra-procedure monitoring:  Blood pressure monitoring, cardiac monitor, continuous capnometry, continuous pulse oximetry, frequent LOC assessments and frequent vital sign checks    Intra-procedure events: none      Sedation end time:  12/17/2024 5:51 PM    Total sedation time (minutes):  18  Post-procedure details:     Attendance: Constant attendance by certified staff until patient recovered      Recovery: Patient returned to pre-procedure baseline      Post-sedation assessments completed and reviewed: airway patency, cardiovascular function, hydration status, mental status, nausea/vomiting and respiratory function      Patient is stable for discharge or admission: no      Procedure completion:  Tolerated well, no immediate complications             ED Course    I reviewed outside records.  Cardiology note 8/29/24 notes persistent AF, previous amiodarone, had to stop due to COPD.  Also dilated cardiomyopathy, EF 48% 5/2022 echo.      EKG read by me AF RVR.    Labs benign.    Cardiology consulted for AF RVR with tenuous blood pressures and failure of other medical mgmt.  Seen in the ED by cardiology, they recommend cardioversion. Pt is agreeable.    See procedure note. Converted to NSR. He feels better.    Patient stable on serial  rechecks.  Discussed findings, concerns, plan of care, expected course, reasons to return and followup.  Provided the opportunity to ask questions.    Recommend decreasing carvedilol back to 12.5 mg BID.                                                   Medical Decision Making  Problems Addressed:  Atrial fibrillation with RVR: complicated acute illness or injury with systemic symptoms that poses a threat to life or bodily functions    Amount and/or Complexity of Data Reviewed  Independent Historian: spouse  External Data Reviewed: notes.  Labs: ordered. Decision-making details documented in ED Course.  Radiology: ordered and independent interpretation performed. Decision-making details documented in ED Course.  ECG/medicine tests: ordered and independent interpretation performed. Decision-making details documented in ED Course.  Discussion of management or test interpretation with external provider(s): cardiology    Risk  Prescription drug management.  Decision regarding hospitalization.        Final diagnoses:   Atrial fibrillation with RVR       ED Disposition  ED Disposition       ED Disposition   Discharge    Condition   Stable    Comment   --               Anali Weinberg, PA-C  [address] [address]  [phone]    In 1 day  As scheduled         Medication List      No changes were made to your prescriptions during this visit.            Cosmo Hylton MD  12/1955

## 2024-12-17 NOTE — CONSULTS
[facility]  ELECTROPHYSIOLOGY CARDIOLOGY CONSULTATION NOTE  Jason Lane  [id number]  1955   LOS: 0 days   Patient Care Team:  Anali Weinberg PA-C as PCP - General (Family Medicine)  Hernan Fernandez PA as Physician Assistant (Cardiology)  Jeffrey Goff MD as Consulting Physician (Cardiac Electrophysiology)  Ash Mancilla MD as Emergency Attending (Pulmonary Disease)    Reason for Consultation: atrial fibrillation with RVR    Problem List:   Persistent Atrial fibrillation:  Rapid ventricular response of unknown duration, hospitalized on 01/01/2012.   Pradaxa and sotalol initiated, 01/02/2012.   Echocardiogram, January 2012 with mild mitral regurgitation, ejection fraction 35% to 40%.   Echocardiogram, May 2012: Left ventricular ejection fraction of 45% to 50%, mild TR.   Echocardiogram, 12/15/2014: EF 50% to 55%. Mild-to-moderate TR.  Sotalol discontinued   AMY guided ECV with initiation of Amiodarone 5/13/2022  Amiodarone discontinued 5/2023   s/p EPS + PVA, RFA of left atrial roof of the left posterior wall 8/15/23  Dilated cardiomyopathy new onset 5/13/2022 diagnosed by AMY guided cardioversion  Started on amiodarone   AMY 5/13/2022: EF 48%  Alcohol abuse.   Tobacco abuse.   Hypertension.   Acute systolic congestive heart failure secondary to atrial fibrillation with rapid ventricular response.   Syncope:  Left and right heart catheterization, Dr. Larson, 03/15/2013, with normal pressures and normal coronary arteries: EF 55%     History of Present Illness:      Jason Lane is a 69 y.o. male past medical history listed above presents to [facility] today with complaints of elevated heart rate.  Over the last couple of weeks patient went into atrial fibrillation with high heart rates between 150 and 160s.  He increase his carvedilol without improvement in heart rate and blood pressure dropped.  He has an upcoming redo ablation in  January with Dr. Horvath.  He has had upper respiratory symptoms and was started on steroids and antibiotics.  His family has also experienced a death in the family which is caused emotional stress.  He is anticoagulated with Eliquis and denies any missed doses in the last 30 days.      No Known Allergies    Past Medical History:   Diagnosis Date    Acute systolic congestive heart failure     Acute systolic congestive heart failure secondary to atrial fibrillation with rapid ventricular response.    Alcohol abuse     Atrial fibrillation     COPD (chronic obstructive pulmonary disease)     Hearing loss     Hypertension     Syncope     Left and right heart catheterization, Dr. Larson, 03/15/2013, with normal pressures and normal coronary arteries: EF 55%.    Tobacco abuse       Past Surgical History:   Procedure Laterality Date    CARDIAC ELECTROPHYSIOLOGY PROCEDURE N/A 8/15/2023    Procedure: Ablation atrial fibrillation. Hold Eliquis morning of procedure. Schedule in 3 to 4 months;  Surgeon: Jeffrey Goff MD;  Location: [facility] LOC[facility];  Service: Cardiovascular;  Laterality: N/A;    COLONOSCOPY      TEETH EXTRACTION      WRIST SURGERY Right           Social History     Socioeconomic History    Marital status:     Number of children: 2   Tobacco Use    Smoking status: Former     Current packs/day: 0.00     Average packs/day: 1 pack/day for 42.0 years (42.0 ttl pk-yrs)     Types: Cigarettes     Start date:      Quit date: 2012     Years since quittin.9     Passive exposure: Past    Smokeless tobacco: Never   Vaping Use    Vaping status: Never Used   Substance and Sexual Activity    Alcohol use: Yes     Comment: 4 per day    Drug use: No    Sexual activity: Defer     Family History   Problem Relation Age of Onset    Heart disease Mother     Thyroid disease Mother     Hyperlipidemia Mother     Kidney disease Mother     Heart disease Maternal Uncle     Heart disease Paternal Uncle      "Anemia Other         cousins       (Not in a hospital admission)    Scheduled Meds:  Continuous Infusions:No current facility-administered medications for this encounter.    PRN Meds:.    Review of Systems  All systems have been reviewed and are negative with the exception of those mentioned in the HPI and problem list above.     Objective:       Physical Exam  /85 (BP Location: Left arm, Patient Position: Sitting)   Pulse 105   Temp 97.5 °F (36.4 °C) (Oral)   Resp 18   Ht 180.3 cm (71\")   Wt 91.6 kg (202 lb)   SpO2 99%   BMI 28.17 kg/m²       12/17/24  1413   Weight: 91.6 kg (202 lb)     Body mass index is 28.17 kg/m².  No intake or output data in the 24 hours ending 12/17/24 1541    Physical Exam  General Appearance:  Alert, cooperative, no distress, appears stated age   Neck: Supple, symmetrical, trachea midline, no carotid bruit or JVD   Lungs:   Clear to auscultation bilaterally, respirations unlabored   Heart:  irregular rate and tachy rhythm, S1, S2 normal, no murmur, rub or gallop   Extremities: No edema, normal range of motion   Pulses: 2+ and symmetric   Skin: Skin color, texture, turgor normal, no rashes or lesions   Neurologic: Normal     Cardiographics  EKG: Atrial fibrillation with RVR  ECHO: Results for orders placed during the hospital encounter of 02/01/24    Adult Transthoracic Echo Complete w/ Color, Spectral and Contrast if necessary per protocol    Interpretation Summary    Left ventricular systolic function is normal. Estimated left ventricular EF = 55%    Left ventricular diastolic function is consistent with (grade I) impaired relaxation.    The cardiac valves are anatomically and functionally normal.    Estimated right ventricular systolic pressure from tricuspid regurgitation is normal (<35 mmHg).    Compared to echocardiogram performed 8/16/2022, there has been no significant change.       Imaging  Chest x-ray: XR Chest 1 View    Result Date: 12/17/2024  Impression: No " evidence of acute cardiopulmonary disease. Electronically Signed: Nestor Acosta MD  12/17/2024 2:56 PM EST  Workstation ID: [id number]      Lab Review   Results from last 7 days   Lab Units 12/17/24  1426   SODIUM mmol/L 128*   POTASSIUM mmol/L 4.2   CHLORIDE mmol/L 97*   CO2 mmol/L 19.0*   BUN mg/dL 29*   CREATININE mg/dL 2.05*   GLUCOSE mg/dL 121*   CALCIUM mg/dL 9.4     Results from last 7 days   Lab Units 12/17/24  1426   WBC 10*3/mm3 10.68   HEMOGLOBIN g/dL 12.5*   HEMATOCRIT % 37.1*   PLATELETS 10*3/mm3 256             Results from last 7 days   Lab Units 12/17/24  1426   HSTROP T ng/L 18          * No active hospital problems. *        Assessment:     Atrial fibrillation RVR  [id number] 3 (age, CHF, HTN)  PVA 8/2023  Previously failed sotalol and amiodarone stopped due to long-term side effects.  Upper respiratory infection in the last week  Patient has been on steroids and antibiotics.  Dilated cardiomyopathy  Echo, 2/2024: EF 55%, grade 1 impaired relaxation     Plan:     Discussed options with patient as far as starting amiodarone and cardioversion vs other antiarrhythmics.  He would like to avoid amiodarone at this time due to stage IV COPD as well as a history of thyroid problems and heavy alcohol use.  Patient drinks 5 beers per night.  Limited by rate controlling medications due to hypotension.   Patient discussed with his family and he would like to proceed with a cardioversion at this time without medication.  He understands that he may return to atrial fibrillation.  Patient has been anticoagulated with Eliquis for twice daily for the last 30 days without any missed doses.  He knows the importance of continue this medication for the next 30 days uninterrupted.  Procedure, risks, and alternatives have been discussed with the patient and he is agreeable to proceed. Discussed with Dr. Hylton as well.   He will stop the steroids.   Redo Ablation scheduled for January.       Electronically signed by  Payton Grider, APRN, 12/17/24, 3:41 PM EST.     Please note that portions of this note were dictated utilizing Dragon dictation.

## 2024-12-18 ENCOUNTER — TELEPHONE (OUTPATIENT)
Dept: FAMILY MEDICINE CLINIC | Facility: CLINIC | Age: 69
End: 2024-12-18

## 2024-12-18 ENCOUNTER — OFFICE VISIT (OUTPATIENT)
Dept: FAMILY MEDICINE CLINIC | Facility: CLINIC | Age: 69
End: 2024-12-18
Payer: MEDICARE

## 2024-12-18 VITALS
OXYGEN SATURATION: 100 % | HEART RATE: 74 BPM | SYSTOLIC BLOOD PRESSURE: 138 MMHG | BODY MASS INDEX: 27.69 KG/M2 | WEIGHT: 197.8 LBS | HEIGHT: 71 IN | TEMPERATURE: 97.8 F | DIASTOLIC BLOOD PRESSURE: 80 MMHG

## 2024-12-18 DIAGNOSIS — H40.1134 PRIMARY OPEN ANGLE GLAUCOMA (POAG) OF BOTH EYES, INDETERMINATE STAGE: ICD-10-CM

## 2024-12-18 DIAGNOSIS — Z00.00 GENERAL MEDICAL EXAM: ICD-10-CM

## 2024-12-18 DIAGNOSIS — Z00.00 MEDICARE ANNUAL WELLNESS VISIT, SUBSEQUENT: Primary | ICD-10-CM

## 2024-12-18 DIAGNOSIS — Z79.899 LONG TERM CURRENT USE OF ANTIARRHYTHMIC DRUG: ICD-10-CM

## 2024-12-18 LAB
QT INTERVAL: 270 MS
QT INTERVAL: 376 MS
QTC INTERVAL: 414 MS
QTC INTERVAL: 420 MS

## 2024-12-18 PROCEDURE — 3075F SYST BP GE 130 - 139MM HG: CPT | Performed by: PHYSICIAN ASSISTANT

## 2024-12-18 PROCEDURE — 1160F RVW MEDS BY RX/DR IN RCRD: CPT | Performed by: PHYSICIAN ASSISTANT

## 2024-12-18 PROCEDURE — 1170F FXNL STATUS ASSESSED: CPT | Performed by: PHYSICIAN ASSISTANT

## 2024-12-18 PROCEDURE — 1125F AMNT PAIN NOTED PAIN PRSNT: CPT | Performed by: PHYSICIAN ASSISTANT

## 2024-12-18 PROCEDURE — G0439 PPPS, SUBSEQ VISIT: HCPCS | Performed by: PHYSICIAN ASSISTANT

## 2024-12-18 PROCEDURE — 1159F MED LIST DOCD IN RCRD: CPT | Performed by: PHYSICIAN ASSISTANT

## 2024-12-18 PROCEDURE — 3079F DIAST BP 80-89 MM HG: CPT | Performed by: PHYSICIAN ASSISTANT

## 2024-12-18 PROCEDURE — 99397 PER PM REEVAL EST PAT 65+ YR: CPT | Performed by: PHYSICIAN ASSISTANT

## 2024-12-18 NOTE — TELEPHONE ENCOUNTER
I had originally sent the prescription in for a quantity of 90 for a 90-day supply so I sent in a new prescription for just a 30-day supply already 30 that way that should be a third of the price see if that works out for him.

## 2024-12-18 NOTE — PROGRESS NOTES
Subjective   Jason Lane is a 69 y.o. male  Medicare Wellness-subsequent (Medicare wellness) and Annual Exam      History of Present Illness  History of Present Illness    Patient presents today for a preventive medical visit.  Patient is here to determine screening labs and tests that are due and to determine immunization status as well.  Patient will be counseled regarding preventative medicine issues such as regular exercise and healthy diet as well.  The following portions of the patient's history were reviewed and updated as appropriate: allergies, current medications, past social history and problem list    Review of Systems   Constitutional: Negative.    HENT: Negative.     Eyes: Negative.    Respiratory: Negative.     Cardiovascular: Negative.    Gastrointestinal: Negative.  Positive for constipation.   Endocrine: Negative.    Genitourinary: Negative.    Musculoskeletal: Negative.    Skin: Negative.    Allergic/Immunologic: Negative.    Neurological: Negative.    Hematological: Negative.    Psychiatric/Behavioral: Negative.     All other systems reviewed and are negative.      Objective     Vitals:    12/18/24 1055   BP: 138/80   Pulse: 74   Temp: 97.8 °F (36.6 °C)   SpO2: 100%       Physical Exam  Vitals and nursing note reviewed.   Constitutional:       General: He is not in acute distress.     Appearance: Normal appearance. He is well-developed. He is not ill-appearing, toxic-appearing or diaphoretic.   HENT:      Head: Normocephalic and atraumatic.      Right Ear: External ear normal.      Left Ear: External ear normal.   Eyes:      Conjunctiva/sclera: Conjunctivae normal.      Pupils: Pupils are equal, round, and reactive to light.   Neck:      Thyroid: No thyromegaly.      Vascular: No carotid bruit.   Cardiovascular:      Rate and Rhythm: Normal rate and regular rhythm.      Pulses: Normal pulses.      Heart sounds: Normal heart sounds. No murmur heard.  Pulmonary:      Effort: Pulmonary effort  is normal. No respiratory distress.      Breath sounds: Normal breath sounds.   Abdominal:      General: Bowel sounds are normal.      Palpations: Abdomen is soft. There is no mass.      Tenderness: There is no abdominal tenderness.   Musculoskeletal:         General: No swelling. Normal range of motion.      Cervical back: Normal range of motion and neck supple.   Lymphadenopathy:      Cervical: No cervical adenopathy.   Skin:     General: Skin is warm and dry.      Findings: No lesion or rash.   Neurological:      Mental Status: He is alert and oriented to person, place, and time.      Cranial Nerves: No cranial nerve deficit.      Sensory: No sensory deficit.      Motor: No weakness.      Coordination: Coordination normal.      Gait: Gait normal.      Deep Tendon Reflexes: Reflexes are normal and symmetric.   Psychiatric:         Mood and Affect: Mood normal.         Behavior: Behavior normal.         Thought Content: Thought content normal.         Judgment: Judgment normal.       Physical Exam  Discussed preventative medicine issues with patient including regular exercise, healthy diet, stress reduction, adequate sleep and recommended age-appropriate screening studies.    Assessment & Plan   Assessment & Plan      Diagnoses and all orders for this visit:    1. Medicare annual wellness visit, subsequent (Primary)    2. General medical exam    3. Primary open angle glaucoma (POAG) of both eyes, indeterminate stage    4. Long term current use of antiarrhythmic drug    Other orders  -     linaclotide (LINZESS) 145 MCG capsule capsule; Take 1 capsule by mouth Daily. Take daily for chronic constipation  Dispense: 90 capsule; Refill: 3         Patient or patient representative verbalized consent for the use of Ambient Listening during the visit with  Anali Weinberg PA-C for chart documentation. 12/18/2024  12:47 EST

## 2024-12-18 NOTE — PROGRESS NOTES
The ABCs of the Annual Wellness Visit  Subsequent Medicare Wellness Visit    Chief Complaint   Patient presents with    Medicare Wellness-subsequent     Medicare wellness    Annual Exam      Subjective   History of Present Illness:  Jason Lane is a 69 y.o. male who presents for a Subsequent Medicare Wellness Visit.  History of Present Illness  The patient is a 69-year-old male presenting today for an annual physical and Medicare wellness visit.    He was in the emergency department yesterday due to atrial fibrillation with a rapid ventricular rate, necessitating cardioversion. He experienced episodes of dizziness and lightheadedness, accompanied by hypotension (60/50) and weakness, but did not lose consciousness. His condition has improved since yesterday. He has been in contact with his cardiologist and has not had any recent falls. A cardioversion procedure was initially scheduled for April 2024 but was expedited to Sunday. He is scheduled for a cardiac ablation on 01/21/2025 with his cardiologist. His medication regimen includes a blood thinner and carvedilol, which was recently adjusted to manage his heart rate without causing excessive blood pressure reduction.    He has glaucoma and uses eyedrops to keep the pressure down. He gets his eye exams every 6 months to a year.    His breathing has been decent as far as his COPD. He sees pulmonology every 6 to 12 months. He uses Trelegy and albuterol as needed.    He reports some issues with bowel movements, which he manages by drinking coffee. He experiences periods of up to 5 days without bowel movements, leading to bloating and discomfort.    MEDICATIONS  Current: carvedilol, Trelegy, albuterol    The following portions of the patient's history were reviewed and   updated as appropriate: allergies, past family history, past medical history, past social history, past surgical history, and problem list.    Compared to one year ago, the patient feels his  physical   health is the same.    Compared to one year ago, the patient feels his mental   health is the same.    Recent Hospitalizations:  He was admitted within the past 365 days at [facility].       Current Medical Providers:  Patient Care Team:  Anali Weinberg PA-C as PCP - General (Family Medicine)  Hernan Fernandez PA as Physician Assistant (Cardiology)  Jeffrey Goff MD as Consulting Physician (Cardiac Electrophysiology)  Ash Mancilla MD as Emergency Attending (Pulmonary Disease)    Outpatient Medications Prior to Visit   Medication Sig Dispense Refill    albuterol sulfate  (90 Base) MCG/ACT inhaler Inhale 2 puffs Every 4 (Four) Hours As Needed for Wheezing.      brimonidine (ALPHAGAN) 0.2 % ophthalmic solution Administer 1 drop to both eyes 2 (Two) Times a Day.      carvedilol (COREG) 25 MG tablet Take 0.5 tablets by mouth 2 (Two) Times a Day. 60 tablet 6    carvedilol (COREG) 25 MG tablet Take 1 tablet by mouth 2 (Two) Times a Day. 60 tablet 6    dorzolamide (TRUSOPT) 2 % ophthalmic solution Administer 1 drop to both eyes 2 (Two) Times a Day.      doxazosin (CARDURA) 2 MG tablet Take 1/2 tablet by mouth Every Night. For BP and prostate 15 tablet 6    Eliquis 5 MG tablet tablet Take 1 tablet by mouth Every 12 (Twelve) Hours. 60 tablet 6    Fluticasone-Umeclidin-Vilant (Trelegy Ellipta) 100-62.5-25 MCG/ACT inhaler Inhale 1 puff Daily. 1 each 11    ketoconazole (NIZORAL) 2 % shampoo Apply  topically to the appropriate area as directed 1 (One) Time Per Week.      latanoprost (XALATAN) 0.005 % ophthalmic solution Administer 1 drop to both eyes Every Night.      lisinopril (PRINIVIL,ZESTRIL) 20 MG tablet Take 1 tablet by mouth Every 12 (Twelve) Hours.      promethazine (PHENERGAN) 25 MG tablet Take 1 tablet by mouth Every 6 (Six) Hours As Needed for Nausea or Vomiting. 20 tablet 1    timolol (TIMOPTIC) 0.5 % ophthalmic solution Administer 1 drop to both eyes Daily.       "doxycycline (MONODOX) 100 MG capsule Take 1 capsule by mouth 2 (Two) Times a Day. 20 capsule 0    predniSONE (DELTASONE) 10 MG tablet Take 1 tablet by mouth Daily. 14 tablet 0    promethazine-dextromethorphan (PROMETHAZINE-DM) 6.25-15 MG/5ML syrup Take 5 mL by mouth 4 (Four) Times a Day As Needed for Cough. (Patient not taking: Reported on 12/11/2024) 180 mL 1     No facility-administered medications prior to visit.       No opioid medication identified on active medication list. I have reviewed chart for other potential  high risk medication/s and harmful drug interactions in the elderly.        Aspirin is not on active medication list.  Aspirin use is contraindicated for this patient due to: current use of Eliquis.  .    Patient Active Problem List   Diagnosis    Anemia    Atrial fibrillation    Benign prostatic hyperplasia    Reduced libido    Hypertension    Seasonal allergic rhinitis    Eczema    Alcohol abuse    Chronic low back pain without sciatica    Cardiomyopathy, dilated    Long term current use of antiarrhythmic drug    Chronic anticoagulation    Primary open angle glaucoma (POAG) of both eyes, indeterminate stage    Stage IV (Very severe) COPD    Former smoker (Stopped 2012)    Pulmonary nodule (LLL pleural based)    JONES (dyspnea on exertion)     Advance Care Planning  ACP discussion was declined by the patient.      Review of Systems      Objective      Vitals:    12/18/24 1055   BP: 138/80   Pulse: 74   Temp: 97.8 °F (36.6 °C)   SpO2: 100%   Weight: 89.7 kg (197 lb 12.8 oz)   Height: 180.3 cm (71\")   PainSc:   2     BMI Readings from Last 1 Encounters:   12/18/24 27.59 kg/m²   BMI is within normal parameters. No follow-up required.  BMI Readings from Last 1 Encounters:   12/18/24 27.59 kg/m²   BMI is above normal parameters. Recommendations include: Information on healthy weight added to patient's after visit summary    Does the patient have evidence of cognitive impairment? No    Physical " Exam  Physical Exam  Lungs were auscultated.  Heart rhythm is normal.    Vital Signs  Blood pressure is normal.       Results             HEALTH RISK ASSESSMENT    Smoking Status:  Social History     Tobacco Use   Smoking Status Former    Current packs/day: 0.00    Average packs/day: 1 pack/day for 42.0 years (42.0 ttl pk-yrs)    Types: Cigarettes    Start date:     Quit date: 2012    Years since quittin.9    Passive exposure: Past   Smokeless Tobacco Never     Alcohol Consumption:  Social History     Substance and Sexual Activity   Alcohol Use Yes    Comment: 4 per day     Fall Risk Screen:    STEADI Fall Risk Assessment was completed, and patient is at LOW risk for falls.Assessment completed on:2024    Depression Screenin/18/2024    10:54 AM   PHQ-2/PHQ-9 Depression Screening   Little interest or pleasure in doing things Several days   Feeling down, depressed, or hopeless Several days   Trouble falling or staying asleep, or sleeping too much Several days   Feeling tired or having little energy Not at all   Poor appetite or overeating Not at all   Feeling bad about yourself - or that you are a failure or have let yourself or your family down Not at all   Trouble concentrating on things, such as reading the newspaper or watching television Not at all   Moving or speaking so slowly that other people could have noticed? Or the opposite - being so fidgety or restless that you have been moving around a lot more than usual. Not at all   Thoughts that you would be better off dead or hurting yourself in some way Not at all   Patient Health Questionnaire-9 Score 3   How difficult have these problems made it for you to do your work, take care of things at home, or get along with other people? Somewhat difficult       Health Habits and Functional and Cognitive Screenin/18/2024    10:53 AM   Functional & Cognitive Status   Do you have difficulty preparing food and eating? No   Do you have  difficulty bathing yourself, getting dressed or grooming yourself? No   Do you have difficulty using the toilet? No   Do you have difficulty moving around from place to place? No   Do you have trouble with steps or getting out of a bed or a chair? No   Current Diet Well Balanced Diet   Dental Exam Up to date   Eye Exam Up to date   Exercise (times per week) 0 times per week   Current Exercises Include No Regular Exercise   Do you need help using the phone?  No   Are you deaf or do you have serious difficulty hearing?  No   Do you need help to go to places out of walking distance? No   Do you need help shopping? No   Do you need help preparing meals?  No   Do you need help with housework?  No   Do you need help with laundry? No   Do you need help taking your medications? No   Do you need help managing money? No   Do you ever drive or ride in a car without wearing a seat belt? No   Have you felt unusual stress, anger or loneliness in the last month? No   Who do you live with? Spouse   If you need help, do you have trouble finding someone available to you? No   Have you been bothered in the last four weeks by sexual problems? No   Do you have difficulty concentrating, remembering or making decisions? No       Age-appropriate Screening Schedule:  Refer to the list below for future screening recommendations based on patient's age, sex and/or medical conditions. Orders for these recommended tests are listed in the plan section. The patient has been provided with a written plan.    Health Maintenance   Topic Date Due    AAA SCREEN (ONE-TIME)  12/18/2024 (Originally 11/22/2022)    COVID-19 Vaccine (3 - 2024-25 season) 03/09/2025 (Originally 9/1/2024)    TDAP/TD VACCINES (1 - Tdap) 12/08/2027 (Originally 11/8/1974)    LUNG CANCER SCREENING  11/01/2025    ANNUAL WELLNESS VISIT  12/18/2025    BMI FOLLOWUP  12/18/2025    COLORECTAL CANCER SCREENING  03/01/2026    HEPATITIS C SCREENING  Completed    INFLUENZA VACCINE   Discontinued    Pneumococcal Vaccine 65+  Discontinued    ZOSTER VACCINE  Discontinued              Assessment & Plan     CMS Preventative Services Quick Reference  Risk Factors Identified During Encounter  Fall Risk-High or Moderate: Discussed Fall Prevention in the home  The above risks/problems have been discussed with the patient.  Follow up actions/plans if indicated are seen below in the Assessment/Plan Section.  Pertinent information has been shared with the patient in the After Visit Summary.    Diagnoses and all orders for this visit:    1. Medicare annual wellness visit, subsequent (Primary)    2. General medical exam    3. Primary open angle glaucoma (POAG) of both eyes, indeterminate stage    4. Long term current use of antiarrhythmic drug    Other orders  -     linaclotide (LINZESS) 145 MCG capsule capsule; Take 1 capsule by mouth Daily. Take daily for chronic constipation  Dispense: 90 capsule; Refill: 3      Assessment & Plan  1. Annual physical and Medicare wellness visit.  His blood pressure readings are within the normal range today. He is under the care of a cardiologist and has recently undergone comprehensive blood work.    2. Atrial fibrillation with rapid ventricular rate.  He was in the emergency department yesterday and underwent cardioversion. He is scheduled for a cardiac ablation on 01/21/2025 with his cardiologist. He is advised to continue his current medication regimen, including carvedilol and blood thinners, as directed by his cardiologist.    3. Chronic obstructive pulmonary disease (COPD).  He is currently using Trelegy and albuterol as needed. He follows up with his pulmonologist every 6 to 12 months.    4. Glaucoma.  He continues to use eyedrops to manage his intraocular pressure, which has been stable. He follows up with his eye doctor every 6 months to a year.    5. Constipation.  He is advised to take it daily initially to establish regularity, then adjust the frequency as  needed. If the medication is not covered by his insurance, an alternative brand will be prescribed. A prescription for a medication to manage chronic constipation has been provided, with a year's worth of refills.    Follow-up  The patient will follow up in 6 months.    PROCEDURE  The patient underwent cardioversion in the emergency department yesterday.    Follow Up:  No follow-ups on file.     An After Visit Summary and PPPS were given to the patient.           Patient or patient representative verbalized consent for the use of Ambient Listening during the visit with  Anali Weinberg PA-C for chart documentation. 12/18/2024  11:04 EST

## 2024-12-18 NOTE — TELEPHONE ENCOUNTER
When I entered the prescription the chart said this was preferred on his insurance.  Is there any way you can check with the pharmacist to see what alternative medications are less expensive on his insurance plan?

## 2024-12-18 NOTE — TELEPHONE ENCOUNTER
Caller: Arnulfo Lane    Relationship: Emergency Contact    Best call back number: [phone]     Which medication are you concerned about: linaclotide (LINZESS) 145 MCG capsule capsule     Who prescribed you this medication: SRINATH MORENO PA-C     What are your concerns: PATIENT'S (WIFE) ARNULFO STATED THAT PATIENT'S MEDICATION IS $400 OUT OF POCKET AND PATIENT WOULD LIKE FOR A ALTERNATIVE MEDICATION TO BE CALLED INTO THE PHARMACY       [facility] - [place] - [address] [place] [address] - [phone] [facility] [phone]  [phone]

## 2024-12-19 RX ORDER — LACTULOSE 10 G/15ML
20 SOLUTION ORAL NIGHTLY
Qty: 473 ML | Refills: 1 | Status: SHIPPED | OUTPATIENT
Start: 2024-12-19

## 2024-12-19 NOTE — TELEPHONE ENCOUNTER
Spoke to Mart, he originally ran the prescription for 30 days instead of 90 days. This prescription will be 133.00. would you like to change this to something else?

## 2024-12-19 NOTE — TELEPHONE ENCOUNTER
Caller: Arnulfo Lane    Relationship: Emergency Contact    Best call back number: [phone]    What is the best time to reach you: ANY TIME    Who are you requesting to speak with (clinical staff, provider,  specific staff member): SRINATH MORENO    Do you know the name of the person who called: ARNULFO    What was the call regarding: SPOUSE CALLED PHARMACY AND THE 30 DAY SUPPLY IS $132 WHICH HE STILL CAN NOT AFFORD. IS THERE ANOTHER MEDICATION THAT WOULD BE CHEAPER UNTIL THE BEGINNING OF NEXT YEAR WHEN HE WILL NOT BE IN THE [place]. PLEASE ADVISE

## 2024-12-20 ENCOUNTER — PREP FOR SURGERY (OUTPATIENT)
Dept: OTHER | Facility: HOSPITAL | Age: 69
End: 2024-12-20
Payer: MEDICARE

## 2024-12-20 DIAGNOSIS — I48.91 ATRIAL FIBRILLATION WITH RVR: Primary | ICD-10-CM

## 2024-12-20 RX ORDER — NITROGLYCERIN 0.4 MG/1
0.4 TABLET SUBLINGUAL
OUTPATIENT
Start: 2024-12-20

## 2024-12-20 RX ORDER — ACETAMINOPHEN 325 MG/1
650 TABLET ORAL EVERY 4 HOURS PRN
OUTPATIENT
Start: 2024-12-20

## 2024-12-20 RX ORDER — SODIUM CHLORIDE 0.9 % (FLUSH) 0.9 %
10 SYRINGE (ML) INJECTION AS NEEDED
OUTPATIENT
Start: 2024-12-20

## 2024-12-20 RX ORDER — SODIUM CHLORIDE 0.9 % (FLUSH) 0.9 %
10 SYRINGE (ML) INJECTION EVERY 12 HOURS SCHEDULED
OUTPATIENT
Start: 2024-12-20

## 2024-12-20 RX ORDER — SODIUM CHLORIDE 9 MG/ML
40 INJECTION, SOLUTION INTRAVENOUS AS NEEDED
OUTPATIENT
Start: 2024-12-20

## 2024-12-20 RX ORDER — ONDANSETRON 2 MG/ML
4 INJECTION INTRAMUSCULAR; INTRAVENOUS EVERY 6 HOURS PRN
OUTPATIENT
Start: 2024-12-20

## 2025-01-15 RX ORDER — LISINOPRIL 20 MG/1
20 TABLET ORAL 2 TIMES DAILY
Qty: 60 TABLET | Refills: 3 | Status: SHIPPED | OUTPATIENT
Start: 2025-01-15

## 2025-01-16 ENCOUNTER — HOSPITAL ENCOUNTER (OUTPATIENT)
Dept: CT IMAGING | Facility: HOSPITAL | Age: 70
Discharge: HOME | End: 2025-01-16
Payer: MEDICARE

## 2025-01-16 ENCOUNTER — PRE-ADMISSION TESTING (OUTPATIENT)
Dept: PREADMISSION TESTING | Facility: HOSPITAL | Age: 70
End: 2025-01-16
Payer: MEDICARE

## 2025-01-16 DIAGNOSIS — I48.91 ATRIAL FIBRILLATION WITH RVR: ICD-10-CM

## 2025-01-16 DIAGNOSIS — I48.19 PERSISTENT ATRIAL FIBRILLATION: ICD-10-CM

## 2025-01-16 LAB
ANION GAP SERPL CALCULATED.3IONS-SCNC: 11 MMOL/L (ref 5–15)
BUN SERPL-MCNC: 12 MG/DL (ref 8–23)
BUN/CREAT SERPL: 12 (ref 7–25)
CALCIUM SPEC-SCNC: 8.9 MG/DL (ref 8.6–10.5)
CHLORIDE SERPL-SCNC: 98 MMOL/L (ref 98–107)
CO2 SERPL-SCNC: 25 MMOL/L (ref 22–29)
CREAT SERPL-MCNC: 1 MG/DL (ref 0.76–1.27)
DEPRECATED RDW RBC AUTO: 46.8 FL (ref 37–54)
EGFRCR SERPLBLD CKD-EPI 2021: 82.5 ML/MIN/1.73
ERYTHROCYTE [DISTWIDTH] IN BLOOD BY AUTOMATED COUNT: 13.1 % (ref 12.3–15.4)
GLUCOSE SERPL-MCNC: 97 MG/DL (ref 65–99)
HCT VFR BLD AUTO: 41.4 % (ref 37.5–51)
HGB BLD-MCNC: 13.7 G/DL (ref 13–17.7)
MCH RBC QN AUTO: 31.9 PG (ref 26.6–33)
MCHC RBC AUTO-ENTMCNC: 33.1 G/DL (ref 31.5–35.7)
MCV RBC AUTO: 96.3 FL (ref 79–97)
PLATELET # BLD AUTO: 240 10*3/MM3 (ref 140–450)
PMV BLD AUTO: 9.3 FL (ref 6–12)
POTASSIUM SERPL-SCNC: 5 MMOL/L (ref 3.5–5.2)
RBC # BLD AUTO: 4.3 10*6/MM3 (ref 4.14–5.8)
SODIUM SERPL-SCNC: 134 MMOL/L (ref 136–145)
WBC NRBC COR # BLD AUTO: 17.24 10*3/MM3 (ref 3.4–10.8)

## 2025-01-16 PROCEDURE — 71275 CT ANGIOGRAPHY CHEST: CPT

## 2025-01-16 PROCEDURE — 36415 COLL VENOUS BLD VENIPUNCTURE: CPT

## 2025-01-16 PROCEDURE — 25510000001 IOPAMIDOL PER 1 ML: Performed by: INTERNAL MEDICINE

## 2025-01-16 PROCEDURE — 85027 COMPLETE CBC AUTOMATED: CPT

## 2025-01-16 PROCEDURE — 80048 BASIC METABOLIC PNL TOTAL CA: CPT

## 2025-01-16 RX ORDER — HYDROCORTISONE 25 MG/ML
1 LOTION TOPICAL
COMMUNITY
Start: 2025-01-14

## 2025-01-16 RX ORDER — IOPAMIDOL 755 MG/ML
100 INJECTION, SOLUTION INTRAVASCULAR
Status: COMPLETED | OUTPATIENT
Start: 2025-01-16 | End: 2025-01-16

## 2025-01-16 RX ADMIN — IOPAMIDOL 85 ML: 755 INJECTION, SOLUTION INTRAVENOUS at 13:45

## 2025-01-16 NOTE — PROGRESS NOTES
Called patient to ask if he had been sick recently due to his white blood cells being elevated on PAT testing.  He states he has had a cold/sinus issues.  He is currently in the process of getting better.  Denies any fever.  SUSANA Duke

## 2025-01-21 ENCOUNTER — ANESTHESIA EVENT (OUTPATIENT)
Dept: CARDIOLOGY | Facility: HOSPITAL | Age: 70
End: 2025-01-21
Payer: MEDICARE

## 2025-01-21 ENCOUNTER — ANESTHESIA (OUTPATIENT)
Dept: CARDIOLOGY | Facility: HOSPITAL | Age: 70
End: 2025-01-21
Payer: MEDICARE

## 2025-01-21 ENCOUNTER — HOSPITAL ENCOUNTER (OUTPATIENT)
Facility: HOSPITAL | Age: 70
Setting detail: HOSPITAL OUTPATIENT SURGERY
Discharge: HOME | End: 2025-01-21
Attending: INTERNAL MEDICINE | Admitting: INTERNAL MEDICINE
Payer: MEDICARE

## 2025-01-21 VITALS
WEIGHT: 198 LBS | OXYGEN SATURATION: 95 % | DIASTOLIC BLOOD PRESSURE: 90 MMHG | HEART RATE: 74 BPM | SYSTOLIC BLOOD PRESSURE: 177 MMHG | RESPIRATION RATE: 12 BRPM | BODY MASS INDEX: 27.72 KG/M2 | HEIGHT: 71 IN | TEMPERATURE: 97.1 F

## 2025-01-21 DIAGNOSIS — I48.91 ATRIAL FIBRILLATION WITH RVR: ICD-10-CM

## 2025-01-21 DIAGNOSIS — I48.19 PERSISTENT ATRIAL FIBRILLATION: ICD-10-CM

## 2025-01-21 LAB
ACT BLD: 331 SECONDS (ref 82–152)
ACT BLD: 337 SECONDS (ref 82–152)
ACT BLD: 389 SECONDS (ref 82–152)
ANION GAP SERPL CALCULATED.3IONS-SCNC: 8 MMOL/L (ref 5–15)
BUN SERPL-MCNC: 9 MG/DL (ref 8–23)
BUN/CREAT SERPL: 9.5 (ref 7–25)
CALCIUM SPEC-SCNC: 8.7 MG/DL (ref 8.6–10.5)
CHLORIDE SERPL-SCNC: 100 MMOL/L (ref 98–107)
CO2 SERPL-SCNC: 26 MMOL/L (ref 22–29)
CREAT SERPL-MCNC: 0.95 MG/DL (ref 0.76–1.27)
DEPRECATED RDW RBC AUTO: 43.8 FL (ref 37–54)
EGFRCR SERPLBLD CKD-EPI 2021: 87.7 ML/MIN/1.73
ERYTHROCYTE [DISTWIDTH] IN BLOOD BY AUTOMATED COUNT: 12.7 % (ref 12.3–15.4)
GLUCOSE SERPL-MCNC: 94 MG/DL (ref 65–99)
HCT VFR BLD AUTO: 38.1 % (ref 37.5–51)
HGB BLD-MCNC: 12.7 G/DL (ref 13–17.7)
MCH RBC QN AUTO: 31.4 PG (ref 26.6–33)
MCHC RBC AUTO-ENTMCNC: 33.3 G/DL (ref 31.5–35.7)
MCV RBC AUTO: 94.3 FL (ref 79–97)
PLATELET # BLD AUTO: 268 10*3/MM3 (ref 140–450)
PMV BLD AUTO: 9.1 FL (ref 6–12)
POTASSIUM SERPL-SCNC: 4.5 MMOL/L (ref 3.5–5.2)
QT INTERVAL: 428 MS
QTC INTERVAL: 455 MS
RBC # BLD AUTO: 4.04 10*6/MM3 (ref 4.14–5.8)
SODIUM SERPL-SCNC: 134 MMOL/L (ref 136–145)
WBC NRBC COR # BLD AUTO: 12.86 10*3/MM3 (ref 3.4–10.8)

## 2025-01-21 PROCEDURE — C1760 CLOSURE DEV, VASC: HCPCS | Performed by: INTERNAL MEDICINE

## 2025-01-21 PROCEDURE — 93656 COMPRE EP EVAL ABLTJ ATR FIB: CPT | Performed by: INTERNAL MEDICINE

## 2025-01-21 PROCEDURE — 25810000003 SODIUM CHLORIDE 0.9 % SOLUTION 250 ML FLEX CONT

## 2025-01-21 PROCEDURE — 25010000002 LIDOCAINE 1 % SOLUTION

## 2025-01-21 PROCEDURE — C1766 INTRO/SHEATH,STRBLE,NON-PEEL: HCPCS | Performed by: INTERNAL MEDICINE

## 2025-01-21 PROCEDURE — 25010000002 PHENYLEPHRINE 10 MG/ML SOLUTION 1 ML VIAL

## 2025-01-21 PROCEDURE — C1759 CATH, INTRA ECHOCARDIOGRAPHY: HCPCS | Performed by: INTERNAL MEDICINE

## 2025-01-21 PROCEDURE — 93657 TX L/R ATRIAL FIB ADDL: CPT | Performed by: INTERNAL MEDICINE

## 2025-01-21 PROCEDURE — 25010000002 DEXAMETHASONE PER 1 MG

## 2025-01-21 PROCEDURE — C1894 INTRO/SHEATH, NON-LASER: HCPCS | Performed by: INTERNAL MEDICINE

## 2025-01-21 PROCEDURE — C1733 CATH, EP, OTHR THAN COOL-TIP: HCPCS | Performed by: INTERNAL MEDICINE

## 2025-01-21 PROCEDURE — 25810000003 SODIUM CHLORIDE 0.9 % SOLUTION

## 2025-01-21 PROCEDURE — 93005 ELECTROCARDIOGRAM TRACING: CPT | Performed by: INTERNAL MEDICINE

## 2025-01-21 PROCEDURE — 25010000002 HEPARIN (PORCINE) PER 1000 UNITS: Performed by: INTERNAL MEDICINE

## 2025-01-21 PROCEDURE — 85027 COMPLETE CBC AUTOMATED: CPT | Performed by: INTERNAL MEDICINE

## 2025-01-21 PROCEDURE — 25010000002 SUGAMMADEX 200 MG/2ML SOLUTION

## 2025-01-21 PROCEDURE — 25010000002 ONDANSETRON PER 1 MG

## 2025-01-21 PROCEDURE — C1732 CATH, EP, DIAG/ABL, 3D/VECT: HCPCS | Performed by: INTERNAL MEDICINE

## 2025-01-21 PROCEDURE — 25010000002 PROPOFOL 10 MG/ML EMULSION

## 2025-01-21 PROCEDURE — 85347 COAGULATION TIME ACTIVATED: CPT

## 2025-01-21 PROCEDURE — C1730 CATH, EP, 19 OR FEW ELECT: HCPCS | Performed by: INTERNAL MEDICINE

## 2025-01-21 PROCEDURE — 80048 BASIC METABOLIC PNL TOTAL CA: CPT | Performed by: INTERNAL MEDICINE

## 2025-01-21 PROCEDURE — 25010000002 LIDOCAINE PF 1% 1 % SOLUTION: Performed by: INTERNAL MEDICINE

## 2025-01-21 PROCEDURE — 25010000002 PROTAMINE SULFATE PER 10 MG: Performed by: INTERNAL MEDICINE

## 2025-01-21 RX ORDER — ONDANSETRON 2 MG/ML
INJECTION INTRAMUSCULAR; INTRAVENOUS AS NEEDED
Status: DISCONTINUED | OUTPATIENT
Start: 2025-01-21 | End: 2025-01-21 | Stop reason: SURG

## 2025-01-21 RX ORDER — PROTAMINE SULFATE 10 MG/ML
INJECTION, SOLUTION INTRAVENOUS
Status: DISCONTINUED | OUTPATIENT
Start: 2025-01-21 | End: 2025-01-21 | Stop reason: HOSPADM

## 2025-01-21 RX ORDER — SODIUM CHLORIDE 9 MG/ML
INJECTION, SOLUTION INTRAVENOUS CONTINUOUS PRN
Status: DISCONTINUED | OUTPATIENT
Start: 2025-01-21 | End: 2025-01-21 | Stop reason: SURG

## 2025-01-21 RX ORDER — PROPOFOL 10 MG/ML
VIAL (ML) INTRAVENOUS AS NEEDED
Status: DISCONTINUED | OUTPATIENT
Start: 2025-01-21 | End: 2025-01-21 | Stop reason: SURG

## 2025-01-21 RX ORDER — FAMOTIDINE 20 MG/1
20 TABLET, FILM COATED ORAL ONCE
Status: CANCELLED | OUTPATIENT
Start: 2025-01-21 | End: 2025-01-21

## 2025-01-21 RX ORDER — DEXAMETHASONE SODIUM PHOSPHATE 4 MG/ML
INJECTION, SOLUTION INTRA-ARTICULAR; INTRALESIONAL; INTRAMUSCULAR; INTRAVENOUS; SOFT TISSUE AS NEEDED
Status: DISCONTINUED | OUTPATIENT
Start: 2025-01-21 | End: 2025-01-21 | Stop reason: SURG

## 2025-01-21 RX ORDER — MIDAZOLAM HYDROCHLORIDE 1 MG/ML
0.5 INJECTION, SOLUTION INTRAMUSCULAR; INTRAVENOUS
Status: CANCELLED | OUTPATIENT
Start: 2025-01-21

## 2025-01-21 RX ORDER — HEPARIN SODIUM 1000 [USP'U]/ML
INJECTION, SOLUTION INTRAVENOUS; SUBCUTANEOUS
Status: DISCONTINUED | OUTPATIENT
Start: 2025-01-21 | End: 2025-01-21 | Stop reason: HOSPADM

## 2025-01-21 RX ORDER — SODIUM CHLORIDE 0.9 % (FLUSH) 0.9 %
10 SYRINGE (ML) INJECTION AS NEEDED
Status: DISCONTINUED | OUTPATIENT
Start: 2025-01-21 | End: 2025-01-21 | Stop reason: HOSPADM

## 2025-01-21 RX ORDER — NITROGLYCERIN 0.4 MG/1
0.4 TABLET SUBLINGUAL
Status: DISCONTINUED | OUTPATIENT
Start: 2025-01-21 | End: 2025-01-21 | Stop reason: HOSPADM

## 2025-01-21 RX ORDER — SODIUM CHLORIDE 0.9 % (FLUSH) 0.9 %
10 SYRINGE (ML) INJECTION EVERY 12 HOURS SCHEDULED
Status: CANCELLED | OUTPATIENT
Start: 2025-01-21

## 2025-01-21 RX ORDER — SODIUM CHLORIDE 0.9 % (FLUSH) 0.9 %
10 SYRINGE (ML) INJECTION AS NEEDED
Status: CANCELLED | OUTPATIENT
Start: 2025-01-21

## 2025-01-21 RX ORDER — LIDOCAINE HYDROCHLORIDE 10 MG/ML
0.5 INJECTION, SOLUTION EPIDURAL; INFILTRATION; INTRACAUDAL; PERINEURAL ONCE AS NEEDED
Status: CANCELLED | OUTPATIENT
Start: 2025-01-21

## 2025-01-21 RX ORDER — ACETAMINOPHEN 325 MG/1
650 TABLET ORAL EVERY 4 HOURS PRN
Status: DISCONTINUED | OUTPATIENT
Start: 2025-01-21 | End: 2025-01-21 | Stop reason: HOSPADM

## 2025-01-21 RX ORDER — SODIUM CHLORIDE 9 MG/ML
40 INJECTION, SOLUTION INTRAVENOUS AS NEEDED
Status: DISCONTINUED | OUTPATIENT
Start: 2025-01-21 | End: 2025-01-21 | Stop reason: HOSPADM

## 2025-01-21 RX ORDER — ONDANSETRON 2 MG/ML
4 INJECTION INTRAMUSCULAR; INTRAVENOUS EVERY 6 HOURS PRN
Status: DISCONTINUED | OUTPATIENT
Start: 2025-01-21 | End: 2025-01-21 | Stop reason: HOSPADM

## 2025-01-21 RX ORDER — SODIUM CHLORIDE 0.9 % (FLUSH) 0.9 %
10 SYRINGE (ML) INJECTION EVERY 12 HOURS SCHEDULED
Status: DISCONTINUED | OUTPATIENT
Start: 2025-01-21 | End: 2025-01-21 | Stop reason: HOSPADM

## 2025-01-21 RX ORDER — LIDOCAINE HYDROCHLORIDE 10 MG/ML
INJECTION, SOLUTION EPIDURAL; INFILTRATION; INTRACAUDAL; PERINEURAL
Status: DISCONTINUED | OUTPATIENT
Start: 2025-01-21 | End: 2025-01-21 | Stop reason: HOSPADM

## 2025-01-21 RX ORDER — SODIUM CHLORIDE, SODIUM LACTATE, POTASSIUM CHLORIDE, CALCIUM CHLORIDE 600; 310; 30; 20 MG/100ML; MG/100ML; MG/100ML; MG/100ML
9 INJECTION, SOLUTION INTRAVENOUS CONTINUOUS
Status: CANCELLED | OUTPATIENT
Start: 2025-01-22 | End: 2025-01-22

## 2025-01-21 RX ORDER — LIDOCAINE HYDROCHLORIDE 10 MG/ML
INJECTION, SOLUTION INFILTRATION; PERINEURAL AS NEEDED
Status: DISCONTINUED | OUTPATIENT
Start: 2025-01-21 | End: 2025-01-21 | Stop reason: SURG

## 2025-01-21 RX ORDER — BUPIVACAINE HCL/0.9 % NACL/PF 0.125 %
PLASTIC BAG, INJECTION (ML) EPIDURAL AS NEEDED
Status: DISCONTINUED | OUTPATIENT
Start: 2025-01-21 | End: 2025-01-21 | Stop reason: SURG

## 2025-01-21 RX ORDER — FAMOTIDINE 10 MG/ML
20 INJECTION, SOLUTION INTRAVENOUS ONCE
Status: CANCELLED | OUTPATIENT
Start: 2025-01-21 | End: 2025-01-21

## 2025-01-21 RX ORDER — ROCURONIUM BROMIDE 10 MG/ML
INJECTION, SOLUTION INTRAVENOUS AS NEEDED
Status: DISCONTINUED | OUTPATIENT
Start: 2025-01-21 | End: 2025-01-21 | Stop reason: SURG

## 2025-01-21 RX ADMIN — SODIUM CHLORIDE: 9 INJECTION, SOLUTION INTRAVENOUS at 07:50

## 2025-01-21 RX ADMIN — ONDANSETRON 4 MG: 2 INJECTION INTRAMUSCULAR; INTRAVENOUS at 09:40

## 2025-01-21 RX ADMIN — PHENYLEPHRINE HYDROCHLORIDE 1 MCG/KG/MIN: 10 INJECTION INTRAVENOUS at 08:19

## 2025-01-21 RX ADMIN — PROPOFOL 100 MG: 10 INJECTION, EMULSION INTRAVENOUS at 07:55

## 2025-01-21 RX ADMIN — Medication 100 MCG: at 08:17

## 2025-01-21 RX ADMIN — SUGAMMADEX 200 MG: 100 INJECTION, SOLUTION INTRAVENOUS at 09:45

## 2025-01-21 RX ADMIN — ROCURONIUM BROMIDE 50 MG: 10 INJECTION INTRAVENOUS at 07:55

## 2025-01-21 RX ADMIN — DEXAMETHASONE SODIUM PHOSPHATE 4 MG: 4 INJECTION, SOLUTION INTRAMUSCULAR; INTRAVENOUS at 08:01

## 2025-01-21 RX ADMIN — ROCURONIUM BROMIDE 20 MG: 10 INJECTION INTRAVENOUS at 08:51

## 2025-01-21 RX ADMIN — ROCURONIUM BROMIDE 10 MG: 10 INJECTION INTRAVENOUS at 09:25

## 2025-01-21 RX ADMIN — LIDOCAINE HYDROCHLORIDE 50 MG: 10 INJECTION, SOLUTION INFILTRATION; PERINEURAL at 07:55

## 2025-01-21 RX ADMIN — Medication 100 MCG: at 08:14

## 2025-01-21 NOTE — ANESTHESIA POSTPROCEDURE EVALUATION
Patient: Jason Lane    Procedure Summary       Date: 01/21/25 Room / Location: [facility] / [facility]    Anesthesia Start: 0741 Anesthesia Stop: 0956    Procedure: Redo PVA w/ PFA. CTA prior. No meds to hold. Diagnosis:       Persistent atrial fibrillation      (afib)    Providers: sAh Horvath MD Provider: Andrea Mcintyre MD    Anesthesia Type: general ASA Status: 3            Anesthesia Type: general    Vitals  Vitals Value Taken Time   /69 01/21/25 0956   Temp 97.1 °F (36.2 °C) 01/21/25 0956   Pulse 65 01/21/25 0956   Resp 15 01/21/25 0956   SpO2 100 % 01/21/25 0956           Post Anesthesia Care and Evaluation    Patient location during evaluation: PACU  Patient participation: complete - patient participated  Level of consciousness: awake and alert  Pain score: 0  Pain management: adequate    Airway patency: patent  Anesthetic complications: No anesthetic complications  PONV Status: none  Cardiovascular status: hemodynamically stable and acceptable  Respiratory status: nonlabored ventilation, acceptable and nasal cannula  Hydration status: acceptable

## 2025-01-21 NOTE — ANESTHESIA PREPROCEDURE EVALUATION
" Anesthesia Evaluation     Patient summary reviewed and Nursing notes reviewed   NPO Solid Status: > 8 hours  NPO Liquid Status: > 2 hours           Airway   Mallampati: II  TM distance: >3 FB  Neck ROM: full  No difficulty expected  Dental - normal exam   (+) edentulous, upper dentures and lower dentures    Pulmonary - normal exam   (+) a smoker (2012) Former, COPD moderate,shortness of breath, recent URI  (-) sleep apnea  Cardiovascular - normal exam    ECG reviewed    (+) hypertension, dysrhythmias (eliquis yesterday) Paroxysmal Atrial Fib, CHF Systolic <55%, JONES  (-) past MI, cardiac stents    ROS comment: ECG  NSR NS ST abn    ECHO 2024 EF 55%  NSC since 2016 Echo 8/22: EF 48%, no significant valve disease    Neuro/Psych  (+) syncope  (-) seizures, CVA  GI/Hepatic/Renal/Endo    (-) no renal disease, diabetes, no thyroid disorder    Musculoskeletal (-) negative ROS    Abdominal  - normal exam    Bowel sounds: normal.   Substance History   (+) alcohol use (56 drinks/week)     OB/GYN negative ob/gyn ROS         Other        ROS/Med Hx Other: Recent v \"cold' and high WCC no fever chest clear to auscultation   Will repeat WCC                Anesthesia Plan    ASA 3     general     (Re check WCC )  intravenous induction     Anesthetic plan, risks, benefits, and alternatives have been provided, discussed and informed consent has been obtained with: patient.    Plan discussed with CRNA.    CODE STATUS:         "

## 2025-01-21 NOTE — ANESTHESIA PROCEDURE NOTES
Airway  Urgency: elective    Date/Time: 1/21/2025 7:57 AM  Airway not difficult    General Information and Staff    Patient location during procedure: OR  CRNA/CAA: Kerry Roth CRNA    Indications and Patient Condition  Indications for airway management: airway protection    Preoxygenated: yes  MILS not maintained throughout  Mask difficulty assessment: 1 - vent by mask    Final Airway Details  Final airway type: endotracheal airway      Successful airway: ETT  Cuffed: yes   Successful intubation technique: video laryngoscopy  Endotracheal tube insertion site: oral  Blade: Florencia  Blade size: 3  ETT size (mm): 7.5  Cormack-Lehane Classification: grade I - full view of glottis  Placement verified by: chest auscultation and capnometry   Measured from: lips  ETT/EBT  to lips (cm): 20  Number of attempts at approach: 1  Assessment: lips, teeth, and gum same as pre-op and atraumatic intubation    Additional Comments  Negative epigastric sounds, Breath sound equal bilaterally with symmetric chest rise and fall

## 2025-01-21 NOTE — H&P
[facility]  HISTORY AND PHYSICAL    Jason Lane  : 1955  MRN:[id number]    Date of Admission:2025    PCP: Anali Weinberg PA-C    Chief Complaint: Persistent atrial fibrillation    PROBLEM LIST:   Persistent Atrial fibrillation:  Rapid ventricular response of unknown duration, hospitalized on 2012.   Pradaxa and sotalol initiated, 2012.   Echocardiogram, 2012 with mild mitral regurgitation, ejection fraction 35% to 40%.   Echocardiogram, May 2012: Left ventricular ejection fraction of 45% to 50%, mild TR.   Echocardiogram, 12/15/2014: EF 50% to 55%. Mild-to-moderate TR.  Sotalol discontinued   AMY guided ECV with initiation of Amiodarone 2022  Amiodarone discontinued 2023   s/p EPS + PVA, RFA of left atrial roof of the left posterior wall 8/15/23  Dilated cardiomyopathy new onset 2022 diagnosed by AMY guided cardioversion  Started on amiodarone   AMY 2022: EF 48%  Alcohol abuse.   Tobacco abuse.   Hypertension.   Acute systolic congestive heart failure secondary to atrial fibrillation with rapid ventricular response.   Syncope:  Left and right heart catheterization, Dr. Larson, 03/15/2013, with normal pressures and normal coronary arteries: EF 55%    ALLERGIES: No Known Allergies    HOME MEDICINES:   Prior to Admission Medications       Prescriptions Last Dose Informant Patient Reported? Taking?    brimonidine (ALPHAGAN) 0.2 % ophthalmic solution 2025 Self Yes Yes    Administer 1 drop to both eyes 2 (Two) Times a Day.    carvedilol (COREG) 25 MG tablet 2025 Self No Yes    Take 0.5 tablets by mouth 2 (Two) Times a Day.    dorzolamide (TRUSOPT) 2 % ophthalmic solution 2025 Self Yes Yes    Administer 1 drop to both eyes 2 (Two) Times a Day.    Eliquis 5 MG tablet tablet 2025 Self No Yes    Take 1 tablet by mouth Every 12 (Twelve) Hours.    latanoprost (XALATAN) 0.005 % ophthalmic solution 2025 Self  Yes Yes    Administer 1 drop to both eyes Every Night.    lisinopril (PRINIVIL,ZESTRIL) 20 MG tablet 1/21/2025 Self No Yes    TAKE ONE TABLET BY MOUTH TWICE DAILY    timolol (TIMOPTIC) 0.5 % ophthalmic solution 1/20/2025 Self Yes Yes    Administer 1 drop to both eyes Daily.    albuterol sulfate  (90 Base) MCG/ACT inhaler Unknown Self Yes No    Inhale 2 puffs Every 4 (Four) Hours As Needed for Wheezing.    Fluticasone-Umeclidin-Vilant (Trelegy Ellipta) 100-62.5-25 MCG/ACT inhaler Unknown Self No No    Inhale 1 puff Daily.    hydrocortisone 2.5 % lotion Unknown Self Yes No    Apply 1 Application topically to the appropriate area as directed.    ketoconazole (NIZORAL) 2 % shampoo Unknown Self Yes No    Apply  topically to the appropriate area as directed 1 (One) Time Per Week.    promethazine (PHENERGAN) 25 MG tablet Unknown Self No No    Take 1 tablet by mouth Every 6 (Six) Hours As Needed for Nausea or Vomiting.            Subjective     HPI: Jason Lane is a 69 y.o. male with a past medical history listed above presents to [facility] today for pulmonary vein ablation with general anesthesia.  Has had atrial fibrillation for many years.  For several years he was controlled with sotalol was stopped due to concerns for long-term side effects.  Patient did not want to try amiodarone due to stage IV COPD, history of thyroid problems as well as heavy alcohol use. Patient denies chest pain, shortness of breath, palpitations, LH, and syncope.  Patient is anticoagulated and denies any bleeding complications or strokelike symptoms.    ROS: All systems have been reviewed and are negative with the exception of those mentioned in the HPI and problem list above.    Past Medical History:   Past Medical History:   Diagnosis Date    Acute systolic congestive heart failure     Acute systolic congestive heart failure secondary to atrial fibrillation with rapid ventricular response.    Alcohol abuse     Atrial  "fibrillation     COPD (chronic obstructive pulmonary disease)     Hearing loss     Hypertension     Syncope     Left and right heart catheterization, Dr. Larson, 03/15/2013, with normal pressures and normal coronary arteries: EF 55%.    Tobacco abuse       Surgical History:   Past Surgical History:   Procedure Laterality Date    CARDIAC ELECTROPHYSIOLOGY PROCEDURE N/A 08/15/2023    Procedure: Ablation atrial fibrillation. Hold Eliquis morning of procedure. Schedule in 3 to 4 months;  Surgeon: Jeffrey Goff MD;  Location: [facility] INVASIVE LOCATION;  Service: Cardiovascular;  Laterality: N/A;    COLONOSCOPY      TEETH EXTRACTION      WRIST SURGERY Right          Social History:   Social History     Socioeconomic History    Marital status:     Number of children: 2   Tobacco Use    Smoking status: Former     Current packs/day: 0.00     Average packs/day: 1 pack/day for 42.0 years (42.0 ttl pk-yrs)     Types: Cigarettes     Start date:      Quit date: 2012     Years since quittin.0     Passive exposure: Past    Smokeless tobacco: Never   Vaping Use    Vaping status: Never Used   Substance and Sexual Activity    Alcohol use: Yes     Alcohol/week: 4.0 standard drinks of alcohol     Types: 4 Cans of beer per week     Comment: 4 per day    Drug use: No    Sexual activity: Defer     Family History:   Family History   Problem Relation Age of Onset    Heart disease Mother     Thyroid disease Mother     Hyperlipidemia Mother     Kidney disease Mother     Heart disease Maternal Uncle     Heart disease Paternal Uncle     Anemia Other         cousins       Objective   /82 (BP Location: Left arm, Patient Position: Lying)   Pulse 71   Temp 97.8 °F (36.6 °C) (Temporal)   Resp 16   Ht 180.3 cm (71\")   Wt 89.8 kg (198 lb)   SpO2 99%   BMI 27.62 kg/m²   No intake or output data in the 24 hours ending 25 0721    PHYSICAL EXAM:  CONSTITUTIONAL: Well nourished, cooperative, in no acute " distress  HEENT: Normocephalic, atraumatic, PERRLA, no JVD  CARDIOVASCULAR:  Regular rhythm and normal rate, no murmur, gallop, rub.   RESPIRATORY: Clear to auscultation, normal respiratory effort, no wheezing, rales or ronchi, RA  EXTREMITIES: No gross deformities, no edema. Peripheral pulses are present and equal bilaterally  SKIN: Warm, dry. No bleeding, bruising or rash  NEUROLOGICAL: No focal deficits  PSYCHIATRIC: Normal mood and affect. Behavior is normal     Labs/Diagnostic Data  Results from last 7 days   Lab Units 01/16/25  1159   SODIUM mmol/L 134*   POTASSIUM mmol/L 5.0   CHLORIDE mmol/L 98   CO2 mmol/L 25.0   BUN mg/dL 12   CREATININE mg/dL 1.00   GLUCOSE mg/dL 97   CALCIUM mg/dL 8.9         Results from last 7 days   Lab Units 01/21/25  0703 01/16/25  1159   WBC 10*3/mm3 12.86* 17.24*   HEMOGLOBIN g/dL 12.7* 13.7   HEMATOCRIT % 38.1 41.4   PLATELETS 10*3/mm3 268 240                               EKG/Telemetry: SR    Radiology Data:   No radiology results for the last day   Results for orders placed during the hospital encounter of 02/01/24    Adult Transthoracic Echo Complete w/ Color, Spectral and Contrast if necessary per protocol    Interpretation Summary    Left ventricular systolic function is normal. Estimated left ventricular EF = 55%    Left ventricular diastolic function is consistent with (grade I) impaired relaxation.    The cardiac valves are anatomically and functionally normal.    Estimated right ventricular systolic pressure from tricuspid regurgitation is normal (<35 mmHg).    Compared to echocardiogram performed 8/16/2022, there has been no significant change.       Current Medications:  sodium chloride, 10 mL, Intravenous, Q12H           Assessment:     Atrial fibrillation  [id number] 3 (age, CHF, HTN)   PVA 8/2023  Previously failed sotalol and amiodarone stopped due to long-term side effects  Dilated cardiomyopathy  Echo, 2/2024: EF 55%, grade 1 impaired relaxation   Stage IV  COPD    Plan:     Patient is here today for pulmonary vein ablation with general anesthesia.  Patient has been on anticoagulation with no missed doses last 30 days.  Procedure, risks, and alternatives have been discussed with the patient and he is agreeable to proceed.  Further recommendations to follow.    Electronically signed by SUSANA Duke, 01/21/25, 7:21 AM EST.     Please note that portions of this note were dictated utilizing Dragon dictation.

## 2025-01-22 ENCOUNTER — CALL CENTER PROGRAMS (OUTPATIENT)
Dept: CALL CENTER | Facility: HOSPITAL | Age: 70
End: 2025-01-22
Payer: MEDICARE

## 2025-01-22 NOTE — OUTREACH NOTE
PCI/Device Survey      Flowsheet Row Responses   Facility patient discharged from? [facility]   Procedure date 01/21/25   Procedure (if device, specify in description) Ablation  [Right femoral]   Performing MD Other (annotate)  [Dr. Horvath]   Attempt successful? Yes   Call start time 0851   Call end time 0852   Has the patient had any of the following symptoms since discharge? --  [denies any issues today]   Is the patient taking prescribed medications: Apixaban   Nursing intervention Reminded to continue to take prescribed medications   Does the patient have any of the following symptoms related to the cath/surgical site? --  [denies any issues with site--aware of activity and site precautions, no questions]   Nursing intervention Patient education provided   Does the patient have an appointment scheduled with the cardiologist? Yes   Appointment comments 2/21/25   Did the patient feel prepared to go home on the same day as the procedure? Yes   Is the patient satisfied with the same day discharge process? Yes   PCI/Device call completed Yes   Wrap up additional comments Pt is doing well today---denies any issues, concerns and no questions today            RENETTA ARGUETA - Registered Nurse

## 2025-02-21 ENCOUNTER — OFFICE VISIT (OUTPATIENT)
Dept: CARDIOLOGY | Facility: HOSPITAL | Age: 70
End: 2025-02-21
Payer: MEDICARE

## 2025-02-21 VITALS
RESPIRATION RATE: 18 BRPM | WEIGHT: 206.2 LBS | OXYGEN SATURATION: 99 % | BODY MASS INDEX: 28.87 KG/M2 | DIASTOLIC BLOOD PRESSURE: 68 MMHG | SYSTOLIC BLOOD PRESSURE: 130 MMHG | HEIGHT: 71 IN | HEART RATE: 57 BPM

## 2025-02-21 DIAGNOSIS — Z91.89 AT RISK FOR SLEEP APNEA: ICD-10-CM

## 2025-02-21 DIAGNOSIS — I48.19 PERSISTENT ATRIAL FIBRILLATION: Primary | ICD-10-CM

## 2025-02-21 DIAGNOSIS — I42.0 CARDIOMYOPATHY, DILATED: ICD-10-CM

## 2025-02-21 DIAGNOSIS — I10 PRIMARY HYPERTENSION: ICD-10-CM

## 2025-02-21 NOTE — PROGRESS NOTES
"[facility]    Chief Complaint  Post PVA    Subjective    History of Present Illness {CC  Problem List  Visit  Diagnosis   Encounters  Notes  Medications  Labs  Result Review Imaging  Media :23}     Jason Lane presents to [facility] for   History of Present Illness     69-year-old male with history of persistent atrial fibrillation, dilated cardiomyopathy, alcohol abuse, tobacco abuse, hypertension, heart failure with reduced ejection fraction, syncope, COPD.    History of an ablation 2023.    Current atrial fibrillation status post redo ablation 1/21/2025    Continued on Eliquis and carvedilol.    Echocardiogram 2/1/2024: Normal EF, grade 1 diastolic dysfunction, no significant valvular disease.    Patient reports he is at baseline.  No chest pain, pressure, intermittent dyspnea with a COPD.  No dizziness, near-syncope, syncope, palpitations, edema.    Patient is interested in consideration for the watchman implantation in the future.  Plans to talk to electrophysiology at next follow-up.  No current melena, hematuria.  Groin bruising has resolved.    History of snoring in the past.  Nocturnal O2 use in the past.  No history of sleep study.  History of deviated septum.      Objective     Vital Signs:   Vitals:    02/21/25 1220 02/21/25 1249   BP: 164/74 130/68   BP Location: Left arm    Patient Position: Sitting    Cuff Size: Adult    Pulse: 57    Resp: 18    SpO2: 99%    Weight: 93.5 kg (206 lb 3.2 oz)    Height: 180.3 cm (71\")      Body mass index is 28.76 kg/m².  Physical Exam  Vitals reviewed.   Constitutional:       General: He is not in acute distress.  Cardiovascular:      Rate and Rhythm: Normal rate and regular rhythm.      Heart sounds: No murmur heard.  Pulmonary:      Effort: Pulmonary effort is normal.      Breath sounds: Normal breath sounds.   Musculoskeletal:      Right lower leg: No edema.      " Left lower leg: No edema.   Skin:     Coloration: Skin is not pale.   Neurological:      Mental Status: He is alert.   Psychiatric:         Mood and Affect: Mood normal.         Behavior: Behavior normal. Behavior is cooperative.              Result Review  Data Reviewed:{ Labs  Result Review  Imaging  Med Tab  Media :23}                 Assessment and Plan {CC Problem List  Visit Diagnosis  ROS  Review (Popup)  Health Maintenance  Quality  BestPractice  Medications  SmartSets  SnapShot Encounters  Media :23}   1. Persistent atrial fibrillation  Redo ablation 1/21/2025    No known A-fib recurrence noted    Continued on Eliquis and carvedilol.    Patient would like to be considered for watchman evaluation with his chronic anticoagulation.  Will discuss further with cardiology.  Education provided      Discussed postprocedural expectations and management, role the heart valve center when to call.    - Ambulatory Referral to Sleep Medicine    2. Cardiomyopathy, dilated  Euvolemic.  Normal EF    3. Primary hypertension  Repeat blood pressure improved.  Continue lisinopril, carvedilol    4. At risk for sleep apnea    - Ambulatory Referral to Sleep Medicine          Follow Up {Instructions Charge Capture  Follow-up Communications :23}   Return if symptoms worsen or fail to improve.    Patient was given instructions and counseling regarding his condition or for health maintenance advice. Please see specific information pulled into the AVS if appropriate.  Patient was instructed to call the [facility] with any questions, concerns, or worsening symptoms.

## 2025-03-06 ENCOUNTER — OFFICE VISIT (OUTPATIENT)
Dept: SLEEP MEDICINE | Age: 70
End: 2025-03-06
Payer: MEDICARE

## 2025-03-06 VITALS
SYSTOLIC BLOOD PRESSURE: 130 MMHG | DIASTOLIC BLOOD PRESSURE: 80 MMHG | OXYGEN SATURATION: 99 % | HEART RATE: 69 BPM | TEMPERATURE: 98.8 F | HEIGHT: 71 IN | BODY MASS INDEX: 28.14 KG/M2 | WEIGHT: 201 LBS

## 2025-03-06 DIAGNOSIS — R29.818 SUSPECTED SLEEP APNEA: ICD-10-CM

## 2025-03-06 DIAGNOSIS — I48.19 PERSISTENT ATRIAL FIBRILLATION: Primary | ICD-10-CM

## 2025-03-06 NOTE — PROGRESS NOTES
Jason Lane is a 69 y.o. male.   Chief Complaint   Patient presents with    Atrial Fibrillation       HPI     69 y.o. male seen in consultation at the request of Domenica Pereira APRN for evaluation of the above.     He has a history of persistent atrial fibrillation as well as a dilated cardiomyopathy and stage IV COPD among other problems.  He is chronically anticoagulated for his atrial fibrillation.  His most recent echocardiogram revealed a normal EF.  At the time of his last visit to cardiology he was referred to the sleep center due to his risk for sleep apnea.    Patient is here accompanied by his wife.  They say that he previously snored but recently he has not . he gets about 9 or 9 and half hours of sleep per night.  He will awaken 2 times.  He has no problems initiating or reinitiating sleep.  He denies any daytime somnolence.  His [place] Sleepiness Scale is normal.  He has no morning headaches dry mouth or sore throat.     [place] Scale is: 3/24    The patient's relevant past medical, surgical, family, and social history reviewed and updated in Epic as appropriate.    Current medications are:   Current Outpatient Medications:     albuterol sulfate  (90 Base) MCG/ACT inhaler, Inhale 2 puffs Every 4 (Four) Hours As Needed for Wheezing., Disp: , Rfl:     brimonidine (ALPHAGAN) 0.2 % ophthalmic solution, Administer 1 drop to both eyes 2 (Two) Times a Day., Disp: , Rfl:     carvedilol (COREG) 25 MG tablet, Take 0.5 tablets by mouth 2 (Two) Times a Day., Disp: 60 tablet, Rfl: 6    dorzolamide (TRUSOPT) 2 % ophthalmic solution, Administer 1 drop to both eyes 2 (Two) Times a Day., Disp: , Rfl:     Eliquis 5 MG tablet tablet, Take 1 tablet by mouth Every 12 (Twelve) Hours., Disp: 60 tablet, Rfl: 6    Fluticasone-Umeclidin-Vilant (Trelegy Ellipta) 100-62.5-25 MCG/ACT inhaler, Inhale 1 puff Daily., Disp: 1 each, Rfl: 11    hydrocortisone 2.5 % lotion, Apply 1 Application topically to the  "appropriate area as directed., Disp: , Rfl:     ketoconazole (NIZORAL) 2 % shampoo, Apply  topically to the appropriate area as directed 1 (One) Time Per Week., Disp: , Rfl:     latanoprost (XALATAN) 0.005 % ophthalmic solution, Administer 1 drop to both eyes Every Night., Disp: , Rfl:     lisinopril (PRINIVIL,ZESTRIL) 20 MG tablet, TAKE ONE TABLET BY MOUTH TWICE DAILY, Disp: 60 tablet, Rfl: 3    promethazine (PHENERGAN) 25 MG tablet, Take 1 tablet by mouth Every 6 (Six) Hours As Needed for Nausea or Vomiting., Disp: 20 tablet, Rfl: 1    timolol (TIMOPTIC) 0.5 % ophthalmic solution, Administer 1 drop to both eyes Daily., Disp: , Rfl: .    Review of Systems    Review of Systems  ROS documented in patient questionnaire ×14 systems.  Reviewed with patient.  Otherwise negative except as noted in HPI.    Physical Exam    Blood pressure 130/80, pulse 69, temperature 98.8 °F (37.1 °C), temperature source Temporal, height 180.3 cm (71\"), weight 91.2 kg (201 lb), SpO2 99%. Body mass index is 28.03 kg/m².    Physical Exam  Vitals and nursing note reviewed.   Constitutional:       Appearance: Normal appearance. He is well-developed.   HENT:      Head: Normocephalic and atraumatic.      Nose: Nose normal.      Mouth/Throat:      Mouth: Mucous membranes are moist.      Pharynx: Oropharynx is clear. No oropharyngeal exudate.      Comments: Class IV airway  Eyes:      General: No scleral icterus.     Conjunctiva/sclera: Conjunctivae normal.   Neck:      Thyroid: No thyromegaly.      Trachea: No tracheal deviation.   Cardiovascular:      Rate and Rhythm: Normal rate and regular rhythm.      Heart sounds: No murmur heard.     No friction rub. No gallop.   Pulmonary:      Effort: Pulmonary effort is normal. No respiratory distress.      Breath sounds: No wheezing or rales.   Musculoskeletal:         General: No deformity. Normal range of motion.   Skin:     General: Skin is warm and dry.      Findings: No rash.   Neurological:      " Mental Status: He is alert and oriented to person, place, and time.   Psychiatric:         Behavior: Behavior normal.         Thought Content: Thought content normal.         DATA:    Reviewed 2/21/2025 note from SUSANA Patrick    Reviewed bed partner questionnaire    ASSESSMENT:    Problem List Items Addressed This Visit       Atrial fibrillation - Primary    Overview      Rapid ventricular response of unknown duration, hospitalized on 01/01/2012.   sotalol initiated, 01/02/2012, discontinued 5/13/22  Amiodarone initiated 5/13/22            Other Visit Diagnoses       Suspected sleep apnea                69-year-old male referred to the sleep center.  He was referred for evaluation of potential obstructive sleep apnea.    However, in discussion with the patient, the current time he has no signs or symptoms that would suggest sleep apnea.  He has no snoring, witnessed apneas, and or daytime somnolence.  He does not have frequent awakenings.    He is at theoretical increased risk for sleep apnea based solely upon his demographics.    PLAN:    I discussed the possible diagnosis of sleep apnea with him as well as its potential relationship to his atrial fibrillation and the potential reduction in recurrent atrial fibrillation with diagnosis and treatment.  He voiced understanding.  He was not amenable to CPAP therapy in principle.  Given his lack of signs and symptoms of sleep apnea I do not see much benefit with pursuing a diagnosis given his lack of interest in wearing CPAP to potentially prevent recurrent atrial fibrillation  I will be available for further consultation or testing as needed.  I did relay to the patient and his wife that if he should develop any increasingly disturbed sleep, daytime somnolence, or significant snoring that he be reevaluated.    I have reviewed the results of my evaluation and impression and discussed my recommendations in detail with the patient.    Signed by  Kevon MONTOYA  MD Edmund    March 6, 2025      CC: Anali Weinberg, Domenica Kunz, SUSANA

## 2025-04-07 RX ORDER — APIXABAN 5 MG/1
5 TABLET, FILM COATED ORAL
Qty: 60 TABLET | Refills: 6 | Status: SHIPPED | OUTPATIENT
Start: 2025-04-07

## 2025-05-01 ENCOUNTER — OFFICE VISIT (OUTPATIENT)
Dept: CARDIOLOGY | Facility: CLINIC | Age: 70
End: 2025-05-01
Payer: MEDICARE

## 2025-05-01 VITALS
SYSTOLIC BLOOD PRESSURE: 150 MMHG | DIASTOLIC BLOOD PRESSURE: 78 MMHG | BODY MASS INDEX: 27.66 KG/M2 | WEIGHT: 197.6 LBS | HEIGHT: 71 IN | HEART RATE: 55 BPM | OXYGEN SATURATION: 100 %

## 2025-05-01 DIAGNOSIS — I42.0 CARDIOMYOPATHY, DILATED: ICD-10-CM

## 2025-05-01 DIAGNOSIS — I10 PRIMARY HYPERTENSION: Chronic | ICD-10-CM

## 2025-05-01 DIAGNOSIS — I48.19 PERSISTENT ATRIAL FIBRILLATION: Primary | Chronic | ICD-10-CM

## 2025-05-01 NOTE — PROGRESS NOTES
Jason Lane  [id number]  1955  [phone]      [facility] CARDIOLOGY     Referring Provider: No ref. provider found     Anali Weinberg, PA-C  [address]  [place] [id number]    Chief Complaint   Patient presents with    Persistent atrial fibrillation       Problem List  Persistent Atrial fibrillation:  Rapid ventricular response of unknown duration, hospitalized on 01/01/2012.   Pradaxa and sotalol initiated, 01/02/2012.   Echocardiogram, January 2012 with mild mitral regurgitation, ejection fraction 35% to 40%.   Echocardiogram, May 2012: Left ventricular ejection fraction of 45% to 50%, mild TR.   Echocardiogram, 12/15/2014: EF 50% to 55%. Mild-to-moderate TR.  Sotalol discontinued   AMY guided ECV with initiation of Amiodarone 5/13/2022  Amiodarone discontinued 5/2023   s/p EPS + PVA, RFA of left atrial roof of the left posterior wall 8/15/23 with Dr. Goff  S/p PVI & PWI 12/2024 with Dr. Horvath  Dilated cardiomyopathy new onset 5/13/2022 diagnosed by AMY guided cardioversion  Started on amiodarone   AMY 5/13/2022: EF 48%  Alcohol abuse.   Tobacco abuse.   Hypertension.   Acute systolic congestive heart failure secondary to atrial fibrillation with rapid ventricular response.   Syncope:  Left and right heart catheterization, Dr. Larson, 03/15/2013, with normal pressures and normal coronary arteries: EF 55%      History of Present Illness   Jason Lane is a 69 y.o. male who presents to my electrophysiology clinic for follow up of AF.  Status post PVI and PWI on 12/2024. Maintaining SR and doing well. He is interested in getting LAAO due to his unsteady gait and economic non-compliance 2/2 financial hardship. Currently on AC, but hard time affording both apixaban and trilogy    Outpatient Medications Marked as Taking for the 5/1/25 encounter (Office Visit) with Ash Horvath MD   Medication Sig Dispense Refill    albuterol sulfate  (90 Base) MCG/ACT  "inhaler Inhale 2 puffs Every 4 (Four) Hours As Needed for Wheezing.      brimonidine (ALPHAGAN) 0.2 % ophthalmic solution Administer 1 drop to both eyes 2 (Two) Times a Day.      carvedilol (COREG) 25 MG tablet Take 0.5 tablets by mouth 2 (Two) Times a Day. 60 tablet 6    dorzolamide (TRUSOPT) 2 % ophthalmic solution Administer 1 drop to both eyes 2 (Two) Times a Day.      Eliquis 5 MG tablet tablet Take 1 tablet by mouth Every 12 (Twelve) Hours. 60 tablet 6    Fluticasone-Umeclidin-Vilant (Trelegy Ellipta) 100-62.5-25 MCG/ACT inhaler Inhale 1 puff Daily. 1 each 11    hydrocortisone 2.5 % lotion Apply 1 Application topically to the appropriate area as directed.      ketoconazole (NIZORAL) 2 % shampoo Apply  topically to the appropriate area as directed 1 (One) Time Per Week.      latanoprost (XALATAN) 0.005 % ophthalmic solution Administer 1 drop to both eyes Every Night.      lisinopril (PRINIVIL,ZESTRIL) 20 MG tablet TAKE ONE TABLET BY MOUTH TWICE DAILY 60 tablet 3    promethazine (PHENERGAN) 25 MG tablet Take 1 tablet by mouth Every 6 (Six) Hours As Needed for Nausea or Vomiting. 20 tablet 1    timolol (TIMOPTIC) 0.5 % ophthalmic solution Administer 1 drop to both eyes Daily.              Physical Exam  Vitals:    05/01/25 1015   BP: 150/78   BP Location: Left arm   Patient Position: Sitting   Cuff Size: Adult   Pulse: 55   SpO2: 100%   Weight: 89.6 kg (197 lb 9.6 oz)   Height: 180.3 cm (71\")     Body mass index is 27.56 kg/m².  Vitals and nursing note reviewed.   Constitutional:       Appearance: Healthy appearance.   HENT:      Head: Normocephalic and atraumatic.      Nose: Nose normal.   Neck:      Vascular: No JVD.   Pulmonary:      Effort: Pulmonary effort is normal.      Breath sounds: Normal breath sounds.   Cardiovascular:      PMI at left midclavicular line. Normal rate. Regular rhythm. Normal S1. Normal S2.       Murmurs: There is no murmur.      No gallop.    Edema:     Peripheral edema absent. "   Skin:     General: Skin is warm and dry.   Neurological:      Mental Status: Oriented to person, place and time.   Psychiatric:         Behavior: Behavior normal.          Diagnostic Data  Procedures    Sinus bradycardia 55 bpm    QTc 417    Lab Results   Component Value Date    GLUCOSE 94 01/21/2025    CALCIUM 8.7 01/21/2025     (L) 01/21/2025    K 4.5 01/21/2025    CO2 26.0 01/21/2025     01/21/2025    BUN 9 01/21/2025    CREATININE 0.95 01/21/2025    EGFRIFAFRI 87 09/17/2021    EGFRIFNONA 72 09/17/2021    BCR 9.5 01/21/2025    ANIONGAP 8.0 01/21/2025     Lab Results   Component Value Date    WBC 12.86 (H) 01/21/2025    HGB 12.7 (L) 01/21/2025    HCT 38.1 01/21/2025    MCV 94.3 01/21/2025     01/21/2025     Lab Results   Component Value Date    INR 1.56 (H) 08/23/2024    INR 1.33 (H) 08/08/2023    INR 1.02 01/16/2015    PROTIME 18.8 (H) 08/23/2024    PROTIME 16.6 (H) 08/08/2023    PROTIME 10.9 01/16/2015     Lab Results   Component Value Date    TSH 0.640 12/17/2024    X7MDQUH 8.2 08/16/2022       I personally viewed and interpreted the patient's EKG/Telemetry/lab data    Jason Lane  reports that he quit smoking about 13 years ago. His smoking use included cigarettes. He started smoking about 55 years ago. He has a 42 pack-year smoking history. He has been exposed to tobacco smoke. He has never used smokeless tobacco. I have educated him on the risk of diseases from using tobacco products such as cancer, COPD, and heart disease.     I spent 3  minutes counseling the patient.    ACP discussion was declined by the patient. Patient does not have an advance directive, declines further assistance.    Assessment and Plan  Diagnoses and all orders for this visit:    1. Persistent atrial fibrillation (Primary)    2. Primary hypertension    3. Cardiomyopathy, dilated        Persistent AF  - s/p repeat PVI and PWI with me 12/2024  - CHADSVASC (age, HF, HTN) 3: currently on  apixaban   - high bleeding risk due to his gait instability   - economical non-compliance due to financial hardship  -After extensive conversation regarding risks, benefits, or alternatives to the therapy (patient voiced understanding of risks, benefits, and alternative), patient elected to proceed with the LAAO procedure     - LAAO with Amulet   - use prior CTA      Follow-Up  Return for Follow up after Procedure.      Thank you for allowing me to participate in the care of your patient. Please to not hesitate to contact me with additional questions or concerns.

## 2025-05-12 ENCOUNTER — TELEPHONE (OUTPATIENT)
Dept: CARDIOLOGY | Facility: CLINIC | Age: 70
End: 2025-05-12
Payer: MEDICARE

## 2025-05-12 NOTE — TELEPHONE ENCOUNTER
lvm for pt to return call at [phone] regarding LAAO procedure.   Packet mailed.  Sherrie Cleary RN

## 2025-05-13 ENCOUNTER — DOCUMENTATION (OUTPATIENT)
Dept: CARDIOLOGY | Facility: HOSPITAL | Age: 70
End: 2025-05-13
Payer: MEDICARE

## 2025-05-13 DIAGNOSIS — R29.6 FALLS: ICD-10-CM

## 2025-05-13 DIAGNOSIS — I48.19 PERSISTENT ATRIAL FIBRILLATION: Primary | ICD-10-CM

## 2025-05-14 NOTE — PROGRESS NOTES
Jason Lane, read the information in the SDM tool, Preventing Stroke in Atrial Fibrillation: A Patient's Guide to Shared Decision Making.  The patient has decided to pursue a LAAO procedure.  They are a suitable candidate for short term blood thinner therapy but is unable to take long term anticoagulation due to history of falls.

## 2025-05-15 RX ORDER — LISINOPRIL 20 MG/1
20 TABLET ORAL 2 TIMES DAILY
Qty: 60 TABLET | Refills: 3 | Status: SHIPPED | OUTPATIENT
Start: 2025-05-15

## 2025-05-19 ENCOUNTER — HOSPITAL ENCOUNTER (OUTPATIENT)
Dept: CT IMAGING | Facility: HOSPITAL | Age: 70
Discharge: HOME | End: 2025-05-19
Admitting: INTERNAL MEDICINE
Payer: MEDICARE

## 2025-05-19 DIAGNOSIS — R91.1 PULMONARY NODULE: ICD-10-CM

## 2025-05-19 PROCEDURE — 71250 CT THORAX DX C-: CPT

## 2025-05-20 ENCOUNTER — OFFICE VISIT (OUTPATIENT)
Dept: PULMONOLOGY | Facility: CLINIC | Age: 70
End: 2025-05-20
Payer: MEDICARE

## 2025-05-20 VITALS
OXYGEN SATURATION: 100 % | HEART RATE: 56 BPM | HEIGHT: 71 IN | TEMPERATURE: 98.4 F | BODY MASS INDEX: 27.21 KG/M2 | RESPIRATION RATE: 16 BRPM | WEIGHT: 194.38 LBS | DIASTOLIC BLOOD PRESSURE: 76 MMHG | SYSTOLIC BLOOD PRESSURE: 122 MMHG

## 2025-05-20 DIAGNOSIS — Z87.891 FORMER SMOKER: ICD-10-CM

## 2025-05-20 DIAGNOSIS — R06.09 DOE (DYSPNEA ON EXERTION): ICD-10-CM

## 2025-05-20 DIAGNOSIS — J44.9 COPD MIXED TYPE: ICD-10-CM

## 2025-05-20 DIAGNOSIS — R91.1 PULMONARY NODULE: Primary | ICD-10-CM

## 2025-05-20 RX ORDER — ALBUTEROL SULFATE 90 UG/1
2 INHALANT RESPIRATORY (INHALATION) EVERY 4 HOURS PRN
Qty: 18 G | Refills: 11 | Status: SHIPPED | OUTPATIENT
Start: 2025-05-20

## 2025-05-20 RX ORDER — TIMOLOL HEMIHYDRATE 5 MG/ML
1 SOLUTION/ DROPS OPHTHALMIC DAILY
COMMUNITY

## 2025-05-20 RX ORDER — FLUTICASONE FUROATE, UMECLIDINIUM BROMIDE AND VILANTEROL TRIFENATATE 100; 62.5; 25 UG/1; UG/1; UG/1
1 POWDER RESPIRATORY (INHALATION)
Qty: 1 EACH | Refills: 11 | Status: SHIPPED | OUTPATIENT
Start: 2025-05-20

## 2025-05-20 NOTE — PROGRESS NOTES
PULMONARY  NOTE    Chief Complaint     Stage IV COPD, former smoker, pulmonary nodules, cardiomyopathy, persistent atrial fibrillation, anticoagulation    History of Present Illness     69-year-old male returns today for follow-up    He has a 90-pack-year smoking history, resolved in January 2012    He has stage IV, very severe, chronic obstructive pulmonary disease  He remains on Trelegy with albuterol  No recent acute exacerbation    He has had abnormal chest imaging that we have been following with serial CT scans  We started in August 2023    He has a history of cardiomyopathy and permanent atrial fibrillation    Patient Active Problem List   Diagnosis    Anemia    Atrial fibrillation    Benign prostatic hyperplasia    Reduced libido    Hypertension    Seasonal allergic rhinitis    Eczema    Alcohol abuse    Chronic low back pain without sciatica    Cardiomyopathy, dilated    Long term current use of antiarrhythmic drug    Chronic anticoagulation    Primary open angle glaucoma (POAG) of both eyes, indeterminate stage    Stage IV (Very severe) COPD    Former smoker (Stopped 2012)    Pulmonary nodule (LLL pleural based)    JONES (dyspnea on exertion)      No Known Allergies    Current Outpatient Medications:     albuterol sulfate  (90 Base) MCG/ACT inhaler, Inhale 2 puffs Every 4 (Four) Hours As Needed for Wheezing., Disp: , Rfl:     brimonidine (ALPHAGAN) 0.2 % ophthalmic solution, Administer 1 drop to both eyes 2 (Two) Times a Day., Disp: , Rfl:     carvedilol (COREG) 25 MG tablet, Take 0.5 tablets by mouth 2 (Two) Times a Day., Disp: 60 tablet, Rfl: 6    dorzolamide (TRUSOPT) 2 % ophthalmic solution, Administer 1 drop to both eyes 2 (Two) Times a Day., Disp: , Rfl:     Eliquis 5 MG tablet tablet, Take 1 tablet by mouth Every 12 (Twelve) Hours., Disp: 60 tablet, Rfl: 6    Fluticasone-Umeclidin-Vilant (Trelegy Ellipta) 100-62.5-25 MCG/ACT inhaler, Inhale 1 puff Daily., Disp: 1 each, Rfl: 11     hydrocortisone 2.5 % lotion, Apply 1 Application topically to the appropriate area as directed., Disp: , Rfl:     ketoconazole (NIZORAL) 2 % shampoo, Apply  topically to the appropriate area as directed 1 (One) Time Per Week., Disp: , Rfl:     latanoprost (XALATAN) 0.005 % ophthalmic solution, Administer 1 drop to both eyes Every Night., Disp: , Rfl:     lisinopril (PRINIVIL,ZESTRIL) 20 MG tablet, TAKE ONE TABLET BY MOUTH TWICE DAILY, Disp: 60 tablet, Rfl: 3    promethazine (PHENERGAN) 25 MG tablet, Take 1 tablet by mouth Every 6 (Six) Hours As Needed for Nausea or Vomiting., Disp: 20 tablet, Rfl: 1    timolol (BETIMOL) 0.5 % ophthalmic solution, Administer 1 drop to both eyes Daily., Disp: , Rfl:     timolol (TIMOPTIC) 0.5 % ophthalmic solution, Administer 1 drop to both eyes Daily., Disp: , Rfl:   MEDICATION LIST AND ALLERGIES REVIEWED.    Family History   Problem Relation Age of Onset    Heart disease Mother     Thyroid disease Mother     Hyperlipidemia Mother     Kidney disease Mother     Heart disease Maternal Uncle     Heart disease Paternal Uncle     Anemia Other         cousins     Social History     Tobacco Use    Smoking status: Former     Current packs/day: 0.00     Average packs/day: 1 pack/day for 42.0 years (42.0 ttl pk-yrs)     Types: Cigarettes     Start date:      Quit date: 2012     Years since quittin.3     Passive exposure: Past    Smokeless tobacco: Never   Vaping Use    Vaping status: Never Used   Substance Use Topics    Alcohol use: Yes     Alcohol/week: 4.0 standard drinks of alcohol     Types: 4 Cans of beer per week     Comment: 4 per day    Drug use: No     Social History     Social History Narrative        Smoked up through     Smoked up to 3 packs of cigarettes per day    Drinks 35 beers on a weekly basis    Has not worked around asbestos     FAMILY AND SOCIAL HISTORY REVIEWED.    Review of Systems  IF PRESENT REFER TO SCANNED ROS SHEET FROM SAME DATE  OTHERWISE  "ROS OBTAINED AND NON-CONTRIBUTORY OVER HPI.    /76 (BP Location: Left arm, Patient Position: Sitting, Cuff Size: Adult)   Pulse 56   Temp 98.4 °F (36.9 °C)   Resp 16   Ht 180.3 cm (70.98\")   Wt 88.2 kg (194 lb 6 oz)   SpO2 100% Comment: room air at resting  BMI 27.12 kg/m²   Physical Exam  Vitals and nursing note reviewed.   Constitutional:       General: He is not in acute distress.     Appearance: He is well-developed. He is not diaphoretic.   HENT:      Head: Normocephalic and atraumatic.   Neck:      Thyroid: No thyromegaly.   Cardiovascular:      Rate and Rhythm: Normal rate. Rhythm irregular.      Heart sounds: Normal heart sounds. No murmur heard.  Pulmonary:      Effort: Pulmonary effort is normal.      Breath sounds: Normal breath sounds. No stridor.   Musculoskeletal:         General: Normal range of motion.   Lymphadenopathy:      Cervical: No cervical adenopathy.      Upper Body:      Right upper body: No supraclavicular or epitrochlear adenopathy.      Left upper body: No supraclavicular or epitrochlear adenopathy.   Skin:     General: Skin is warm and dry.   Neurological:      Mental Status: He is alert.   Psychiatric:         Behavior: Behavior normal.         Results     CT scan of the chest just done yesterday reviewed on PACS  Probably no significant change and no new findings on chest imaging    Immunization History   Administered Date(s) Administered    COVID-19 (PFIZER) Purple Cap Monovalent 03/17/2021, 04/07/2021     Problem List       ICD-10-CM ICD-9-CM   1. Pulmonary nodule (LLL pleural based)  R91.1 793.11   2. Stage IV (Very severe) COPD  J44.9 496   3. Former smoker (Stopped 2012)  Z87.891 V15.82   4. JONES (dyspnea on exertion)  R06.09 786.09       Discussion     We reviewed his chest imaging which is stable  At this point I would continue to do annual LDCT at least for the next couple years given his smoking history    It is good to remain on the same medications  I will " refill his Trelegy with albuterol    I will plan to see him back in a year or earlier if there are any problems in the meantime    This visit represents an established relationship with whom the provider is providing ongoing longitudinal care related to serious and/or complex conditions    Moderate level of Medical Decision Making complexity based on 2 or more chronic stable illnesses and an independent review of test results and/or prescription drug management.    Ash Mancilla MD  Note electronically signed    CC: Anali Weinberg, RIA

## 2025-05-21 DIAGNOSIS — R91.1 PULMONARY NODULE: Primary | ICD-10-CM

## 2025-05-29 PROBLEM — R29.6 FALLS: Status: ACTIVE | Noted: 2025-05-13

## 2025-06-05 ENCOUNTER — HOSPITAL ENCOUNTER (OUTPATIENT)
Dept: CT IMAGING | Facility: HOSPITAL | Age: 70
Discharge: HOME | End: 2025-06-05
Admitting: INTERNAL MEDICINE
Payer: MEDICARE

## 2025-06-05 DIAGNOSIS — R91.1 PULMONARY NODULE: ICD-10-CM

## 2025-06-05 PROCEDURE — 71271 CT THORAX LUNG CANCER SCR C-: CPT

## 2025-06-09 DIAGNOSIS — Z87.891 FORMER SMOKER: ICD-10-CM

## 2025-06-09 DIAGNOSIS — R91.1 PULMONARY NODULE: Primary | ICD-10-CM

## 2025-06-09 DIAGNOSIS — J44.9 COPD MIXED TYPE: ICD-10-CM

## 2025-07-14 ENCOUNTER — PREP FOR SURGERY (OUTPATIENT)
Dept: OTHER | Facility: HOSPITAL | Age: 70
End: 2025-07-14
Payer: MEDICARE

## 2025-07-14 DIAGNOSIS — I48.19 PERSISTENT ATRIAL FIBRILLATION: Primary | ICD-10-CM

## 2025-07-14 DIAGNOSIS — R29.6 FALLS: ICD-10-CM

## 2025-07-14 RX ORDER — NITROGLYCERIN 0.4 MG/1
0.4 TABLET SUBLINGUAL
OUTPATIENT
Start: 2025-07-14

## 2025-07-14 RX ORDER — ONDANSETRON 2 MG/ML
4 INJECTION INTRAMUSCULAR; INTRAVENOUS EVERY 6 HOURS PRN
OUTPATIENT
Start: 2025-07-14

## 2025-07-14 RX ORDER — CEFAZOLIN SODIUM 2 G/100ML
2000 INJECTION, SOLUTION INTRAVENOUS ONCE
OUTPATIENT
Start: 2025-07-14 | End: 2025-07-14

## 2025-07-14 RX ORDER — SODIUM CHLORIDE 0.9 % (FLUSH) 0.9 %
10 SYRINGE (ML) INJECTION AS NEEDED
OUTPATIENT
Start: 2025-07-14

## 2025-07-14 RX ORDER — SODIUM CHLORIDE 0.9 % (FLUSH) 0.9 %
10 SYRINGE (ML) INJECTION EVERY 12 HOURS SCHEDULED
OUTPATIENT
Start: 2025-07-14

## 2025-07-14 RX ORDER — ACETAMINOPHEN 325 MG/1
650 TABLET ORAL EVERY 4 HOURS PRN
OUTPATIENT
Start: 2025-07-14

## 2025-07-14 RX ORDER — SODIUM CHLORIDE 9 MG/ML
40 INJECTION, SOLUTION INTRAVENOUS AS NEEDED
OUTPATIENT
Start: 2025-07-14

## 2025-07-15 ENCOUNTER — PRE-ADMISSION TESTING (OUTPATIENT)
Dept: PREADMISSION TESTING | Facility: HOSPITAL | Age: 70
End: 2025-07-15
Payer: MEDICARE

## 2025-07-15 DIAGNOSIS — R29.6 FALLS: ICD-10-CM

## 2025-07-15 DIAGNOSIS — I48.19 PERSISTENT ATRIAL FIBRILLATION: ICD-10-CM

## 2025-07-15 LAB
ANION GAP SERPL CALCULATED.3IONS-SCNC: 9 MMOL/L (ref 5–15)
BUN SERPL-MCNC: 14.2 MG/DL (ref 8–23)
BUN/CREAT SERPL: 14.5 (ref 7–25)
CALCIUM SPEC-SCNC: 8.9 MG/DL (ref 8.6–10.5)
CHLORIDE SERPL-SCNC: 99 MMOL/L (ref 98–107)
CO2 SERPL-SCNC: 26 MMOL/L (ref 22–29)
CREAT SERPL-MCNC: 0.98 MG/DL (ref 0.76–1.27)
DEPRECATED RDW RBC AUTO: 46.5 FL (ref 37–54)
EGFRCR SERPLBLD CKD-EPI 2021: 84.5 ML/MIN/1.73
ERYTHROCYTE [DISTWIDTH] IN BLOOD BY AUTOMATED COUNT: 12.9 % (ref 12.3–15.4)
GLUCOSE SERPL-MCNC: 95 MG/DL (ref 65–99)
HCT VFR BLD AUTO: 34.9 % (ref 37.5–51)
HGB BLD-MCNC: 11.3 G/DL (ref 13–17.7)
MCH RBC QN AUTO: 31.8 PG (ref 26.6–33)
MCHC RBC AUTO-ENTMCNC: 32.4 G/DL (ref 31.5–35.7)
MCV RBC AUTO: 98.3 FL (ref 79–97)
PLATELET # BLD AUTO: 222 10*3/MM3 (ref 140–450)
PMV BLD AUTO: 9.2 FL (ref 6–12)
POTASSIUM SERPL-SCNC: 4.9 MMOL/L (ref 3.5–5.2)
RBC # BLD AUTO: 3.55 10*6/MM3 (ref 4.14–5.8)
SODIUM SERPL-SCNC: 134 MMOL/L (ref 136–145)
WBC NRBC COR # BLD AUTO: 7.57 10*3/MM3 (ref 3.4–10.8)

## 2025-07-15 PROCEDURE — 36415 COLL VENOUS BLD VENIPUNCTURE: CPT

## 2025-07-15 PROCEDURE — 80048 BASIC METABOLIC PNL TOTAL CA: CPT

## 2025-07-15 PROCEDURE — 85027 COMPLETE CBC AUTOMATED: CPT

## 2025-07-15 NOTE — DISCHARGE INSTRUCTIONS
Dear Patient,    Drink plenty of fluids the day before your procedure to ensure you are well hydrated, unless otherwise directed by your physician.    Do NOT eat after midnight the night before your procedure.     Take your medications as instructed by your doctor.    Following your procedure, be sure to drink plenty of fluids to continue flushing the kidneys if dye was utilized during your procedure (cardiac catheterization)    Benefits of hydrating after your procedure include:    -Improved hydration helps prevent potential harm to the kidneys by flushing the contrast/dye used during your procedure (if applicable)    -Lower post-procedure complications    -Improved patient comfort     Do NOT smoke after midnight the night before your procedure.    Glasses and jewelry may be worn, but dentures must be removed prior to your procedure.    Leave any items you consider valuable at home.      MORNING of your Procedure, please bring the following:     -Photo ID and insurance card(s)    -ALL medications in their ORIGINAL CONTAINERS or accurate medication list.    -Co-pay and/or deductible required by your insurance   -Copy of living will or power of  document (if not brought to Pre-Admission Testing department)   -CPAP mask and tubing, not your machine (if applicable)   -Relaxation aids (music, books, magazines)    Family members may wait in [facility] waiting area during procedure.    Please make arrangements for transportation home prior to discharge.

## 2025-07-16 NOTE — NURSING NOTE
PRE-LAAO HISTORY  Jason Lane 1955   [address] [place] [address] [address] [place] [address]   [phone] (home)     Referring MD: Domenica Pereira APRN   Information obtained from: [x] Medical record review  [x] Patient report  Scheduled for: LAAO  implant on July 23, 2025 with Dr Dr. Horvath  SDM Visit: Domenica Pereira APRN       CHADS-VASc Risk Assessment               3 Total Score    1 CHF    1 Hypertension    1 Age 65-74    Criteria that do not apply:    Age >/= 75    DM    PRIOR STROKE/TIA/THROMBO    Vascular Disease    Sex: Female             History & Risk Factors (prior to LAAO implant)  [id number] Risk Factors:  Congestive HF     [] No   [x] Yes  LV Dysfunction   [] No   [x] Yes  Hypertension      [] No   [x] Yes  Diabetes    [x] No   [] Yes  Stroke       [x] No   [] Yes  TIA       [x] No   [] Yes  Thromboembolism    [x] No   [] Yes  Vascular Disease   [x] No   [] Yes       If Yes, Type   [] Prior MI  [] PAD      [] Aortic Plaque    Statin if indicated          [] No   [x] Yes  HAS-BLED Risk Factors:  HTN (uncontrolled) [] No  [x] Yes  Abnormal Renal Fxn  [x] No  [] Yes  Abnormal Liver Fxn   [x] No  [] Yes  Stroke            [x] No  [] Yes  Bleeding event [x] No  [] Yes  Labile INR      [x] No  [] Yes  Alcohol  [] No  [x] Yes- nightly  Antiplatelets      [x] No  [] Yes  NSAIDS  [] No  [x] Yes- PRN    Indication: Falls    Additional Stroke & Bleeding Risk Factors:  Increased Fall Risk   [] No  [x] Yes  Bleeding Event         [x] No  [] Yes      If Yes, Type      [] Intracranial [] Epistaxis   [] GI   [] Other    Rhythm History:  AFIBTYPE: persistent    Valvular AFib        [x] No  [] Yes       If Yes, Rheum Valve Disease []  No  [] Yes       If Yes, Mitral Valve Replacement [] No  [] Yes            If Yes, Mechanical?   [] No  [] Yes       If Yes, Mitral Valve Repair  [] No  [] Yes    Attempt at AFib Termination:  [] No  [x] Yes       If Yes, pharmacologic CV    [] No  [x] Yes       If Yes, DC  cardioversion  [] No  [x] Yes       If Yes, catheter ablation  [] No  [x] Yes            If Yes, Date of most recent: 1/21/2025                  Atrial Flutter    [x] No  [] Yes       If Yes, attempt at termination  [] No  [] Yes            If Yes, pharmacologic cardioversion [] No  [] Yes            If Yes, DC cardioversion  [] No  [] Yes            If Yes, catheter ablation  [] No  [] Yes            If Yes, Date of most recent: ________           QUEENIE Intervention    [x] No  [] Yes    Additional History & Risk Factors:  Cardiomyopathy   [x] No  [] Yes  Chronic Lung Disease  [] No  [x] Yes- COPD  Coronary Artery Disease  [x] No  [] Yes  Sleep Apnea    [x] No  [] Yes       If Yes, compliant with treatment [] No  [] Yes    Epicardial Approach Considered [x] No  [] Yes    Diagnostic Studies:  Last Echo:  [x] TTE Date: 2/1/2024                 EF: 55 %             LA: 3.9 cm             VHD: Trace to mild MR, Trace TR       Ace, arb arni for EF < 40%       Pre-procedure blood thinner medications:  Pt will hold Eliquis the morning of surgery and start a daily 81 mg ASA.  Discussed pre procedure workup, procedure, recovery and follow up appointments along with medication options.            07/16/25 12:53 EDT   GARRY Revised 1/25/2024

## 2025-07-23 ENCOUNTER — ANESTHESIA EVENT (OUTPATIENT)
Dept: CARDIOLOGY | Facility: HOSPITAL | Age: 70
End: 2025-07-23
Payer: MEDICARE

## 2025-07-23 ENCOUNTER — READMISSION MANAGEMENT (OUTPATIENT)
Dept: CALL CENTER | Facility: HOSPITAL | Age: 70
End: 2025-07-23
Payer: MEDICARE

## 2025-07-23 ENCOUNTER — HOSPITAL ENCOUNTER (INPATIENT)
Facility: HOSPITAL | Age: 70
LOS: 1 days | Discharge: HOME | End: 2025-07-23
Attending: INTERNAL MEDICINE | Admitting: INTERNAL MEDICINE
Payer: MEDICARE

## 2025-07-23 ENCOUNTER — APPOINTMENT (OUTPATIENT)
Dept: CARDIOLOGY | Facility: HOSPITAL | Age: 70
End: 2025-07-23
Payer: MEDICARE

## 2025-07-23 ENCOUNTER — ANESTHESIA (OUTPATIENT)
Dept: CARDIOLOGY | Facility: HOSPITAL | Age: 70
End: 2025-07-23
Payer: MEDICARE

## 2025-07-23 VITALS
OXYGEN SATURATION: 99 % | SYSTOLIC BLOOD PRESSURE: 139 MMHG | HEIGHT: 71 IN | TEMPERATURE: 98.5 F | WEIGHT: 194 LBS | BODY MASS INDEX: 27.16 KG/M2 | DIASTOLIC BLOOD PRESSURE: 72 MMHG | HEART RATE: 58 BPM | RESPIRATION RATE: 13 BRPM

## 2025-07-23 DIAGNOSIS — I48.19 PERSISTENT ATRIAL FIBRILLATION: ICD-10-CM

## 2025-07-23 DIAGNOSIS — Z95.818 PRESENCE OF AMULET LEFT ATRIAL APPENDAGE CLOSURE DEVICE: Primary | ICD-10-CM

## 2025-07-23 DIAGNOSIS — R29.6 FALLS: ICD-10-CM

## 2025-07-23 PROBLEM — I48.0 AF (PAROXYSMAL ATRIAL FIBRILLATION): Status: ACTIVE | Noted: 2025-07-23

## 2025-07-23 LAB — BH CV ECHO MEAS - AO ROOT DIAM: 3.6 CM

## 2025-07-23 PROCEDURE — C1889 IMPLANT/INSERT DEVICE, NOC: HCPCS | Performed by: INTERNAL MEDICINE

## 2025-07-23 PROCEDURE — 25010000002 LIDOCAINE 1 % SOLUTION

## 2025-07-23 PROCEDURE — 25010000002 HEPARIN (PORCINE) PER 1000 UNITS: Performed by: INTERNAL MEDICINE

## 2025-07-23 PROCEDURE — 25010000002 DEXAMETHASONE PER 1 MG

## 2025-07-23 PROCEDURE — 85347 COAGULATION TIME ACTIVATED: CPT

## 2025-07-23 PROCEDURE — 25010000002 SUGAMMADEX 200 MG/2ML SOLUTION

## 2025-07-23 PROCEDURE — 93662 INTRACARDIAC ECG (ICE): CPT | Performed by: INTERNAL MEDICINE

## 2025-07-23 PROCEDURE — 25810000003 LACTATED RINGERS PER 1000 ML

## 2025-07-23 PROCEDURE — 25010000002 PROTAMINE SULFATE PER 10 MG: Performed by: INTERNAL MEDICINE

## 2025-07-23 PROCEDURE — C1769 GUIDE WIRE: HCPCS | Performed by: INTERNAL MEDICINE

## 2025-07-23 PROCEDURE — 93355 ECHO TRANSESOPHAGEAL (TEE): CPT

## 2025-07-23 PROCEDURE — 25010000002 BUPIVACAINE 0.5 % SOLUTION: Performed by: INTERNAL MEDICINE

## 2025-07-23 PROCEDURE — 25010000002 LIDOCAINE SOLUTION: Performed by: INTERNAL MEDICINE

## 2025-07-23 PROCEDURE — C1760 CLOSURE DEV, VASC: HCPCS | Performed by: INTERNAL MEDICINE

## 2025-07-23 PROCEDURE — C1894 INTRO/SHEATH, NON-LASER: HCPCS | Performed by: INTERNAL MEDICINE

## 2025-07-23 PROCEDURE — C1893 INTRO/SHEATH, FIXED,NON-PEEL: HCPCS | Performed by: INTERNAL MEDICINE

## 2025-07-23 PROCEDURE — 93355 ECHO TRANSESOPHAGEAL (TEE): CPT | Performed by: INTERNAL MEDICINE

## 2025-07-23 PROCEDURE — 02L73DK OCCLUSION OF LEFT ATRIAL APPENDAGE WITH INTRALUMINAL DEVICE, PERCUTANEOUS APPROACH: ICD-10-PCS | Performed by: INTERNAL MEDICINE

## 2025-07-23 PROCEDURE — 33340 PERQ CLSR TCAT L ATR APNDGE: CPT | Performed by: INTERNAL MEDICINE

## 2025-07-23 PROCEDURE — 25010000002 ONDANSETRON PER 1 MG

## 2025-07-23 PROCEDURE — C1759 CATH, INTRA ECHOCARDIOGRAPHY: HCPCS | Performed by: INTERNAL MEDICINE

## 2025-07-23 PROCEDURE — B24BZZZ ULTRASONOGRAPHY OF HEART WITH AORTA: ICD-10-PCS | Performed by: INTERNAL MEDICINE

## 2025-07-23 PROCEDURE — 25010000002 CEFAZOLIN PER 500 MG

## 2025-07-23 PROCEDURE — C1892 INTRO/SHEATH,FIXED,PEEL-AWAY: HCPCS | Performed by: INTERNAL MEDICINE

## 2025-07-23 PROCEDURE — 25010000002 PROPOFOL 10 MG/ML EMULSION

## 2025-07-23 PROCEDURE — 25510000001 IOPAMIDOL PER 1 ML: Performed by: INTERNAL MEDICINE

## 2025-07-23 DEVICE — KT OCCL ATRIAL APPENDAGE AMPLATZER AMULET 22X28X7.5MM: Type: IMPLANTABLE DEVICE | Site: HEART | Status: FUNCTIONAL

## 2025-07-23 RX ORDER — SODIUM CHLORIDE 0.9 % (FLUSH) 0.9 %
10 SYRINGE (ML) INJECTION AS NEEDED
Status: DISCONTINUED | OUTPATIENT
Start: 2025-07-23 | End: 2025-07-23 | Stop reason: HOSPADM

## 2025-07-23 RX ORDER — ONDANSETRON 2 MG/ML
INJECTION INTRAMUSCULAR; INTRAVENOUS AS NEEDED
Status: DISCONTINUED | OUTPATIENT
Start: 2025-07-23 | End: 2025-07-23 | Stop reason: SURG

## 2025-07-23 RX ORDER — ONDANSETRON 2 MG/ML
4 INJECTION INTRAMUSCULAR; INTRAVENOUS EVERY 6 HOURS PRN
Status: DISCONTINUED | OUTPATIENT
Start: 2025-07-23 | End: 2025-07-23 | Stop reason: HOSPADM

## 2025-07-23 RX ORDER — FAMOTIDINE 10 MG/ML
20 INJECTION, SOLUTION INTRAVENOUS ONCE
Status: DISCONTINUED | OUTPATIENT
Start: 2025-07-23 | End: 2025-07-23 | Stop reason: HOSPADM

## 2025-07-23 RX ORDER — LIDOCAINE HYDROCHLORIDE 10 MG/ML
0.5 INJECTION, SOLUTION EPIDURAL; INFILTRATION; INTRACAUDAL; PERINEURAL ONCE AS NEEDED
Status: DISCONTINUED | OUTPATIENT
Start: 2025-07-23 | End: 2025-07-23 | Stop reason: HOSPADM

## 2025-07-23 RX ORDER — CEFAZOLIN SODIUM 2 G/100ML
2000 INJECTION, SOLUTION INTRAVENOUS ONCE
Status: DISCONTINUED | OUTPATIENT
Start: 2025-07-23 | End: 2025-07-23

## 2025-07-23 RX ORDER — NITROGLYCERIN 0.4 MG/1
0.4 TABLET SUBLINGUAL
Status: DISCONTINUED | OUTPATIENT
Start: 2025-07-23 | End: 2025-07-23 | Stop reason: HOSPADM

## 2025-07-23 RX ORDER — SODIUM CHLORIDE 0.9 % (FLUSH) 0.9 %
10 SYRINGE (ML) INJECTION EVERY 12 HOURS SCHEDULED
Status: DISCONTINUED | OUTPATIENT
Start: 2025-07-23 | End: 2025-07-23 | Stop reason: HOSPADM

## 2025-07-23 RX ORDER — DEXAMETHASONE SODIUM PHOSPHATE 4 MG/ML
INJECTION, SOLUTION INTRA-ARTICULAR; INTRALESIONAL; INTRAMUSCULAR; INTRAVENOUS; SOFT TISSUE AS NEEDED
Status: DISCONTINUED | OUTPATIENT
Start: 2025-07-23 | End: 2025-07-23 | Stop reason: SURG

## 2025-07-23 RX ORDER — ACETAMINOPHEN 325 MG/1
650 TABLET ORAL EVERY 4 HOURS PRN
Status: DISCONTINUED | OUTPATIENT
Start: 2025-07-23 | End: 2025-07-23 | Stop reason: HOSPADM

## 2025-07-23 RX ORDER — MIDAZOLAM HYDROCHLORIDE 1 MG/ML
0.5 INJECTION, SOLUTION INTRAMUSCULAR; INTRAVENOUS
Status: DISCONTINUED | OUTPATIENT
Start: 2025-07-23 | End: 2025-07-23 | Stop reason: HOSPADM

## 2025-07-23 RX ORDER — CLOPIDOGREL BISULFATE 75 MG/1
75 TABLET ORAL DAILY
Qty: 90 TABLET | Refills: 3 | Status: SHIPPED | OUTPATIENT
Start: 2025-07-23

## 2025-07-23 RX ORDER — PROPOFOL 10 MG/ML
VIAL (ML) INTRAVENOUS AS NEEDED
Status: DISCONTINUED | OUTPATIENT
Start: 2025-07-23 | End: 2025-07-23 | Stop reason: SURG

## 2025-07-23 RX ORDER — HEPARIN SODIUM 1000 [USP'U]/ML
INJECTION, SOLUTION INTRAVENOUS; SUBCUTANEOUS
Status: DISCONTINUED | OUTPATIENT
Start: 2025-07-23 | End: 2025-07-23 | Stop reason: HOSPADM

## 2025-07-23 RX ORDER — EPHEDRINE SULFATE 50 MG/ML
INJECTION, SOLUTION INTRAVENOUS AS NEEDED
Status: DISCONTINUED | OUTPATIENT
Start: 2025-07-23 | End: 2025-07-23 | Stop reason: SURG

## 2025-07-23 RX ORDER — FAMOTIDINE 20 MG/1
20 TABLET, FILM COATED ORAL ONCE
Status: COMPLETED | OUTPATIENT
Start: 2025-07-23 | End: 2025-07-23

## 2025-07-23 RX ORDER — ROCURONIUM BROMIDE 10 MG/ML
INJECTION, SOLUTION INTRAVENOUS AS NEEDED
Status: DISCONTINUED | OUTPATIENT
Start: 2025-07-23 | End: 2025-07-23 | Stop reason: SURG

## 2025-07-23 RX ORDER — SODIUM CHLORIDE, SODIUM LACTATE, POTASSIUM CHLORIDE, CALCIUM CHLORIDE 600; 310; 30; 20 MG/100ML; MG/100ML; MG/100ML; MG/100ML
INJECTION, SOLUTION INTRAVENOUS CONTINUOUS PRN
Status: DISCONTINUED | OUTPATIENT
Start: 2025-07-23 | End: 2025-07-23 | Stop reason: SURG

## 2025-07-23 RX ORDER — ASPIRIN 81 MG/1
81 TABLET ORAL DAILY
Qty: 90 TABLET | Refills: 3 | Status: SHIPPED | OUTPATIENT
Start: 2025-07-23

## 2025-07-23 RX ORDER — BUPIVACAINE HYDROCHLORIDE 5 MG/ML
INJECTION, SOLUTION PERINEURAL
Status: DISCONTINUED | OUTPATIENT
Start: 2025-07-23 | End: 2025-07-23 | Stop reason: HOSPADM

## 2025-07-23 RX ORDER — SODIUM CHLORIDE, SODIUM LACTATE, POTASSIUM CHLORIDE, CALCIUM CHLORIDE 600; 310; 30; 20 MG/100ML; MG/100ML; MG/100ML; MG/100ML
9 INJECTION, SOLUTION INTRAVENOUS CONTINUOUS
Status: DISCONTINUED | OUTPATIENT
Start: 2025-07-24 | End: 2025-07-23 | Stop reason: HOSPADM

## 2025-07-23 RX ORDER — IOPAMIDOL 755 MG/ML
INJECTION, SOLUTION INTRAVASCULAR
Status: DISCONTINUED | OUTPATIENT
Start: 2025-07-23 | End: 2025-07-23 | Stop reason: HOSPADM

## 2025-07-23 RX ORDER — LIDOCAINE HYDROCHLORIDE 5 MG/ML
INJECTION, SOLUTION INFILTRATION; PERINEURAL
Status: DISCONTINUED | OUTPATIENT
Start: 2025-07-23 | End: 2025-07-23 | Stop reason: HOSPADM

## 2025-07-23 RX ORDER — LIDOCAINE HYDROCHLORIDE 10 MG/ML
INJECTION, SOLUTION INFILTRATION; PERINEURAL AS NEEDED
Status: DISCONTINUED | OUTPATIENT
Start: 2025-07-23 | End: 2025-07-23 | Stop reason: SURG

## 2025-07-23 RX ORDER — SODIUM CHLORIDE 9 MG/ML
40 INJECTION, SOLUTION INTRAVENOUS AS NEEDED
Status: DISCONTINUED | OUTPATIENT
Start: 2025-07-23 | End: 2025-07-23 | Stop reason: HOSPADM

## 2025-07-23 RX ORDER — PROTAMINE SULFATE 10 MG/ML
INJECTION, SOLUTION INTRAVENOUS
Status: DISCONTINUED | OUTPATIENT
Start: 2025-07-23 | End: 2025-07-23 | Stop reason: HOSPADM

## 2025-07-23 RX ADMIN — DEXAMETHASONE SODIUM PHOSPHATE 4 MG: 4 INJECTION, SOLUTION INTRAMUSCULAR; INTRAVENOUS at 08:34

## 2025-07-23 RX ADMIN — PROPOFOL 150 MG: 10 INJECTION, EMULSION INTRAVENOUS at 07:43

## 2025-07-23 RX ADMIN — SODIUM CHLORIDE 2000 MG: 900 INJECTION INTRAVENOUS at 07:49

## 2025-07-23 RX ADMIN — LIDOCAINE HYDROCHLORIDE 50 MG: 10 INJECTION, SOLUTION INFILTRATION; PERINEURAL at 07:43

## 2025-07-23 RX ADMIN — EPHEDRINE SULFATE 10 MG: 50 INJECTION INTRAVENOUS at 08:07

## 2025-07-23 RX ADMIN — EPHEDRINE SULFATE 5 MG: 50 INJECTION INTRAVENOUS at 08:10

## 2025-07-23 RX ADMIN — FAMOTIDINE 20 MG: 20 TABLET, FILM COATED ORAL at 07:16

## 2025-07-23 RX ADMIN — SUGAMMADEX 400 MG: 100 INJECTION, SOLUTION INTRAVENOUS at 08:34

## 2025-07-23 RX ADMIN — ROCURONIUM BROMIDE 100 MG: 10 INJECTION INTRAVENOUS at 07:43

## 2025-07-23 RX ADMIN — ONDANSETRON 4 MG: 2 INJECTION INTRAMUSCULAR; INTRAVENOUS at 08:34

## 2025-07-23 RX ADMIN — SODIUM CHLORIDE, POTASSIUM CHLORIDE, SODIUM LACTATE AND CALCIUM CHLORIDE: 600; 310; 30; 20 INJECTION, SOLUTION INTRAVENOUS at 07:33

## 2025-07-23 NOTE — Clinical Note
LAAO Device Released [facility]  Infectious Disease  Progress Note    Patient Name: Geovani [place]  MRN: [id number]  Admission Date: 11/10/2023  Length of Stay: 2 days  Attending Physician: Lara Ribera MD  Primary Care Provider: Faizan Antoine MD    Isolation Status: No active isolations  Assessment/Plan:      Renal/  * Nephrostomy tube displaced  IR consulted and replaced nephrostomy on 11/11.     UTI (urinary tract infection)  70 yo M with history of SCC dx by RUL nodule biopsy 10/18/23, also with satellite lesion seen on CT Chest with size increase to 7mm 9/2023 and muscle invasive bladder cancer s/p TMT in 2020 now with recurrence in the RP nodes and possible metastasis to the lungs vs primary lung CA who presented with displaced nephrostomy. Replaced 11/11 by IR. Then spiked fever to 101.1 F with slight increase in WBC count. U/A with 76 WBC. Urine culture growing E faecium likely translocated from gut. CT a/p reports Right-sided external drainage catheter extending to the subcutaneous region superiorly and extending to the distal right ureter. Double-J left-sided urinary stent extending from the left renal pelvis to the left bladder. Bladder wall thickening.    Patient afebrile since 11/11. Leukocytosis to 16K on review of CBC today. Urine culture has grown E faecium not being covered by ertapenem. Will treat with Zyvox.    Recommendations:  --No coverage expected for E faecium with ertapenem   --Recommend switching to PO linezolid 600 mg BID for anticipated 14 day course   --Stop date for linezolid will be 11/27/23   --Monitor fever curve and blood cx (NGTD)  --Recommend PICC removal today   --Will schedule for ID clinic follow up   --Above d/w primary team.        Thank you for your consult. I will follow-up with patient. Please contact us if you have any additional questions.    Jeremiah Hartman, DO  Infectious Disease  [facility]    Subjective:     Principal Problem:Nephrostomy tube  displaced    HPI: This is a 71 y.o. male with pertinent PMHx of bladder and lung cancer who has been seen by ID outpatient as hospital follow up after being brought to [facility] on 10/20 with altered mental status. Patient [place] from the airport with worsening lethargy and shallow respiration. He was noted to be saturating around 90% on room air and was placed on 2 L. Patient just moved from [place] to [place] to be closer to family for cancer treatments. Family is concerned that over the past few days he had become increasingly weak. In the ER, arrival vitals were significant for hypoxia, patient was placed on 5 L. CBC showed normocytic anemia. CMP was significant for low albumin, elevated sugars. TSH , lactic acid, troponin within normal range. U/A was concerning for pyuria >100. Urine culture grew Enterobacter cloacae >100,000 cfu/mL. CT head with moderate area of hypoattenuation in the parietal cortex suggesting infarct although radiologist favors chronic process. Small probable remote areas of lacunar infarct involving the left caudate, bilateral thalamus and right lateral basal ganglia. CTA head and neck with no evidence of acute intracranial abnormality. No high-grade stenosis or major vessel occlusion. Spiculated lesion noticed in the right posterior lung apex. Chest x-ray showed no acute or significant focal chest abnormality. MRI brain with no acute stroke. Infectious Diseases consulted. Recommend stopping cefepime patient had been on due to risk for neurotoxicity. Switched to ertapenem 1 g daily for total of 14 days (stop date 11/4). Per daughter, patient had received 10 days of oral antibiotics while in [place] and nephrostomy tube was infected. They are waiting for referral to heme/onc for 2 months of chemo. Still establishing care since moving to [place]. Will be in rehab for 2 weeks. Seeing urologist 11/2. Stent in left kidney 3 months ago for stones and kidney failed. 9/18 both kidneys  failed so stent placed and nephrostomy tube on right. Only dribbles from penis. All urine from nephrostomy tube. High grade bladder cancer, lung cancer, and lymph node involvement.     Patient now admitted for accidentally removing nephrostomy tube at home. Per IR note, indwelling right dislodged PCNU was removed and replaced. Prior to discharge patient spiked fever to 101.1 F. Very mild leukocytosis noted. UA with 76 WBC. Urine culture in process. ID consulted for UTI. On ertapenem.     Interval History: Patient afebrile since 11/11. Leukocytosis to 16K. Has been on ertapenem. However, urine isolate E faecium. Not covered by ertapenem. Will switch to Zyvox and trend white count.     Review of Systems   Constitutional:  Positive for activity change. Negative for fever.   Gastrointestinal:  Negative for diarrhea.   Genitourinary:  Positive for difficulty urinating. Negative for dysuria.   Musculoskeletal:  Positive for arthralgias. Negative for back pain.   All other systems reviewed and are negative.    Objective:     Vital Signs (Most Recent):  Temp: 98.7 °F (37.1 °C) (11/13/23 0715)  Pulse: 91 (11/13/23 0715)  Resp: 16 (11/13/23 0715)  BP: (!) 90/53 (11/13/23 0715)  SpO2: 96 % (11/13/23 0715) Vital Signs (24h Range):  Temp:  [98.6 °F (37 °C)-99.5 °F (37.5 °C)] 98.7 °F (37.1 °C)  Pulse:  [76-95] 91  Resp:  [16-20] 16  SpO2:  [92 %-97 %] 96 %  BP: ()/(52-65) 90/53     Weight: 72.5 kg (159 lb 13.3 oz)  Body mass index is 23.6 kg/m².    Estimated Creatinine Clearance: 49.8 mL/min (based on SCr of 1.4 mg/dL).     Physical Exam  Constitutional:       General: He is not in acute distress.     Appearance: Normal appearance. He is not ill-appearing.   Cardiovascular:      Rate and Rhythm: Normal rate and regular rhythm.      Pulses: Normal pulses.      Heart sounds: Normal heart sounds. No murmur heard.     No friction rub. No gallop.   Pulmonary:      Effort: Pulmonary effort is normal. No respiratory distress.       Breath sounds: Normal breath sounds.   Abdominal:      General: Abdomen is flat. Bowel sounds are normal. There is no distension.      Palpations: Abdomen is soft.      Tenderness: There is no abdominal tenderness.   Genitourinary:     Comments: Left sided nephrostomy intact   Skin:     General: Skin is warm and dry.   Neurological:      Mental Status: He is alert.          Significant Labs: CBC:   Recent Labs   Lab 11/12/23 0441 11/13/23 0325   WBC 12.69 16.50*   HGB 9.5* 8.2*   HCT 30.4* 26.2*    212     CMP:   Recent Labs   Lab 11/12/23 0441 11/13/23 0325    137   K 4.0 3.7    103   CO2 22* 23   * 132*   BUN 17 21   CREATININE 1.4 1.4   CALCIUM 8.7 8.7   PROT 6.2 6.2   ALBUMIN 2.6* 2.4*   BILITOT 0.5 0.3   ALKPHOS 95 122   AST 8* 14   ALT 8* 9*   ANIONGAP 11 11     Microbiology Results (last 7 days)       Procedure Component Value Units Date/Time    Blood culture #1 **CANNOT BE ORDERED STAT** [[id number]] Collected: 11/10/23 1859    Order Status: Completed Specimen: Blood from Peripheral, Forearm, Right Updated: 11/13/23 0612     Blood Culture, Routine No Growth to date      No Growth to date      No Growth to date    Blood culture #2 **CANNOT BE ORDERED STAT** [[id number]] Collected: 11/10/23 1859    Order Status: Completed Specimen: Blood from Peripheral, Antecubital, Right Updated: 11/13/23 0612     Blood Culture, Routine No Growth to date      No Growth to date      No Growth to date    Urine culture [[id number]]  (Abnormal) Collected: 11/11/23 0431    Order Status: Completed Specimen: Urine Updated: 11/12/23 1315     Urine Culture, Routine ENTEROCOCCUS FAECIUM  >100,000 cfu/ml  Susceptibility pending  No other significant isolate      Narrative:      Specimen Source->Urine          All pertinent labs within the past 24 hours have been reviewed.    Significant Imaging: I have reviewed all pertinent imaging results/findings within the past 24 hours.

## 2025-07-23 NOTE — ANESTHESIA PREPROCEDURE EVALUATION
Anesthesia Evaluation     Patient summary reviewed and Nursing notes reviewed   no history of anesthetic complications:   NPO Solid Status: > 8 hours  NPO Liquid Status: > 8 hours           Airway   Mallampati: II  TM distance: >3 FB  Neck ROM: full  No difficulty expected  Dental      Pulmonary - normal exam   (+) COPD,shortness of breath  Cardiovascular - normal exam    (+) hypertension, dysrhythmias, CHF , JONES      Neuro/Psych  (+) syncope, psychiatric history  GI/Hepatic/Renal/Endo      Musculoskeletal     Abdominal    Substance History   (+) alcohol use     OB/GYN          Other                    Anesthesia Plan    ASA 3     general     intravenous induction     Anesthetic plan, risks, benefits, and alternatives have been provided, discussed and informed consent has been obtained with: patient.    Plan discussed with CRNA.    CODE STATUS:

## 2025-07-23 NOTE — OUTREACH NOTE
Prep Survey      Flowsheet Row Responses   [facility] facility patient discharged from? [facility]   Is LACE score < 7 ? Yes   Eligibility [facility]   Date of Admission 07/23/25   Date of Discharge 07/23/25   Discharge Disposition Home or Self Care   Discharge diagnosis Falls   Does the patient have one of the following disease processes/diagnoses(primary or secondary)? Other   Does the patient have Home health ordered? No   Is there a DME ordered? No   Prep survey completed? Yes            MAX SHAHID - Registered Nurse

## 2025-07-23 NOTE — NURSING NOTE
LAAO Procedure Information   Jason Lane 1955   [address] [place] [address] [phone] (home)       QUEENIE Orifice Maximal Width: 19  mm  Cumulative Air Kerma:  86 mGy  Contrast Volume:  20 mL  Dose Area Product:  699.92 ?Gy-M2

## 2025-07-23 NOTE — ANESTHESIA POSTPROCEDURE EVALUATION
Patient: Jason Lane    Procedure Summary       Date: 07/23/25 Room / Location: [facility] /  [facility]    Anesthesia Start: 0733 Anesthesia Stop:     Procedure: Atrial Appendage Occlusion, Hold Eliquis the morning of, Amulet Diagnosis:       Persistent atrial fibrillation      Falls      (Atrial Fibrillation)    Providers: Ash Horvath MD Provider: Sanjiv Levy MD    Anesthesia Type: general ASA Status: 3          118/70  HR 57  98.2  16  99%  Anesthesia Type: general    Vitals  No vitals data found for the desired time range.          Post Anesthesia Care and Evaluation    Patient location during evaluation: PACU  Patient participation: complete - patient participated  Level of consciousness: awake and alert  Pain management: adequate    Airway patency: patent  Anesthetic complications: No anesthetic complications  PONV Status: none  Cardiovascular status: hemodynamically stable and acceptable  Respiratory status: nonlabored ventilation, acceptable and nasal cannula  Hydration status: acceptable

## 2025-07-23 NOTE — Clinical Note
Hemostasis started on the right femoral vein. Perclose was used in achieving hemostasis. Closure device deployed in the vessel. Hemostasis achieved successfully. Closure device additional comment: X2

## 2025-07-23 NOTE — ANESTHESIA PROCEDURE NOTES
Airway  Reason: elective    Date/Time: 7/23/2025 7:45 AM  Airway not difficult    General Information and Staff    Patient location during procedure: OR    Indications and Patient Condition  Indications for airway management: airway protection    Preoxygenated: yes  MILS maintained throughout    Mask difficulty assessment: 0 - not attempted    Final Airway Details    Final airway type: endotracheal airway      Successful airway: ETT  Cuffed: yes   Successful intubation technique: video laryngoscopy and RSI  Adjuncts used in placement: intubating stylet  Endotracheal tube insertion site: oral  Blade: Carrion  Blade size: 3  ETT size (mm): 7.0  Cormack-Lehane Classification: grade I - full view of glottis  Placement verified by: chest auscultation and capnometry   Measured from: lips  ETT/EBT  to lips (cm): 21  Number of attempts at approach: 1  Assessment: lips, teeth, and gum same as pre-op and atraumatic intubation

## 2025-07-23 NOTE — H&P
Pre-cardiac Procedure History and Physical  [facility]      Patient:  Jason Lane  :  1955  MRN: [id number]    PCP:  Anali Weinberg PA-C  PHONE:  [phone]    DATE: 2025  ID: Jason Lane is a 69 y.o. male [place] resident    CC: atrial fibrillation    Problem List  Persistent Atrial fibrillation:  Rapid ventricular response of unknown duration, hospitalized on 2012.   Pradaxa and sotalol initiated, 2012.   Echocardiogram, 2012 with mild mitral regurgitation, ejection fraction 35% to 40%.   Echocardiogram, May 2012: Left ventricular ejection fraction of 45% to 50%, mild TR.   Echocardiogram, 12/15/2014: EF 50% to 55%. Mild-to-moderate TR.  Sotalol discontinued   AMY guided ECV with initiation of Amiodarone 2022  Amiodarone discontinued 2023   s/p EPS + PVA, RFA of left atrial roof of the left posterior wall 8/15/23 with Dr. Goff  S/p PVI & PWI 2024 with Dr. Horvath  Dilated cardiomyopathy new onset 2022 diagnosed by AMY guided cardioversion  Started on amiodarone   AMY 2022: EF 48%  Alcohol abuse.   Tobacco abuse.   Hypertension.   Acute systolic congestive heart failure secondary to atrial fibrillation with rapid ventricular response.   Syncope:  Left and right heart catheterization, Dr. Larson, 03/15/2013, with normal pressures and normal coronary arteries: EF 55%    BRIEF HPI:   Mr. Lane is a 68 y/o male with the above medical history who presents for LAAO due to high fall risk from unsteady gait and economic noncompliance d/t financial hardship.  Patient denies any known significant recurrences of atrial fibrillation or other significant changes since seeing Dr. Horvath in the office in early May.     Allergies:      No Known Allergies    MEDICATIONS:  Current Outpatient Medications   Medication Instructions    albuterol sulfate  (90 Base) MCG/ACT inhaler 2 puffs, Inhalation, Every 4 Hours PRN  "   brimonidine (ALPHAGAN) 0.2 % ophthalmic solution 1 drop, 2 Times Daily    carvedilol (COREG) 12.5 mg, Oral, 2 Times Daily    dorzolamide (TRUSOPT) 2 % ophthalmic solution 1 drop, 2 Times Daily    Eliquis 5 mg, Oral    Fluticasone-Umeclidin-Vilant (Trelegy Ellipta) 100-62.5-25 MCG/ACT inhaler 1 puff, Inhalation, Daily - RT    hydrocortisone 2.5 % lotion Apply 1 Application topically to the appropriate area as directed.    ketoconazole (NIZORAL) 2 % shampoo Topical, Weekly    latanoprost (XALATAN) 0.005 % ophthalmic solution 1 drop, Nightly    lisinopril (PRINIVIL,ZESTRIL) 20 mg, Oral, 2 Times Daily    promethazine (PHENERGAN) 25 mg, Oral, Every 6 Hours PRN    timolol (BETIMOL) 0.5 % ophthalmic solution 1 drop, Daily       Past medical & surgical history, social and family history reviewed in the electronic medical record.    ROS: Pertinent positives listed in the HPI and problem list above. All others reviewed and negative.     Physical Exam:   /88 (BP Location: Left arm)   Pulse 59   Temp 96.8 °F (36 °C) (Temporal)   Resp 14   Ht 180.3 cm (71\")   Wt 88 kg (194 lb)   SpO2 92%   BMI 27.06 kg/m²     Constitutional:    Well-appearing 69 y.o. y/o adult in no acute distress        Heart:    Regular rhythm and normal rate, no murmurs, rubs or gallops   Lungs:     Clear to auscultation bilaterally, no wheezes, rhales or rhonchi, nonlabored respirations       Extremities:   No gross deformities, no edema, clubbing, or cyanosis.    Pulses:    Neuro:  Psych:   Radial pulses palpable and equal bilaterally.  No gross focal deficits  Mood and behavior appropriate for situation       Labs and Diagnostic Data:  No radiology results for the last 7 days  Lab Results   Component Value Date    GLUCOSE 95 07/15/2025    BUN 14.2 07/15/2025    CREATININE 0.98 07/15/2025     (L) 07/15/2025    K 4.9 07/15/2025    CL 99 07/15/2025    CALCIUM 8.9 07/15/2025    PROTEINTOT 6.7 12/17/2024    ALBUMIN 4.1 12/17/2024    ALT 12 " 12/17/2024    AST 13 12/17/2024    ALKPHOS 52 12/17/2024    BILITOT 1.0 12/17/2024    GLOB 2.6 12/17/2024    AGRATIO 1.6 12/17/2024    BCR 14.5 07/15/2025    ANIONGAP 9.0 07/15/2025    EGFR 84.5 07/15/2025     Lab Results   Component Value Date    WBC 7.57 07/15/2025    HGB 11.3 (L) 07/15/2025    HCT 34.9 (L) 07/15/2025    MCV 98.3 (H) 07/15/2025     07/15/2025     Lab Results   Component Value Date    TSH 0.640 12/17/2024    C8IYSIS 8.2 08/16/2022           Tele: NSR    IMPRESSION:  Persistent atrial fibrillation  Dilated cardiomyopathy with recovered LVEF  Hypertension    PLAN:  Persistent AF  - s/p repeat PVI and PWI with Dr. Wade 12/2024  - CHADSVASC (age, HF, HTN) 3: currently on apixaban              - high bleeding risk due to his gait instability              - economical non-compliance due to financial hardship  -After extensive conversation regarding risks, benefits, or alternatives to the therapy (patient voiced understanding of risks, benefits, and alternative), patient elected to proceed with the LAAO procedure                - LAAO with Amulet      Procedure to perform: LAAO. Risks, benefits and alternatives to the procedure explained to the patient and he understands and wishes to proceed.     Electronically signed by Arpit Serrato PA-C, 07/23/25, 7:15 AM EDT.

## 2025-07-24 ENCOUNTER — TRANSITIONAL CARE MANAGEMENT TELEPHONE ENCOUNTER (OUTPATIENT)
Dept: CALL CENTER | Facility: HOSPITAL | Age: 70
End: 2025-07-24
Payer: MEDICARE

## 2025-07-24 NOTE — OUTREACH NOTE
"Call Center TCM Note      Flowsheet Row Responses   [facility] patient discharged from? [facility]   Does the patient have one of the following disease processes/diagnoses(primary or secondary)? Other   TCM attempt successful? Yes   Call start time 0946   Call end time 0949   Discharge diagnosis Falls   Meds reviewed with patient/caregiver? Yes   Is the patient having any side effects they believe may be caused by any medication additions or changes? No   Does the patient have all medications ordered at discharge? Yes   Is the patient taking all medications as directed (includes completed medication regime)? Yes   Comments Appt made for 7/30 @ 11:00 with WILLIAM Driver   Does the patient have an appointment with their PCP within 7-14 days of discharge? No   Nursing Interventions Assisted patient with making appointment per protocol   Psychosocial issues? No   Did the patient receive a copy of their discharge instructions? Yes   Nursing interventions Reviewed instructions with patient   What is the patient's perception of their health status since discharge? Improving   Is the patient/caregiver able to teach back signs and symptoms related to disease process for when to call PCP? Yes   Is the patient/caregiver able to teach back signs and symptoms related to disease process for when to call 911? Yes   Is the patient/caregiver able to teach back the hierarchy of who to call/visit for symptoms/problems? PCP, Specialist, Home health nurse, Urgent Care, ED, 911 Yes   If the patient is a current smoker, are they able to teach back resources for cessation? Not a smoker   Additional teach back comments States he is doing \"fine\".   TCM call completed? Yes   Wrap up additional comments Appt made with PCP with no other needs or questions at this time.   Call end time 0949            Anuja FLORES - Licensed Nurse    7/24/2025, 09:50 EDT        "

## 2025-07-25 LAB — ACT BLD: 383 SECONDS (ref 82–152)

## 2025-07-30 ENCOUNTER — OFFICE VISIT (OUTPATIENT)
Dept: FAMILY MEDICINE CLINIC | Facility: CLINIC | Age: 70
End: 2025-07-30
Payer: MEDICARE

## 2025-07-30 VITALS
BODY MASS INDEX: 26.61 KG/M2 | TEMPERATURE: 97.9 F | HEART RATE: 58 BPM | OXYGEN SATURATION: 97 % | SYSTOLIC BLOOD PRESSURE: 128 MMHG | WEIGHT: 190.1 LBS | DIASTOLIC BLOOD PRESSURE: 64 MMHG | HEIGHT: 71 IN

## 2025-07-30 DIAGNOSIS — I10 PRIMARY HYPERTENSION: Primary | ICD-10-CM

## 2025-07-30 NOTE — PROGRESS NOTES
Subjective   Jason Lane is a 69 y.o. male  Hospital Follow Up Visit (Follow up from Left artrial appendage occulusion device implant with Dr. Ash Horvath ( Cardiology) on 07/23, doing well, has f/u appt with cardiologist 10/23)      History of Present Illness  History of Present Illness  The patient is a 69-year-old male who presents today following a recent cardiology procedure.    He reports no significant changes in his condition since the procedure. He has not experienced any complications or bleeding issues post-procedure. His next appointment with the cardiologist is scheduled for 3 months from now. He is currently on Plavix, which he will continue for 3 months, after which he can discontinue the use of blood thinners.    MEDICATIONS  Current: Plavix  Discontinued: Eliquis    The following portions of the patient's history were reviewed and updated as appropriate: allergies, current medications, past social history and problem list    Review of Systems   Constitutional:  Positive for activity change. Negative for fatigue and unexpected weight change.   Respiratory:  Negative for cough, chest tightness and shortness of breath.    Cardiovascular:  Negative for chest pain, palpitations and leg swelling.   Gastrointestinal:  Negative for nausea.   Skin:  Negative for color change and rash.   Neurological:  Negative for dizziness, syncope, weakness and headaches.   Hematological:  Bruises/bleeds easily.       Objective     Vitals:    07/30/25 1107   BP: 128/64   Pulse: 58   Temp: 97.9 °F (36.6 °C)   SpO2: 97%       Physical Exam  Vitals and nursing note reviewed.   Constitutional:       General: He is not in acute distress.     Appearance: Normal appearance. He is well-developed. He is not ill-appearing, toxic-appearing or diaphoretic.   Neck:      Vascular: No carotid bruit or JVD.   Cardiovascular:      Rate and Rhythm: Normal rate and regular rhythm.      Pulses: Normal pulses.      Heart sounds: Normal heart  sounds. No murmur heard.  Pulmonary:      Effort: Pulmonary effort is normal. No respiratory distress.      Breath sounds: Normal breath sounds.   Abdominal:      Palpations: Abdomen is soft.      Tenderness: There is no abdominal tenderness.   Skin:     General: Skin is warm and dry.   Neurological:      Mental Status: He is alert.       Physical Exam      Assessment & Plan   Assessment & Plan  1. Post-cardiology procedure follow-up.  His blood pressure readings are within the normal range. He reports no complications or bleeding issues from the recent Watchman device implantation. The device is functioning as intended, preventing clot formation and reducing the risk of stroke. He is currently on Plavix for 3 months to ensure the device is working properly. After this period, he will be able to discontinue blood thinners, which will reduce his risk of falls and associated complications.    Diagnoses and all orders for this visit:    1. Primary hypertension (Primary)     I spent 20 minutes in patient care: Reviewing records prior to the visit, examining the patient, entering orders and documentation    Part of this note may be an electronic transcription/translation of spoken language to printed text using the Dragon Dictation System.        Patient or patient representative verbalized consent for the use of Ambient Listening during the visit with  Anali Weinberg PA-C for chart documentation. 7/30/2025  11:30 EDT

## (undated) DEVICE — ST INF PRI SMRTSTE 20DRP 2VLV 24ML 117

## (undated) DEVICE — DS AMPLATZER TORQVUE 45X45DEG 12F 80CM

## (undated) DEVICE — PULSED FIELD ABLATION CATHETER: Brand: FARAWAVE™ NAV

## (undated) DEVICE — SOL NACL 0.9PCT 1000ML

## (undated) DEVICE — Device: Brand: WEBSTER CS

## (undated) DEVICE — ADULT, W/LG. BACK PAD, RADIOTRANSPARENT ELEMENT AND LEAD WIRE COMPATIBLE W/: Brand: DEFIBRILLATION ELECTRODES

## (undated) DEVICE — SET PRIMARY GRVTY 10DP MALE LL 104IN

## (undated) DEVICE — KT ELECTRD EP SURF ENSITE/X ADHS/PTCH 1P/U NS

## (undated) DEVICE — CATH ADVISOR HD GRID MAP SENSR ENABLED

## (undated) DEVICE — STERILE (15.2 TAPERED TO 7.6 X 183CM) POLYETHYLENE ACCORDION-FOLDED COVER FOR USE WITH SIEMENS ACUNAV ULTRASOUND CATHETER FAMILY CONNECTOR: Brand: SWIFTLINK TRANSDUCER COVER

## (undated) DEVICE — DECANT BG O JET

## (undated) DEVICE — PAD GRND REM POLYHESIVE A/ DISP

## (undated) DEVICE — LEX ELECTRO PHYSIOLOGY: Brand: MEDLINE INDUSTRIES, INC.

## (undated) DEVICE — Device: Brand: SMARTABLATE

## (undated) DEVICE — KT MANIFLD EP

## (undated) DEVICE — Device: Brand: MEDEX

## (undated) DEVICE — LOCATION REFERENCE PATCH KIT: Brand: RHYTHMIA™ MAPPING SYSTEM

## (undated) DEVICE — CATHETER CONNECTION CABLE: Brand: FARASTAR™

## (undated) DEVICE — ST EXT IV SMRTSTE 2VLV FIX M LL 6ML 41

## (undated) DEVICE — PINNACLE INTRODUCER SHEATH: Brand: PINNACLE

## (undated) DEVICE — Device: Brand: VIZIGO

## (undated) DEVICE — Device: Brand: REFERENCE PATCH CARTO 3

## (undated) DEVICE — ST EXT IV LG BORE NDLESS FLTR LL 17IN

## (undated) DEVICE — INTRO SHEATH ENGAGE W/50 GW .038 9F12

## (undated) DEVICE — ACQGUIDE MINI-S, 65CM - L2 WITH ACQCROSS QX: Brand: ACQGUIDE MINI-S

## (undated) DEVICE — OCTA,GALAXY,3-3-3-3-3,D-CURVE: Brand: OCTARAY MAPPING CATHETER

## (undated) DEVICE — CANN NASL CAPNOFLEX SMPL CO2/O2 W/O2/DEL A/

## (undated) DEVICE — Device: Brand: SOUNDSTAR

## (undated) DEVICE — GW DIAG .032

## (undated) DEVICE — PERCLOSE™ PROSTYLE™ SUTURE-MEDIATED CLOSURE AND REPAIR SYSTEM: Brand: PERCLOSE™ PROSTYLE™

## (undated) DEVICE — STEERABLE SHEATH CLEAR: Brand: FARADRIVE™

## (undated) DEVICE — TBG PRESS MON 48IN M/F L/L: Brand: MEDLINE INDUSTRIES, INC.

## (undated) DEVICE — CONTRL CONTRST CHMBRD W/TBG72IN REUS

## (undated) DEVICE — ACCESS SOLUTION: Brand: VERSACROSS™ ACCESS SOLUTION

## (undated) DEVICE — DRSNG SURESITE123 4X4.8IN

## (undated) DEVICE — Device: Brand: WEBSTER

## (undated) DEVICE — ST EXT IV SMARTSITE PINCH/CLMP 5ML 46CM

## (undated) DEVICE — 1 X VERSACROSS CONNECT TRANSSEPTAL DILATOR;  1 X VERSACROSS RF WIRE (INCLUDING 1 X CONNECTOR CABLE (SINGLE USE)): Brand: VERSACROSS CONNECT ACCESS SOLUTION FOR FARADRIVE

## (undated) DEVICE — CATH DIAG IMPULSE PIG145 6F 110CM

## (undated) DEVICE — SYS CLS VASC/VENI VASCADE MVP 6TO12F

## (undated) DEVICE — TBG EXT STD/BORE LL 48IN

## (undated) DEVICE — DOME MONITORING W BONDED STPCK BIOTRANS2

## (undated) DEVICE — CATH ULTRASND ECHO ACUNAV FOR GE VIVID1 8F 90CM

## (undated) DEVICE — PRESSURE MONITORING SET: Brand: TRUWAVE

## (undated) DEVICE — INTRO SHEATH FAST/CATH LG/LUM 11F .038IN 12CM

## (undated) DEVICE — INTRO SHEATH ENGAGE W/50 GW .038 8F12

## (undated) DEVICE — BI-DIRECTIONAL NAVIGATION CATHETER, NAV, F-J: Brand: QDOT MICRO

## (undated) DEVICE — CATH ULTRASND ECHO ACUNAV FOR ACUSON 8F 90CM

## (undated) DEVICE — ELECTRD RETRN/GRND MEGADYNE SGL/PLT W/CORD 9FT DISP

## (undated) DEVICE — TBG SXN ESOPH ENSOETM FOR/BLANETROL/1/2

## (undated) DEVICE — INTRO SHEATH ENGAGE W/50 GW .038 7F12